# Patient Record
Sex: MALE | Race: OTHER | HISPANIC OR LATINO | URBAN - METROPOLITAN AREA
[De-identification: names, ages, dates, MRNs, and addresses within clinical notes are randomized per-mention and may not be internally consistent; named-entity substitution may affect disease eponyms.]

---

## 2019-11-18 ENCOUNTER — EMERGENCY (EMERGENCY)
Facility: HOSPITAL | Age: 49
LOS: 1 days | Discharge: HOME | End: 2019-11-18
Attending: STUDENT IN AN ORGANIZED HEALTH CARE EDUCATION/TRAINING PROGRAM
Payer: MEDICAID

## 2019-11-18 ENCOUNTER — TRANSCRIPTION ENCOUNTER (OUTPATIENT)
Age: 49
End: 2019-11-18

## 2019-11-18 VITALS
TEMPERATURE: 98 F | SYSTOLIC BLOOD PRESSURE: 178 MMHG | HEART RATE: 99 BPM | RESPIRATION RATE: 20 BRPM | WEIGHT: 150.8 LBS | OXYGEN SATURATION: 99 % | DIASTOLIC BLOOD PRESSURE: 127 MMHG | HEIGHT: 69 IN

## 2019-11-18 VITALS — HEART RATE: 68 BPM | SYSTOLIC BLOOD PRESSURE: 161 MMHG | DIASTOLIC BLOOD PRESSURE: 100 MMHG

## 2019-11-18 LAB
ALBUMIN SERPL ELPH-MCNC: 4 G/DL — SIGNIFICANT CHANGE UP (ref 3.5–5)
ALP SERPL-CCNC: 99 U/L — SIGNIFICANT CHANGE UP (ref 40–120)
ALT FLD-CCNC: 25 U/L DA — SIGNIFICANT CHANGE UP (ref 10–60)
ANION GAP SERPL CALC-SCNC: 5 MMOL/L — SIGNIFICANT CHANGE UP (ref 5–17)
APPEARANCE UR: CLEAR — SIGNIFICANT CHANGE UP
AST SERPL-CCNC: 17 U/L — SIGNIFICANT CHANGE UP (ref 10–40)
BASOPHILS # BLD AUTO: 0.07 K/UL — SIGNIFICANT CHANGE UP (ref 0–0.2)
BASOPHILS NFR BLD AUTO: 0.6 % — SIGNIFICANT CHANGE UP (ref 0–2)
BILIRUB SERPL-MCNC: 0.7 MG/DL — SIGNIFICANT CHANGE UP (ref 0.2–1.2)
BILIRUB UR-MCNC: NEGATIVE — SIGNIFICANT CHANGE UP
BUN SERPL-MCNC: 20 MG/DL — HIGH (ref 7–18)
CALCIUM SERPL-MCNC: 9.4 MG/DL — SIGNIFICANT CHANGE UP (ref 8.4–10.5)
CHLORIDE SERPL-SCNC: 104 MMOL/L — SIGNIFICANT CHANGE UP (ref 96–108)
CO2 SERPL-SCNC: 29 MMOL/L — SIGNIFICANT CHANGE UP (ref 22–31)
COLOR SPEC: YELLOW — SIGNIFICANT CHANGE UP
CREAT SERPL-MCNC: 1.43 MG/DL — HIGH (ref 0.5–1.3)
DIFF PNL FLD: NEGATIVE — SIGNIFICANT CHANGE UP
EOSINOPHIL # BLD AUTO: 0.63 K/UL — HIGH (ref 0–0.5)
EOSINOPHIL NFR BLD AUTO: 5.8 % — SIGNIFICANT CHANGE UP (ref 0–6)
GLUCOSE SERPL-MCNC: 89 MG/DL — SIGNIFICANT CHANGE UP (ref 70–99)
GLUCOSE UR QL: NEGATIVE — SIGNIFICANT CHANGE UP
HCT VFR BLD CALC: 43.5 % — SIGNIFICANT CHANGE UP (ref 39–50)
HGB BLD-MCNC: 14.8 G/DL — SIGNIFICANT CHANGE UP (ref 13–17)
IMM GRANULOCYTES NFR BLD AUTO: 0.6 % — SIGNIFICANT CHANGE UP (ref 0–1.5)
KETONES UR-MCNC: NEGATIVE — SIGNIFICANT CHANGE UP
LEUKOCYTE ESTERASE UR-ACNC: ABNORMAL
LYMPHOCYTES # BLD AUTO: 2.65 K/UL — SIGNIFICANT CHANGE UP (ref 1–3.3)
LYMPHOCYTES # BLD AUTO: 24.2 % — SIGNIFICANT CHANGE UP (ref 13–44)
MCHC RBC-ENTMCNC: 29.5 PG — SIGNIFICANT CHANGE UP (ref 27–34)
MCHC RBC-ENTMCNC: 34 GM/DL — SIGNIFICANT CHANGE UP (ref 32–36)
MCV RBC AUTO: 86.8 FL — SIGNIFICANT CHANGE UP (ref 80–100)
MONOCYTES # BLD AUTO: 0.86 K/UL — SIGNIFICANT CHANGE UP (ref 0–0.9)
MONOCYTES NFR BLD AUTO: 7.9 % — SIGNIFICANT CHANGE UP (ref 2–14)
NEUTROPHILS # BLD AUTO: 6.66 K/UL — SIGNIFICANT CHANGE UP (ref 1.8–7.4)
NEUTROPHILS NFR BLD AUTO: 60.9 % — SIGNIFICANT CHANGE UP (ref 43–77)
NITRITE UR-MCNC: NEGATIVE — SIGNIFICANT CHANGE UP
NRBC # BLD: 0 /100 WBCS — SIGNIFICANT CHANGE UP (ref 0–0)
PH UR: 6 — SIGNIFICANT CHANGE UP (ref 5–8)
PLATELET # BLD AUTO: 298 K/UL — SIGNIFICANT CHANGE UP (ref 150–400)
POTASSIUM SERPL-MCNC: 3.7 MMOL/L — SIGNIFICANT CHANGE UP (ref 3.5–5.3)
POTASSIUM SERPL-SCNC: 3.7 MMOL/L — SIGNIFICANT CHANGE UP (ref 3.5–5.3)
PROT SERPL-MCNC: 8.6 G/DL — HIGH (ref 6–8.3)
PROT UR-MCNC: NEGATIVE — SIGNIFICANT CHANGE UP
RBC # BLD: 5.01 M/UL — SIGNIFICANT CHANGE UP (ref 4.2–5.8)
RBC # FLD: 13.2 % — SIGNIFICANT CHANGE UP (ref 10.3–14.5)
SODIUM SERPL-SCNC: 138 MMOL/L — SIGNIFICANT CHANGE UP (ref 135–145)
SP GR SPEC: 1.01 — SIGNIFICANT CHANGE UP (ref 1.01–1.02)
UROBILINOGEN FLD QL: NEGATIVE — SIGNIFICANT CHANGE UP
WBC # BLD: 10.94 K/UL — HIGH (ref 3.8–10.5)
WBC # FLD AUTO: 10.94 K/UL — HIGH (ref 3.8–10.5)

## 2019-11-18 PROCEDURE — 96374 THER/PROPH/DIAG INJ IV PUSH: CPT | Mod: XU

## 2019-11-18 PROCEDURE — 99284 EMERGENCY DEPT VISIT MOD MDM: CPT | Mod: 25

## 2019-11-18 PROCEDURE — 87086 URINE CULTURE/COLONY COUNT: CPT

## 2019-11-18 PROCEDURE — 81001 URINALYSIS AUTO W/SCOPE: CPT

## 2019-11-18 PROCEDURE — 74177 CT ABD & PELVIS W/CONTRAST: CPT

## 2019-11-18 PROCEDURE — 80053 COMPREHEN METABOLIC PANEL: CPT

## 2019-11-18 PROCEDURE — 99284 EMERGENCY DEPT VISIT MOD MDM: CPT

## 2019-11-18 PROCEDURE — 74177 CT ABD & PELVIS W/CONTRAST: CPT | Mod: 26

## 2019-11-18 PROCEDURE — 85027 COMPLETE CBC AUTOMATED: CPT

## 2019-11-18 PROCEDURE — 36415 COLL VENOUS BLD VENIPUNCTURE: CPT

## 2019-11-18 RX ORDER — ACETAMINOPHEN 500 MG
1000 TABLET ORAL ONCE
Refills: 0 | Status: COMPLETED | OUTPATIENT
Start: 2019-11-18 | End: 2019-11-18

## 2019-11-18 RX ADMIN — Medication 400 MILLIGRAM(S): at 16:25

## 2019-11-18 NOTE — ED ADULT NURSE NOTE - ED STAT RN HANDOFF DETAILS
dutta cath inserted 1250 c/c of agustín color urine - MD made aware , dutta cath changed to the leg bag - pt given instruction to empty the leg bag -pt demonstrates understanding back - instructed to follow up with urology

## 2019-11-18 NOTE — ED PROVIDER NOTE - OBJECTIVE STATEMENT
48 y.o presenting with lower abd pain, dysuria x 2 days. denies n, v, fever, cough, cp, sob, diarrhea. endorses + hematuria.

## 2019-11-18 NOTE — ED ADULT NURSE NOTE - OBJECTIVE STATEMENT
pt is here c/o frequency and difficulty urinating for the last 2 days , abdomen is tender on palpation

## 2019-11-18 NOTE — ED PROVIDER NOTE - PATIENT PORTAL LINK FT
You can access the FollowMyHealth Patient Portal offered by  by registering at the following website: [email or web address]. By joining [employer]’s FollowMyHealth portal, you will also be able to view your health information using other applications (apps) compatible with our system.

## 2019-11-18 NOTE — ED PROVIDER NOTE - PROGRESS NOTE DETAILS
patient ct show retention, dutta placed by nursing, 1200cc return, patient ambulatory, likely 2/2 bph, given instruction to f.u urology. return precaution instructed

## 2019-11-19 ENCOUNTER — APPOINTMENT (OUTPATIENT)
Dept: UROLOGY | Facility: CLINIC | Age: 49
End: 2019-11-19
Payer: MEDICAID

## 2019-11-19 VITALS
OXYGEN SATURATION: 98 % | DIASTOLIC BLOOD PRESSURE: 93 MMHG | HEIGHT: 68 IN | HEART RATE: 102 BPM | BODY MASS INDEX: 23.49 KG/M2 | RESPIRATION RATE: 16 BRPM | WEIGHT: 155 LBS | SYSTOLIC BLOOD PRESSURE: 135 MMHG

## 2019-11-19 DIAGNOSIS — Z80.1 FAMILY HISTORY OF MALIGNANT NEOPLASM OF TRACHEA, BRONCHUS AND LUNG: ICD-10-CM

## 2019-11-19 DIAGNOSIS — Z78.9 OTHER SPECIFIED HEALTH STATUS: ICD-10-CM

## 2019-11-19 DIAGNOSIS — N39.41 URGE INCONTINENCE: ICD-10-CM

## 2019-11-19 PROBLEM — Z00.00 ENCOUNTER FOR PREVENTIVE HEALTH EXAMINATION: Status: ACTIVE | Noted: 2019-11-19

## 2019-11-19 LAB
CULTURE RESULTS: SIGNIFICANT CHANGE UP
SPECIMEN SOURCE: SIGNIFICANT CHANGE UP

## 2019-11-19 PROCEDURE — 99204 OFFICE O/P NEW MOD 45 MIN: CPT

## 2019-11-19 NOTE — ASSESSMENT
[FreeTextEntry1] : Very pleasant 48-year-old gentleman who presents for evaluation of BPH with urinary obstruction, urinary retention, bilateral hydronephrosis\par -Records from hospital reviewed\par -CT images from hospital reviewed with the patient\par -Start Flomax b.i.d.\par -Discussed the natural history of BPH, as well as typical symptoms of an enlarged prostate. Discussed potential complications that could arise from BPH, including but not limited to urinary tract infections, bladder stones, and renal impairment.\par -I discussed the risks, benefits, alternatives, and possible side effects of alpha blocker therapy with the patient, including but not limited to dizziness, lightheadedness, headache, blurred vision, retrograde ejaculation, and priapism with the patient. I also discussed that the patient must inform his ophthalmologist that he has taken an alpha blocker prior to undergoing cataract or glaucoma surgery.\par -Follow up in 3 days for trial of void

## 2019-11-19 NOTE — PHYSICAL EXAM
[General Appearance - Well Developed] : well developed [General Appearance - Well Nourished] : well nourished [Normal Appearance] : normal appearance [Well Groomed] : well groomed [General Appearance - In No Acute Distress] : no acute distress [Edema] : no peripheral edema [Respiration, Rhythm And Depth] : normal respiratory rhythm and effort [Exaggerated Use Of Accessory Muscles For Inspiration] : no accessory muscle use [Abdomen Soft] : soft [Abdomen Tenderness] : non-tender [Costovertebral Angle Tenderness] : no ~M costovertebral angle tenderness [Urethral Meatus] : meatus normal [Urinary Bladder Findings] : the bladder was normal on palpation [Scrotum] : the scrotum was normal [Testes Mass (___cm)] : there were no testicular masses [FreeTextEntry1] : Alexander with clear yellow urine [Normal Station and Gait] : the gait and station were normal for the patient's age [] : no rash [No Focal Deficits] : no focal deficits [Affect] : the affect was normal [Oriented To Time, Place, And Person] : oriented to person, place, and time [Mood] : the mood was normal [Not Anxious] : not anxious [No Palpable Adenopathy] : no palpable adenopathy

## 2019-11-19 NOTE — REVIEW OF SYSTEMS
[Palpitations] : palpitations [Abdominal Pain] : abdominal pain [Strain or push to urinate] : strain or push to urinate [Wait a long time to urinate] : waits a long time to urinate [Slow urine stream] : slow urine stream [Interrupted urine stream] : interrupted urine stream [Negative] : Heme/Lymph [FreeTextEntry3] : frequent uriation 10x in the daytime as per pt.

## 2019-11-19 NOTE — HISTORY OF PRESENT ILLNESS
[FreeTextEntry1] : Very pleasant 48-year-old gentleman who presents for evaluation of BPH with urinary obstruction, urinary retention, bilateral hydronephrosis. Patient went to the emergency department yesterday complaining of an inability to urinate. He reports that the symptoms started 2 days ago. In the emergency department he was noted to have a distended bladder and a CT scan demonstrated bilateral hydronephrosis with significant bladder distention. A Alexander catheter was placed and he was discharged home. He was not discharged with any medications. He reports a history of a weak urinary stream, more pronounced recently. He denies dysuria. No hematuria. No flank pain or suprapubic pain normally, but he did experience suprapubic pain with this episode of retention. No specific timing to his symptoms. No aggravating factors, but when Alexander catheter was placed his symptoms significantly improved. [Urinary Retention] : urinary retention [Urinary Urgency] : no urinary urgency [Nocturia] : nocturia [Urinary Frequency] : no urinary frequency [Weak Stream] : weak stream [Dysuria] : no dysuria [Hematuria - Gross] : no gross hematuria [Bladder Spasm] : no bladder spasm [Abdominal Pain] : no abdominal pain [Flank Pain] : no flank pain

## 2019-11-22 ENCOUNTER — APPOINTMENT (OUTPATIENT)
Dept: UROLOGY | Facility: CLINIC | Age: 49
End: 2019-11-22
Payer: MEDICAID

## 2019-11-22 VITALS
BODY MASS INDEX: 23.49 KG/M2 | SYSTOLIC BLOOD PRESSURE: 176 MMHG | HEIGHT: 68 IN | DIASTOLIC BLOOD PRESSURE: 120 MMHG | WEIGHT: 155 LBS | HEART RATE: 91 BPM

## 2019-11-22 PROCEDURE — 99213 OFFICE O/P EST LOW 20 MIN: CPT | Mod: 25

## 2019-11-22 PROCEDURE — 51703 INSERT BLADDER CATH COMPLEX: CPT

## 2019-11-22 NOTE — HISTORY OF PRESENT ILLNESS
[FreeTextEntry1] : Very pleasant 49-year-old gentleman presents for followup of BPH with urinary obstruction and urinary retention. Patient recently went to the emergency complaining of inability to urinate. A CT scan was done which demonstrated a very distended bladder. A Alexander catheter was placed. He was started on Flomax on Tuesday and presents today for a trial of void. He has been taking Flomax b.i.d. for the last 2 days. [Urinary Retention] : urinary retention [Urinary Urgency] : no urinary urgency [Urinary Frequency] : no urinary frequency [Nocturia] : nocturia [Weak Stream] : weak stream [Dysuria] : no dysuria [Hematuria - Gross] : no gross hematuria [Bladder Spasm] : no bladder spasm [Abdominal Pain] : no abdominal pain [Flank Pain] : no flank pain

## 2019-11-22 NOTE — PHYSICAL EXAM

## 2019-11-22 NOTE — ASSESSMENT
[FreeTextEntry1] : Very pleasant 49-year-old gentleman who presents for followup of BPH with urinary obstruction and urinary retention\par -Trial of void performed in the office today\par -Patient was able to urinate, however he reports a very slow urinary stream\par -\par -Alexander catheter replaced\par -Cystoscopy in one week and repeat trial of void at that time

## 2019-11-27 ENCOUNTER — APPOINTMENT (OUTPATIENT)
Dept: UROLOGY | Facility: CLINIC | Age: 49
End: 2019-11-27
Payer: MEDICAID

## 2019-11-27 VITALS
RESPIRATION RATE: 17 BRPM | BODY MASS INDEX: 23.49 KG/M2 | SYSTOLIC BLOOD PRESSURE: 180 MMHG | HEIGHT: 68 IN | HEART RATE: 101 BPM | WEIGHT: 155 LBS | DIASTOLIC BLOOD PRESSURE: 106 MMHG

## 2019-11-27 PROCEDURE — 52000 CYSTOURETHROSCOPY: CPT

## 2019-11-27 PROCEDURE — 99214 OFFICE O/P EST MOD 30 MIN: CPT | Mod: 25

## 2019-11-27 PROCEDURE — 51741 ELECTRO-UROFLOWMETRY FIRST: CPT

## 2019-11-27 RX ORDER — FINASTERIDE 5 MG/1
5 TABLET, FILM COATED ORAL DAILY
Qty: 30 | Refills: 3 | Status: ACTIVE | COMMUNITY
Start: 2019-11-27 | End: 1900-01-01

## 2019-11-27 RX ORDER — ALFUZOSIN HYDROCHLORIDE 10 MG/1
10 TABLET, EXTENDED RELEASE ORAL DAILY
Qty: 30 | Refills: 1 | Status: ACTIVE | COMMUNITY
Start: 2019-11-27 | End: 1900-01-01

## 2019-11-27 NOTE — ASSESSMENT
[FreeTextEntry1] : Presents for followup of BPH with urinary obstruction and urinary retention\par -Cystoscopy demonstrates trilobar hypertrophy, but no urothelial lesions\par -Uroflow demonstrates Q. max of 6.2 cc per second\par -We had an extensive discussion of potential options for management moving forward\par -Patient would like to try a different alpha blocker as well as a 5 alpha reductase inhibitor because of the concern for infertility after outlet procedures\par -Followup in one week for trial of void\par -Start alfuzosin and finasteride

## 2019-11-27 NOTE — HISTORY OF PRESENT ILLNESS
[FreeTextEntry1] : Very pleasant 49-year-old gentleman who presents for followup of BPH with urinary obstruction. Patient underwent cystoscopy today which demonstrated an enlarged prostate with trilobar hypertrophy. He was able to urinate afterwards, and reports a stronger stream of urine. On uroflow, he was noted to have a peak flow rate of 6.2 cc per second with a voided volume of 176 cc. He felt distended and uncomfortable. A PVR was found to be only 125 cc, however he was uncomfortable and requested a catheter. Patient is interested in having additional children in the future. [Urinary Retention] : urinary retention [Urinary Urgency] : no urinary urgency [Urinary Frequency] : no urinary frequency [Nocturia] : nocturia [Weak Stream] : weak stream [Dysuria] : no dysuria [Hematuria - Gross] : no gross hematuria [Bladder Spasm] : no bladder spasm [Abdominal Pain] : no abdominal pain [Flank Pain] : no flank pain

## 2019-11-27 NOTE — PHYSICAL EXAM
[General Appearance - Well Developed] : well developed [General Appearance - Well Nourished] : well nourished [Normal Appearance] : normal appearance [Well Groomed] : well groomed [General Appearance - In No Acute Distress] : no acute distress [Abdomen Soft] : soft [Abdomen Tenderness] : non-tender [Costovertebral Angle Tenderness] : no ~M costovertebral angle tenderness [Urethral Meatus] : meatus normal [Urinary Bladder Findings] : the bladder was normal on palpation [Scrotum] : the scrotum was normal [Testes Mass (___cm)] : there were no testicular masses [FreeTextEntry1] : Alexander with clear yellow urine [Edema] : no peripheral edema [] : no respiratory distress [Respiration, Rhythm And Depth] : normal respiratory rhythm and effort [Exaggerated Use Of Accessory Muscles For Inspiration] : no accessory muscle use [Oriented To Time, Place, And Person] : oriented to person, place, and time [Affect] : the affect was normal [Mood] : the mood was normal [Not Anxious] : not anxious [Normal Station and Gait] : the gait and station were normal for the patient's age [No Focal Deficits] : no focal deficits [No Palpable Adenopathy] : no palpable adenopathy

## 2019-12-03 ENCOUNTER — APPOINTMENT (OUTPATIENT)
Dept: UROLOGY | Facility: CLINIC | Age: 49
End: 2019-12-03
Payer: MEDICAID

## 2019-12-03 VITALS
DIASTOLIC BLOOD PRESSURE: 90 MMHG | HEART RATE: 107 BPM | WEIGHT: 155 LBS | BODY MASS INDEX: 23.49 KG/M2 | HEIGHT: 68 IN | SYSTOLIC BLOOD PRESSURE: 142 MMHG

## 2019-12-03 PROCEDURE — 99214 OFFICE O/P EST MOD 30 MIN: CPT

## 2019-12-03 NOTE — PHYSICAL EXAM
[General Appearance - Well Nourished] : well nourished [Normal Appearance] : normal appearance [General Appearance - Well Developed] : well developed [Well Groomed] : well groomed [General Appearance - In No Acute Distress] : no acute distress [Costovertebral Angle Tenderness] : no ~M costovertebral angle tenderness [Abdomen Soft] : soft [Abdomen Tenderness] : non-tender [Urinary Bladder Findings] : the bladder was normal on palpation [Urethral Meatus] : meatus normal [Scrotum] : the scrotum was normal [Testes Mass (___cm)] : there were no testicular masses [FreeTextEntry1] : Alexander with clear yellow urine [Edema] : no peripheral edema [Respiration, Rhythm And Depth] : normal respiratory rhythm and effort [Exaggerated Use Of Accessory Muscles For Inspiration] : no accessory muscle use [] : no respiratory distress [Affect] : the affect was normal [Mood] : the mood was normal [Oriented To Time, Place, And Person] : oriented to person, place, and time [Not Anxious] : not anxious [Normal Station and Gait] : the gait and station were normal for the patient's age [No Palpable Adenopathy] : no palpable adenopathy [No Focal Deficits] : no focal deficits

## 2019-12-03 NOTE — HISTORY OF PRESENT ILLNESS
[Urinary Retention] : urinary retention [FreeTextEntry1] : Very pleasant 49-year-old gentleman who presents for followup of BPH with urinary obstruction and urinary retention. Patient reports that he does not feel that he will be able to urinate today with catheter removal and trial of void. He is interested in an outlet procedure. He does report, however that he is still considering having additional children. He denies that area. No hematuria. No flank pain or suprapubic pain. No other complaints. [Urinary Urgency] : no urinary urgency [Urinary Frequency] : no urinary frequency [Nocturia] : nocturia [Weak Stream] : weak stream [Dysuria] : no dysuria [Hematuria - Gross] : no gross hematuria [Bladder Spasm] : no bladder spasm [Abdominal Pain] : no abdominal pain [Flank Pain] : no flank pain

## 2019-12-23 ENCOUNTER — APPOINTMENT (OUTPATIENT)
Dept: UROLOGY | Facility: CLINIC | Age: 49
End: 2019-12-23

## 2020-01-03 ENCOUNTER — APPOINTMENT (OUTPATIENT)
Dept: UROLOGY | Facility: CLINIC | Age: 50
End: 2020-01-03
Payer: MEDICAID

## 2020-01-03 VITALS
SYSTOLIC BLOOD PRESSURE: 159 MMHG | DIASTOLIC BLOOD PRESSURE: 110 MMHG | WEIGHT: 155 LBS | HEART RATE: 74 BPM | HEIGHT: 68 IN | BODY MASS INDEX: 23.49 KG/M2

## 2020-01-03 PROCEDURE — 99213 OFFICE O/P EST LOW 20 MIN: CPT

## 2020-01-03 NOTE — PHYSICAL EXAM
[General Appearance - Well Nourished] : well nourished [General Appearance - Well Developed] : well developed [General Appearance - In No Acute Distress] : no acute distress [Normal Appearance] : normal appearance [Well Groomed] : well groomed [Costovertebral Angle Tenderness] : no ~M costovertebral angle tenderness [Abdomen Tenderness] : non-tender [Abdomen Soft] : soft [Urinary Bladder Findings] : the bladder was normal on palpation [Edema] : no peripheral edema [] : no respiratory distress [Respiration, Rhythm And Depth] : normal respiratory rhythm and effort [Exaggerated Use Of Accessory Muscles For Inspiration] : no accessory muscle use [Affect] : the affect was normal [Oriented To Time, Place, And Person] : oriented to person, place, and time [Not Anxious] : not anxious [Mood] : the mood was normal [Normal Station and Gait] : the gait and station were normal for the patient's age [No Focal Deficits] : no focal deficits [No Palpable Adenopathy] : no palpable adenopathy

## 2020-01-03 NOTE — ASSESSMENT
[FreeTextEntry1] : Very pleasant 49-year-old gentleman who presents for followup of BPH with urinary obstruction and urinary retention\par -We discussed the risks and benefits of a trial of void in the afternoon on a Friday. We discussed that if he would be unable to urinate, he would need to go to the emergency department for a Alexander catheter\par -Patient would like to postpone trial of void until next week\par -We discussed the likelihood of spontaneous voiding, given inability to urinate on previous occasions\par -We discussed risks and benefits of an outlet procedure\par -Follow up Tuesday morning for repeat trial of void

## 2020-01-03 NOTE — HISTORY OF PRESENT ILLNESS
[FreeTextEntry1] : Very pleasant 49-year-old gentleman who presents for followup of BPH with urinary obstruction and urinary retention. Patient was previously found to be in urinary retention with bilateral hydronephrosis. A Alexander catheter was placed and was most recently changed on November 27, 2019. He was scheduled to undergo urodynamics in anticipation of an outlet procedure on December 23, however he canceled his appointment. He presents today to discuss further management options and requests that the catheter be removed. He denies dysuria. No hematuria. He reports mild irritation at the catheter insertion site. [Urinary Retention] : urinary retention [Urinary Urgency] : no urinary urgency [Urinary Frequency] : no urinary frequency [Nocturia] : nocturia [Weak Stream] : weak stream [Dysuria] : no dysuria [Hematuria - Gross] : no gross hematuria [Bladder Spasm] : no bladder spasm [Flank Pain] : no flank pain [Abdominal Pain] : no abdominal pain

## 2020-01-07 ENCOUNTER — APPOINTMENT (OUTPATIENT)
Dept: UROLOGY | Facility: CLINIC | Age: 50
End: 2020-01-07
Payer: MEDICAID

## 2020-01-07 VITALS
HEIGHT: 68 IN | BODY MASS INDEX: 23.49 KG/M2 | WEIGHT: 155 LBS | SYSTOLIC BLOOD PRESSURE: 198 MMHG | HEART RATE: 87 BPM | DIASTOLIC BLOOD PRESSURE: 120 MMHG

## 2020-01-07 PROCEDURE — 99213 OFFICE O/P EST LOW 20 MIN: CPT | Mod: 25

## 2020-01-07 PROCEDURE — 51700 IRRIGATION OF BLADDER: CPT

## 2020-01-07 NOTE — ASSESSMENT
[FreeTextEntry1] : A very pleasant 49-year-old gentleman who presents for followup of BPH with urinary obstruction and urinary retention\par -Patient urinated well after Alexander catheter was removed\par -Week and discussed the risks of urinary retention, and patient understands that he must call or go to the emergency department immediately he would be unable to urinate\par -Followup in 3-4 days for uroflow and PVR\par

## 2020-01-07 NOTE — PHYSICAL EXAM
[General Appearance - Well Developed] : well developed [General Appearance - Well Nourished] : well nourished [Well Groomed] : well groomed [Normal Appearance] : normal appearance [General Appearance - In No Acute Distress] : no acute distress [Abdomen Tenderness] : non-tender [Abdomen Soft] : soft [Costovertebral Angle Tenderness] : no ~M costovertebral angle tenderness [FreeTextEntry1] : Alexander with clear yellow [Urinary Bladder Findings] : the bladder was normal on palpation [Edema] : no peripheral edema [] : no respiratory distress [Exaggerated Use Of Accessory Muscles For Inspiration] : no accessory muscle use [Respiration, Rhythm And Depth] : normal respiratory rhythm and effort [Mood] : the mood was normal [Oriented To Time, Place, And Person] : oriented to person, place, and time [Affect] : the affect was normal [Normal Station and Gait] : the gait and station were normal for the patient's age [Not Anxious] : not anxious [No Focal Deficits] : no focal deficits [No Palpable Adenopathy] : no palpable adenopathy

## 2020-01-07 NOTE — HISTORY OF PRESENT ILLNESS
[FreeTextEntry1] : Very pleasant 49-year-old gentleman presents for followup of BPH with urinary obstruction and urinary retention. Patient feels well. He continues to take alfuzosin. He reports no problems with Alexander catheter. He is here today for a trial of void. [Urinary Retention] : urinary retention [Urinary Urgency] : no urinary urgency [Urinary Frequency] : no urinary frequency [Nocturia] : nocturia [Weak Stream] : weak stream [Dysuria] : no dysuria [Hematuria - Gross] : no gross hematuria [Bladder Spasm] : no bladder spasm [Abdominal Pain] : no abdominal pain [Flank Pain] : no flank pain

## 2020-01-10 ENCOUNTER — APPOINTMENT (OUTPATIENT)
Dept: UROLOGY | Facility: CLINIC | Age: 50
End: 2020-01-10
Payer: MEDICAID

## 2020-01-10 DIAGNOSIS — N13.30 UNSPECIFIED HYDRONEPHROSIS: ICD-10-CM

## 2020-01-10 DIAGNOSIS — N13.8 BENIGN PROSTATIC HYPERPLASIA WITH LOWER URINARY TRACT SYMPMS: ICD-10-CM

## 2020-01-10 DIAGNOSIS — N40.1 BENIGN PROSTATIC HYPERPLASIA WITH LOWER URINARY TRACT SYMPMS: ICD-10-CM

## 2020-01-10 DIAGNOSIS — R33.9 RETENTION OF URINE, UNSPECIFIED: ICD-10-CM

## 2020-01-10 PROCEDURE — 99213 OFFICE O/P EST LOW 20 MIN: CPT

## 2020-01-10 RX ORDER — TAMSULOSIN HYDROCHLORIDE 0.4 MG/1
0.4 CAPSULE ORAL DAILY
Qty: 120 | Refills: 0 | Status: DISCONTINUED | COMMUNITY
Start: 2019-11-19 | End: 2020-01-10

## 2020-01-10 NOTE — PHYSICAL EXAM
[General Appearance - Well Developed] : well developed [Normal Appearance] : normal appearance [General Appearance - Well Nourished] : well nourished [Well Groomed] : well groomed [General Appearance - In No Acute Distress] : no acute distress [Abdomen Soft] : soft [Costovertebral Angle Tenderness] : no ~M costovertebral angle tenderness [Abdomen Tenderness] : non-tender [Urinary Bladder Findings] : the bladder was normal on palpation [Edema] : no peripheral edema [] : no respiratory distress [Respiration, Rhythm And Depth] : normal respiratory rhythm and effort [Oriented To Time, Place, And Person] : oriented to person, place, and time [Exaggerated Use Of Accessory Muscles For Inspiration] : no accessory muscle use [Mood] : the mood was normal [Not Anxious] : not anxious [Affect] : the affect was normal [Normal Station and Gait] : the gait and station were normal for the patient's age [No Focal Deficits] : no focal deficits [No Palpable Adenopathy] : no palpable adenopathy

## 2020-01-10 NOTE — HISTORY OF PRESENT ILLNESS
[Urinary Retention] : no urinary retention [Urinary Urgency] : no urinary urgency [Urinary Frequency] : no urinary frequency [Weak Stream] : no weak stream [Dysuria] : no dysuria [Hematuria - Gross] : no gross hematuria [Bladder Spasm] : no bladder spasm [Abdominal Pain] : no abdominal pain [Flank Pain] : no flank pain

## 2020-01-10 NOTE — ASSESSMENT
[FreeTextEntry1] : Very pleasant 49-year-old gentleman who presents for followup of BPH with urinary obstruction\par -PVR 51\par -Continue alfuzosin\par -Continue finasteride\par -We discussed that he is still at significant risk of urinary retention and will likely need an outlet procedure in the future\par -Patient would like to wait for an outlet procedure until after he is finished having children next year\par -Risks of urinary retention and BPH were discussed at length again with the patient\par -Follow up in 3 months

## 2020-04-03 ENCOUNTER — APPOINTMENT (OUTPATIENT)
Dept: UROLOGY | Facility: CLINIC | Age: 50
End: 2020-04-03

## 2021-03-29 ENCOUNTER — OFFICE VISIT (OUTPATIENT)
Dept: FAMILY MEDICINE CLINIC | Facility: CLINIC | Age: 51
End: 2021-03-29

## 2021-03-29 VITALS
OXYGEN SATURATION: 97 % | SYSTOLIC BLOOD PRESSURE: 166 MMHG | HEART RATE: 83 BPM | DIASTOLIC BLOOD PRESSURE: 106 MMHG | TEMPERATURE: 98.3 F | WEIGHT: 153.4 LBS | BODY MASS INDEX: 23.25 KG/M2 | RESPIRATION RATE: 20 BRPM | HEIGHT: 68 IN

## 2021-03-29 DIAGNOSIS — N40.1 BENIGN PROSTATIC HYPERPLASIA WITH LOWER URINARY TRACT SYMPTOMS, SYMPTOM DETAILS UNSPECIFIED: Primary | ICD-10-CM

## 2021-03-29 DIAGNOSIS — M54.41 CHRONIC RIGHT-SIDED LOW BACK PAIN WITH RIGHT-SIDED SCIATICA: ICD-10-CM

## 2021-03-29 DIAGNOSIS — G89.29 CHRONIC RIGHT-SIDED LOW BACK PAIN WITH RIGHT-SIDED SCIATICA: ICD-10-CM

## 2021-03-29 DIAGNOSIS — Z12.11 COLON CANCER SCREENING: ICD-10-CM

## 2021-03-29 DIAGNOSIS — I10 UNCONTROLLED HYPERTENSION: ICD-10-CM

## 2021-03-29 DIAGNOSIS — Z11.4 SCREENING FOR HIV (HUMAN IMMUNODEFICIENCY VIRUS): ICD-10-CM

## 2021-03-29 DIAGNOSIS — Z12.5 PROSTATE CANCER SCREENING: ICD-10-CM

## 2021-03-29 LAB
SL AMB  POCT GLUCOSE, UA: NORMAL
SL AMB LEUKOCYTE ESTERASE,UA: NORMAL
SL AMB POCT BILIRUBIN,UA: NEGATIVE
SL AMB POCT BLOOD,UA: NORMAL
SL AMB POCT CLARITY,UA: NORMAL
SL AMB POCT COLOR,UA: YELLOW
SL AMB POCT KETONES,UA: NEGATIVE
SL AMB POCT NITRITE,UA: NEGATIVE
SL AMB POCT PH,UA: 6
SL AMB POCT SPECIFIC GRAVITY,UA: 1.02
SL AMB POCT URINE PROTEIN: NORMAL
SL AMB POCT UROBILINOGEN: NORMAL

## 2021-03-29 PROCEDURE — 99204 OFFICE O/P NEW MOD 45 MIN: CPT | Performed by: NURSE PRACTITIONER

## 2021-03-29 PROCEDURE — 81002 URINALYSIS NONAUTO W/O SCOPE: CPT | Performed by: NURSE PRACTITIONER

## 2021-03-29 RX ORDER — TAMSULOSIN HYDROCHLORIDE 0.4 MG/1
0.4 CAPSULE ORAL
Qty: 30 CAPSULE | Refills: 1 | Status: SHIPPED | OUTPATIENT
Start: 2021-03-29 | End: 2021-06-07 | Stop reason: SDUPTHER

## 2021-03-29 RX ORDER — LISINOPRIL 20 MG/1
20 TABLET ORAL DAILY
Qty: 30 TABLET | Refills: 0 | Status: SHIPPED | OUTPATIENT
Start: 2021-03-29 | End: 2021-04-05

## 2021-03-29 RX ORDER — AMOXICILLIN 500 MG/1
500 CAPSULE ORAL EVERY 6 HOURS
COMMUNITY
Start: 2021-02-12 | End: 2021-08-03

## 2021-03-29 RX ORDER — METHOCARBAMOL 500 MG/1
500 TABLET, FILM COATED ORAL 3 TIMES DAILY
Qty: 40 TABLET | Refills: 0 | Status: SHIPPED | OUTPATIENT
Start: 2021-03-29 | End: 2021-05-10

## 2021-03-29 RX ORDER — IBUPROFEN 800 MG/1
800 TABLET ORAL EVERY 6 HOURS
COMMUNITY
Start: 2021-02-12 | End: 2021-03-29

## 2021-03-29 NOTE — ASSESSMENT & PLAN NOTE
Secondary to work injury 3 years ago  Recently exacerbated over the past few months  Will check right lumbar x-ray and start topical diclofenac to affected area and methocarbamol needed 3 times daily  Did discuss with patient the possibility grogginess with the methocarbamol and advised against driving or using machinery after taking this medication

## 2021-03-29 NOTE — ASSESSMENT & PLAN NOTE
Patient with urinary frequency and incomplete bladder emptying   Was following with Urology in [place] and on a medication  Urine dip in the office negative for evidence of infection  Will restart the patient on tamsulosin 0 4 mg HS and refer to Urology

## 2021-03-29 NOTE — PROGRESS NOTES
Assessment/Plan:    Uncontrolled hypertension  Blood pressure is not at goal today in the office at 166/106  Patient does report that he was told in [place] that he had hypertension and was on medication but cannot recall which  At this time will start the patient on lisinopril 20 mg daily  He will return to the clinic in several days for blood pressure recheck    Chronic right-sided low back pain with right-sided sciatica  Secondary to work injury 3 years ago  Recently exacerbated over the past few months  Will check right lumbar x-ray and start topical diclofenac to affected area and methocarbamol needed 3 times daily  Did discuss with patient the possibility grogginess with the methocarbamol and advised against driving or using machinery after taking this medication      Benign prostatic hyperplasia with lower urinary tract symptoms  Patient with urinary frequency and incomplete bladder emptying   Was following with Urology in [place] and on a medication  Urine dip in the office negative for evidence of infection  Will restart the patient on tamsulosin 0 4 mg HS and refer to Urology      [facility] was seen today for establish care, back pain, hypertension and urinary incontinence  Diagnoses and all orders for this visit:    Benign prostatic hyperplasia with lower urinary tract symptoms, symptom details unspecified  -     POCT urine dip  -     tamsulosin (FLOMAX) 0 4 mg; Take 1 capsule (0 4 mg total) by mouth daily with dinner  -     Ambulatory referral to Urology; Future    Prostate cancer screening  -     PSA, total and free; Future    Uncontrolled hypertension  -     CBC and differential; Future  -     Comprehensive metabolic panel; Future  -     Hemoglobin A1C; Future  -     Lipid panel; Future  -     TSH, 3rd generation with Free T4 reflex; Future  -     lisinopril (ZESTRIL) 20 mg tablet;  Take 1 tablet (20 mg total) by mouth daily    Screening for HIV (human immunodeficiency virus)  -     HIV 1/2 Antigen/Antibody (4th Generation) w Reflex [facility]; Future    Colon cancer screening  -     Ambulatory referral to Gastroenterology; Future    Chronic right-sided low back pain with right-sided sciatica  -     XR spine lumbar minimum 4 views non injury; Future  -     Diclofenac Sodium (VOLTAREN) 1 %; Apply 2 g topically 4 (four) times a day  -     methocarbamol (ROBAXIN) 500 mg tablet; Take 1 tablet (500 mg total) by mouth 3 (three) times a day      Return for return on Friday for BP check with nurse and 6 weeks with me FU htn, back pain   Patient Instructions     Heart Healthy Diet   WHAT YOU NEED TO KNOW:   A heart healthy diet is an eating plan low in unhealthy fats and sodium (salt)  The plan is high in healthy fats and fiber  A heart healthy diet helps improve your cholesterol levels and lowers your risk for heart disease and stroke  A dietitian will teach you how to read and understand food labels  DISCHARGE INSTRUCTIONS:   Heart healthy diet guidelines to follow:   · Choose foods that contain healthy fats  ? Unsaturated fats  include monounsaturated and polyunsaturated fats  Unsaturated fat is found in foods such as soybean, canola, olive, corn, and safflower oils  It is also found in soft tub margarine that is made with liquid vegetable oil  ? Omega-3 fat  is found in certain fish, such as salmon, tuna, and trout, and in walnuts and flaxseed  Eat fish high in omega-3 fats at least 2 times a week  · Get 20 to 30 grams of fiber each day  Fruits, vegetables, whole-grain foods, and legumes (cooked beans) are good sources of fiber  · Limit or do not have unhealthy fats  ? Cholesterol  is found in animal foods, such as eggs and lobster, and in dairy products made from whole milk  Limit cholesterol to less than 200 mg each day  ? Saturated fat  is found in meats, such as ramires and hamburger  It is also found in chicken or turkey skin, whole milk, and butter   Limit saturated fat to less than 7% of your total daily calories  ? Trans fat  is found in packaged foods, such as potato chips and cookies  It is also in hard margarine, some fried foods, and shortening  Do not eat foods that contain trans fats  · Limit sodium as directed  You may be told to limit sodium to 2,000 to 2,300 mg each day  Choose low-sodium or no-salt-added foods  Add little or no salt to food you prepare  Use herbs and spices in place of salt  Include the following in your heart healthy plan:  Ask your dietitian or healthcare provider how many servings to have from each of the following food groups:  · Grains:      ? Whole-wheat breads, cereals, and pastas, and brown rice    ? Low-fat, low-sodium crackers and chips    · Vegetables:      ? Broccoli, green beans, green peas, and spinach    ? Collards, kale, and lima beans    ? Carrots, sweet potatoes, tomatoes, and peppers    ? Canned vegetables with no salt added    · Fruits:      ? Bananas, peaches, pears, and pineapple    ? Grapes, raisins, and dates    ? Oranges, tangerines, grapefruit, orange juice, and grapefruit juice    ? Apricots, mangoes, melons, and papaya    ? Raspberries and strawberries    ? Canned fruit with no added sugar    · Low-fat dairy:      ? Nonfat (skim) milk, 1% milk, and low-fat almond, cashew, or soy milks fortified with calcium    ? Low-fat cheese, regular or frozen yogurt, and cottage cheese    · Meats and proteins:      ? Lean cuts of beef and pork (loin, leg, round), skinless chicken and turkey    ? Legumes, soy products, egg whites, or nuts    Limit or do not include the following in your heart healthy plan:   · Unhealthy fats and oils:      ? Whole or 2% milk, cream cheese, sour cream, or cheese    ? High-fat cuts of beef (T-bone steaks, ribs), chicken or turkey with skin, and organ meats such as liver    ?  Butter, stick margarine, shortening, and cooking oils such as coconut or palm oil    · Foods and liquids high in sodium: ? Packaged foods, such as frozen dinners, cookies, macaroni and cheese, and cereals with more than 300 mg of sodium per serving    ? Vegetables with added sodium, such as instant potatoes, vegetables with added sauces, or regular canned vegetables    ? Cured or smoked meats, such as hot dogs, ramires, and sausage    ? High-sodium ketchup, barbecue sauce, salad dressing, pickles, olives, soy sauce, or miso    · Foods and liquids high in sugar:      ? Candy, cake, cookies, pies, or doughnuts    ? Soft drinks (soda), sports drinks, or sweetened tea    ? Canned or dry mixes for cakes, soups, sauces, or gravies    Other healthy heart guidelines:   · Do not smoke  Nicotine and other chemicals in cigarettes and cigars can cause lung and heart damage  Ask your healthcare provider for information if you currently smoke and need help to quit  E-cigarettes or smokeless tobacco still contain nicotine  Talk to your healthcare provider before you use these products  · Limit or do not drink alcohol as directed  Alcohol can damage your heart and raise your blood pressure  Your healthcare provider may give you specific daily and weekly limits  The general recommended limit is 1 drink a day for women 21 or older and for men 72 or older  Do not have more than 3 drinks in a day or 7 in a week  The recommended limit is 2 drinks a day for men 24to 59years of age  Do not have more than 4 drinks in a day or 14 in a week  A drink of alcohol is 12 ounces of beer, 5 ounces of wine, or 1½ ounces of liquor  · Exercise regularly  Exercise can help you maintain a healthy weight and improve your blood pressure and cholesterol levels  Regular exercise can also decrease your risk for heart problems  Ask your healthcare provider about the best exercise plan for you  Do not start an exercise program without asking your healthcare provider         Follow up with your doctor or cardiologist as directed:  Write down your questions so you remember to ask them during your visits  © Copyright [address] Information is for End User's use only and may not be sold, redistributed or otherwise used for commercial purposes  All illustrations and images included in CareNotes® are the copyrighted property of [employer] D A M , Inc  or Angela Bartlett  The above information is an  only  It is not intended as medical advice for individual conditions or treatments  Talk to your doctor, nurse or pharmacist before following any medical regimen to see if it is safe and effective for you  Subjective:     Kraig Ganser is a 45 y o  male who  has a past medical history of Enlarged prostate and Hypertension  who presented to the office today to establish care  He his recently moved to the area from [place]  He reports that in the work he was following with a urologist for BPH  he does admit that he is having increased urinary frequency and incomplete bladder emptying  Is also noted to have elevated blood pressure in the office today  He denies any chest pain, shortness of breath, palpitations, headache, dizziness, weakness  Low Back Pain  Patient complains of chronic low back pain  This is evaluated as a personal injury  The patient first noted symptoms 3 years ago  It was related to a twisting movement  The pain is rated moderate, and is located at the right lumbar area  The pain is described as aching and burning and occurs intermittently  The symptoms denies been progressive  Symptoms are exacerbated by sitting, standing for more than several minutes and walking for more than several minutes  Factors which relieve the pain include heat, ice and rest  Other associated symptoms include tingling in the right leg  Previous history of symptoms: the problem is long-standing  Treatment efforts have included rest, ice and OTC NSAIDS, with and without relief      The following portions of the patient's history were reviewed and updated as appropriate: allergies, current medications, past family history, past medical history, past social history, past surgical history and problem list     Current Outpatient Medications on File Prior to Visit   Medication Sig Dispense Refill    amoxicillin (AMOXIL) 500 mg capsule Take 500 mg by mouth every 6 (six) hours      [DISCONTINUED] ibuprofen (MOTRIN) 800 mg tablet Take 800 mg by mouth every 6 (six) hours       No current facility-administered medications on file prior to visit  Review of Systems   Constitutional: Negative for chills and fever  HENT: Negative for ear pain and sore throat  Eyes: Negative for pain and visual disturbance  Respiratory: Negative for cough and shortness of breath  Cardiovascular: Negative for chest pain and palpitations  Gastrointestinal: Negative for abdominal pain and vomiting  Genitourinary: Negative for dysuria and hematuria  Musculoskeletal: Positive for arthralgias and back pain  Skin: Negative for color change and rash  Neurological: Negative for seizures and syncope  All other systems reviewed and are negative  Objective:    BP (!) 166/106 (BP Location: Left arm, Patient Position: Sitting, Cuff Size: Standard)   Pulse 83   Temp 98 3 °F (36 8 °C) (Temporal)   Resp 20   Ht 5' 8" (1 727 m)   Wt 69 6 kg (153 lb 6 4 oz)   SpO2 97%   BMI 23 32 kg/m²     Physical Exam  Vitals signs and nursing note reviewed  Constitutional:       General: He is not in acute distress  Appearance: He is well-developed  He is not diaphoretic  HENT:      Head: Normocephalic and atraumatic  Right Ear: External ear normal       Left Ear: External ear normal    Eyes:      General:         Right eye: No discharge  Left eye: No discharge  Conjunctiva/sclera: Conjunctivae normal       Pupils: Pupils are equal, round, and reactive to light  Neck:      Musculoskeletal: Normal range of motion and neck supple     Cardiovascular: Rate and Rhythm: Normal rate and regular rhythm  Pulses: Normal pulses  Heart sounds: Normal heart sounds  Pulmonary:      Effort: Pulmonary effort is normal  No respiratory distress  Breath sounds: Normal breath sounds  No wheezing  Abdominal:      General: Bowel sounds are normal  There is no distension  Palpations: Abdomen is soft  Tenderness: There is no abdominal tenderness  Musculoskeletal: Normal range of motion  General: No deformity  Lumbar back: He exhibits tenderness  Back:       Right lower leg: No edema  Left lower leg: No edema  Lymphadenopathy:      Cervical: No cervical adenopathy  Skin:     General: Skin is warm and dry  Capillary Refill: Capillary refill takes less than 2 seconds  Findings: No rash  Neurological:      General: No focal deficit present  Mental Status: He is alert and oriented to person, place, and time  Sensory: No sensory deficit        Coordination: Coordination normal       Deep Tendon Reflexes: Reflexes normal    Psychiatric:         Mood and Affect: Mood normal          Behavior: Behavior normal          TONY Ervin  03/29/21  5:06 PM

## 2021-03-29 NOTE — PATIENT INSTRUCTIONS
Heart Healthy Diet   WHAT YOU NEED TO KNOW:   A heart healthy diet is an eating plan low in unhealthy fats and sodium (salt)  The plan is high in healthy fats and fiber  A heart healthy diet helps improve your cholesterol levels and lowers your risk for heart disease and stroke  A dietitian will teach you how to read and understand food labels  DISCHARGE INSTRUCTIONS:   Heart healthy diet guidelines to follow:   · Choose foods that contain healthy fats  ? Unsaturated fats  include monounsaturated and polyunsaturated fats  Unsaturated fat is found in foods such as soybean, canola, olive, corn, and safflower oils  It is also found in soft tub margarine that is made with liquid vegetable oil  ? Omega-3 fat  is found in certain fish, such as salmon, tuna, and trout, and in walnuts and flaxseed  Eat fish high in omega-3 fats at least 2 times a week  · Get 20 to 30 grams of fiber each day  Fruits, vegetables, whole-grain foods, and legumes (cooked beans) are good sources of fiber  · Limit or do not have unhealthy fats  ? Cholesterol  is found in animal foods, such as eggs and lobster, and in dairy products made from whole milk  Limit cholesterol to less than 200 mg each day  ? Saturated fat  is found in meats, such as ramires and hamburger  It is also found in chicken or turkey skin, whole milk, and butter  Limit saturated fat to less than 7% of your total daily calories  ? Trans fat  is found in packaged foods, such as potato chips and cookies  It is also in hard margarine, some fried foods, and shortening  Do not eat foods that contain trans fats  · Limit sodium as directed  You may be told to limit sodium to 2,000 to 2,300 mg each day  Choose low-sodium or no-salt-added foods  Add little or no salt to food you prepare  Use herbs and spices in place of salt         Include the following in your heart healthy plan:  Ask your dietitian or healthcare provider how many servings to have from each of the following food groups:  · Grains:      ? Whole-wheat breads, cereals, and pastas, and brown rice    ? Low-fat, low-sodium crackers and chips    · Vegetables:      ? Broccoli, green beans, green peas, and spinach    ? Collards, kale, and lima beans    ? Carrots, sweet potatoes, tomatoes, and peppers    ? Canned vegetables with no salt added    · Fruits:      ? Bananas, peaches, pears, and pineapple    ? Grapes, raisins, and dates    ? Oranges, tangerines, grapefruit, orange juice, and grapefruit juice    ? Apricots, mangoes, melons, and papaya    ? Raspberries and strawberries    ? Canned fruit with no added sugar    · Low-fat dairy:      ? Nonfat (skim) milk, 1% milk, and low-fat almond, cashew, or soy milks fortified with calcium    ? Low-fat cheese, regular or frozen yogurt, and cottage cheese    · Meats and proteins:      ? Lean cuts of beef and pork (loin, leg, round), skinless chicken and turkey    ? Legumes, soy products, egg whites, or nuts    Limit or do not include the following in your heart healthy plan:   · Unhealthy fats and oils:      ? Whole or 2% milk, cream cheese, sour cream, or cheese    ? High-fat cuts of beef (T-bone steaks, ribs), chicken or turkey with skin, and organ meats such as liver    ? Butter, stick margarine, shortening, and cooking oils such as coconut or palm oil    · Foods and liquids high in sodium:      ? Packaged foods, such as frozen dinners, cookies, macaroni and cheese, and cereals with more than 300 mg of sodium per serving    ? Vegetables with added sodium, such as instant potatoes, vegetables with added sauces, or regular canned vegetables    ? Cured or smoked meats, such as hot dogs, ramires, and sausage    ? High-sodium ketchup, barbecue sauce, salad dressing, pickles, olives, soy sauce, or miso    · Foods and liquids high in sugar:      ? Candy, cake, cookies, pies, or doughnuts    ? Soft drinks (soda), sports drinks, or sweetened tea    ?  Canned or dry mixes for cakes, soups, sauces, or gravies    Other healthy heart guidelines:   · Do not smoke  Nicotine and other chemicals in cigarettes and cigars can cause lung and heart damage  Ask your healthcare provider for information if you currently smoke and need help to quit  E-cigarettes or smokeless tobacco still contain nicotine  Talk to your healthcare provider before you use these products  · Limit or do not drink alcohol as directed  Alcohol can damage your heart and raise your blood pressure  Your healthcare provider may give you specific daily and weekly limits  The general recommended limit is 1 drink a day for women 21 or older and for men 72 or older  Do not have more than 3 drinks in a day or 7 in a week  The recommended limit is 2 drinks a day for men 24to 59years of age  Do not have more than 4 drinks in a day or 14 in a week  A drink of alcohol is 12 ounces of beer, 5 ounces of wine, or 1½ ounces of liquor  · Exercise regularly  Exercise can help you maintain a healthy weight and improve your blood pressure and cholesterol levels  Regular exercise can also decrease your risk for heart problems  Ask your healthcare provider about the best exercise plan for you  Do not start an exercise program without asking your healthcare provider  Follow up with your doctor or cardiologist as directed:  Write down your questions so you remember to ask them during your visits  © Copyright [address] Information is for End User's use only and may not be sold, redistributed or otherwise used for commercial purposes  All illustrations and images included in CareNotes® are the copyrighted property of A D A M , Inc  or [address] [place] [address]  The above information is an  only  It is not intended as medical advice for individual conditions or treatments  Talk to your doctor, nurse or pharmacist before following any medical regimen to see if it is safe and effective for you

## 2021-03-29 NOTE — ASSESSMENT & PLAN NOTE
Blood pressure is not at goal today in the office at 166/106  Patient does report that he was told in [place] that he had hypertension and was on medication but cannot recall which  At this time will start the patient on lisinopril 20 mg daily  He will return to the clinic in several days for blood pressure recheck

## 2021-04-01 ENCOUNTER — TELEPHONE (OUTPATIENT)
Dept: UROLOGY | Facility: AMBULATORY SURGERY CENTER | Age: 51
End: 2021-04-01

## 2021-04-01 NOTE — TELEPHONE ENCOUNTER
Attempted to contact Patient to schedule OV per referral from PCP N40 1 (ICD-10-CM) - Benign prostatic hyperplasia with lower urinary tract symptoms, symptom details unspecified  Unable to LM due to mailbox not set up

## 2021-04-02 ENCOUNTER — CLINICAL SUPPORT (OUTPATIENT)
Dept: FAMILY MEDICINE CLINIC | Facility: CLINIC | Age: 51
End: 2021-04-02

## 2021-04-02 ENCOUNTER — HOSPITAL ENCOUNTER (OUTPATIENT)
Dept: RADIOLOGY | Facility: HOSPITAL | Age: 51
Discharge: HOME | End: 2021-04-02
Payer: COMMERCIAL

## 2021-04-02 VITALS — HEART RATE: 86 BPM | SYSTOLIC BLOOD PRESSURE: 170 MMHG | DIASTOLIC BLOOD PRESSURE: 112 MMHG

## 2021-04-02 DIAGNOSIS — G89.29 CHRONIC RIGHT-SIDED LOW BACK PAIN WITH RIGHT-SIDED SCIATICA: ICD-10-CM

## 2021-04-02 DIAGNOSIS — I10 UNCONTROLLED HYPERTENSION: Primary | ICD-10-CM

## 2021-04-02 DIAGNOSIS — M54.41 CHRONIC RIGHT-SIDED LOW BACK PAIN WITH RIGHT-SIDED SCIATICA: ICD-10-CM

## 2021-04-02 PROCEDURE — 72110 X-RAY EXAM L-2 SPINE 4/>VWS: CPT

## 2021-04-05 ENCOUNTER — APPOINTMENT (OUTPATIENT)
Dept: LAB | Facility: CLINIC | Age: 51
End: 2021-04-05
Payer: COMMERCIAL

## 2021-04-05 ENCOUNTER — CLINICAL SUPPORT (OUTPATIENT)
Dept: FAMILY MEDICINE CLINIC | Facility: CLINIC | Age: 51
End: 2021-04-05

## 2021-04-05 VITALS — DIASTOLIC BLOOD PRESSURE: 102 MMHG | SYSTOLIC BLOOD PRESSURE: 154 MMHG

## 2021-04-05 DIAGNOSIS — Z12.5 PROSTATE CANCER SCREENING: ICD-10-CM

## 2021-04-05 DIAGNOSIS — I10 UNCONTROLLED HYPERTENSION: Primary | ICD-10-CM

## 2021-04-05 DIAGNOSIS — I10 UNCONTROLLED HYPERTENSION: ICD-10-CM

## 2021-04-05 DIAGNOSIS — Z11.4 SCREENING FOR HIV (HUMAN IMMUNODEFICIENCY VIRUS): ICD-10-CM

## 2021-04-05 LAB
ALBUMIN SERPL BCP-MCNC: 4.1 G/DL (ref 3.5–5)
ALP SERPL-CCNC: 98 U/L (ref 46–116)
ALT SERPL W P-5'-P-CCNC: 30 U/L (ref 12–78)
ANION GAP SERPL CALCULATED.3IONS-SCNC: 4 MMOL/L (ref 4–13)
AST SERPL W P-5'-P-CCNC: 11 U/L (ref 5–45)
BASOPHILS # BLD AUTO: 0.06 THOUSANDS/ΜL (ref 0–0.1)
BASOPHILS NFR BLD AUTO: 1 % (ref 0–1)
BILIRUB SERPL-MCNC: 0.38 MG/DL (ref 0.2–1)
BUN SERPL-MCNC: 18 MG/DL (ref 5–25)
CALCIUM SERPL-MCNC: 9.7 MG/DL (ref 8.3–10.1)
CHLORIDE SERPL-SCNC: 108 MMOL/L (ref 100–108)
CHOLEST SERPL-MCNC: 238 MG/DL (ref 50–200)
CO2 SERPL-SCNC: 27 MMOL/L (ref 21–32)
CREAT SERPL-MCNC: 1.19 MG/DL (ref 0.6–1.3)
EOSINOPHIL # BLD AUTO: 0.36 THOUSAND/ΜL (ref 0–0.61)
EOSINOPHIL NFR BLD AUTO: 5 % (ref 0–6)
ERYTHROCYTE [DISTWIDTH] IN BLOOD BY AUTOMATED COUNT: 13.4 % (ref 11.6–15.1)
EST. AVERAGE GLUCOSE BLD GHB EST-MCNC: 100 MG/DL
GFR SERPL CREATININE-BSD FRML MDRD: 70 ML/MIN/1.73SQ M
GLUCOSE P FAST SERPL-MCNC: 105 MG/DL (ref 65–99)
HBA1C MFR BLD: 5.1 %
HCT VFR BLD AUTO: 45.9 % (ref 36.5–49.3)
HDLC SERPL-MCNC: 45 MG/DL
HGB BLD-MCNC: 15.2 G/DL (ref 12–17)
IMM GRANULOCYTES # BLD AUTO: 0.02 THOUSAND/UL (ref 0–0.2)
IMM GRANULOCYTES NFR BLD AUTO: 0 % (ref 0–2)
LDLC SERPL CALC-MCNC: 147 MG/DL (ref 0–100)
LYMPHOCYTES # BLD AUTO: 2.82 THOUSANDS/ΜL (ref 0.6–4.47)
LYMPHOCYTES NFR BLD AUTO: 41 % (ref 14–44)
MCH RBC QN AUTO: 29.2 PG (ref 26.8–34.3)
MCHC RBC AUTO-ENTMCNC: 33.1 G/DL (ref 31.4–37.4)
MCV RBC AUTO: 88 FL (ref 82–98)
MONOCYTES # BLD AUTO: 0.38 THOUSAND/ΜL (ref 0.17–1.22)
MONOCYTES NFR BLD AUTO: 6 % (ref 4–12)
NEUTROPHILS # BLD AUTO: 3.27 THOUSANDS/ΜL (ref 1.85–7.62)
NEUTS SEG NFR BLD AUTO: 47 % (ref 43–75)
NONHDLC SERPL-MCNC: 193 MG/DL
NRBC BLD AUTO-RTO: 0 /100 WBCS
PLATELET # BLD AUTO: 306 THOUSANDS/UL (ref 149–390)
PMV BLD AUTO: 10.5 FL (ref 8.9–12.7)
POTASSIUM SERPL-SCNC: 3.9 MMOL/L (ref 3.5–5.3)
PROT SERPL-MCNC: 8.9 G/DL (ref 6.4–8.2)
RBC # BLD AUTO: 5.2 MILLION/UL (ref 3.88–5.62)
SODIUM SERPL-SCNC: 139 MMOL/L (ref 136–145)
TRIGL SERPL-MCNC: 231 MG/DL
TSH SERPL DL<=0.05 MIU/L-ACNC: 0.91 UIU/ML (ref 0.36–3.74)
WBC # BLD AUTO: 6.91 THOUSAND/UL (ref 4.31–10.16)

## 2021-04-05 PROCEDURE — 84443 ASSAY THYROID STIM HORMONE: CPT

## 2021-04-05 PROCEDURE — 87389 HIV-1 AG W/HIV-1&-2 AB AG IA: CPT

## 2021-04-05 PROCEDURE — 84153 ASSAY OF PSA TOTAL: CPT

## 2021-04-05 PROCEDURE — 36415 COLL VENOUS BLD VENIPUNCTURE: CPT

## 2021-04-05 PROCEDURE — 84154 ASSAY OF PSA FREE: CPT

## 2021-04-05 PROCEDURE — 80053 COMPREHEN METABOLIC PANEL: CPT

## 2021-04-05 PROCEDURE — 80061 LIPID PANEL: CPT

## 2021-04-05 PROCEDURE — 85025 COMPLETE CBC W/AUTO DIFF WBC: CPT

## 2021-04-05 PROCEDURE — 83036 HEMOGLOBIN GLYCOSYLATED A1C: CPT

## 2021-04-05 RX ORDER — LISINOPRIL 40 MG/1
40 TABLET ORAL DAILY
Qty: 90 TABLET | Refills: 0 | Status: SHIPPED | OUTPATIENT
Start: 2021-04-05 | End: 2021-04-20 | Stop reason: SDUPTHER

## 2021-04-05 NOTE — PROGRESS NOTES
Pt was here today for a BP check BP was 154/102  Pt reports taking his medication this morning around 9:00 AM, he didn't report any chest pain, blurred vision, or dizziness  Spoke to his pcp Whitney Beltre, she will be increasing his lisinopril to 40mg  Pt was instructed when he get to his house to take another pill "lisinopril 20mg" starting tomorrow to take 2 tabs of lisinopril 20mg, until he gets the new prescription of lisinopril 40mg  Pt understood

## 2021-04-05 NOTE — PATIENT INSTRUCTIONS
Pt was instructed when he get to his house to take another pill "lisinopril 20mg" starting tomorrow to take 2 tabs of lisinopril 20mg, until he gets the new prescription of lisinopril 40mg

## 2021-04-05 NOTE — Clinical Note
Pt was here today for a BP check BP was 154/102  Pt reports taking his medication this morning around 9:00 AM, he didn't report any chest pain, blurred vision, or dizziness  Spoke to his pcp Ciara Johnson, she will be increasing his lisinopril to 40mg  Pt was instructed when he get to his house to take another pill "lisinopril 20mg" starting tomorrow to take 2 tabs of lisinopril 20mg, until he gets the new prescription of lisinopril 40mg  Pt understood

## 2021-04-06 LAB
HIV 1+2 AB+HIV1 P24 AG SERPL QL IA: NORMAL
PSA FREE MFR SERPL: 11.5 %
PSA FREE SERPL-MCNC: 0.46 NG/ML
PSA SERPL-MCNC: 4 NG/ML (ref 0–4)

## 2021-04-07 DIAGNOSIS — E78.2 MIXED HYPERLIPIDEMIA: Primary | ICD-10-CM

## 2021-04-07 RX ORDER — ATORVASTATIN CALCIUM 40 MG/1
40 TABLET, FILM COATED ORAL DAILY
Qty: 90 TABLET | Refills: 3 | Status: SHIPPED | OUTPATIENT
Start: 2021-04-07 | End: 2021-06-07 | Stop reason: SDUPTHER

## 2021-04-20 DIAGNOSIS — I10 UNCONTROLLED HYPERTENSION: ICD-10-CM

## 2021-04-20 RX ORDER — LISINOPRIL 20 MG/1
TABLET ORAL
Qty: 30 TABLET | Refills: 0 | OUTPATIENT
Start: 2021-04-20

## 2021-04-20 RX ORDER — LISINOPRIL 40 MG/1
40 TABLET ORAL DAILY
Qty: 90 TABLET | Refills: 0 | Status: SHIPPED | OUTPATIENT
Start: 2021-04-20 | End: 2021-06-07 | Stop reason: SDUPTHER

## 2021-05-10 ENCOUNTER — OFFICE VISIT (OUTPATIENT)
Dept: FAMILY MEDICINE CLINIC | Facility: CLINIC | Age: 51
End: 2021-05-10

## 2021-05-10 VITALS
HEART RATE: 83 BPM | DIASTOLIC BLOOD PRESSURE: 120 MMHG | RESPIRATION RATE: 18 BRPM | SYSTOLIC BLOOD PRESSURE: 172 MMHG | HEIGHT: 68 IN | OXYGEN SATURATION: 98 % | WEIGHT: 153.1 LBS | BODY MASS INDEX: 23.2 KG/M2 | TEMPERATURE: 98 F

## 2021-05-10 DIAGNOSIS — G89.29 CHRONIC RIGHT-SIDED LOW BACK PAIN WITH RIGHT-SIDED SCIATICA: ICD-10-CM

## 2021-05-10 DIAGNOSIS — I10 UNCONTROLLED HYPERTENSION: ICD-10-CM

## 2021-05-10 DIAGNOSIS — N40.1 BENIGN PROSTATIC HYPERPLASIA WITH LOWER URINARY TRACT SYMPTOMS, SYMPTOM DETAILS UNSPECIFIED: Primary | ICD-10-CM

## 2021-05-10 DIAGNOSIS — M54.41 CHRONIC RIGHT-SIDED LOW BACK PAIN WITH RIGHT-SIDED SCIATICA: ICD-10-CM

## 2021-05-10 PROCEDURE — 99214 OFFICE O/P EST MOD 30 MIN: CPT | Performed by: NURSE PRACTITIONER

## 2021-05-10 PROCEDURE — 93000 ELECTROCARDIOGRAM COMPLETE: CPT | Performed by: NURSE PRACTITIONER

## 2021-05-10 RX ORDER — AMLODIPINE BESYLATE 10 MG/1
10 TABLET ORAL DAILY
Qty: 90 TABLET | Refills: 0 | Status: SHIPPED | OUTPATIENT
Start: 2021-05-10 | End: 2021-06-07 | Stop reason: SDUPTHER

## 2021-05-10 RX ORDER — METHOCARBAMOL 750 MG/1
750 TABLET, FILM COATED ORAL 3 TIMES DAILY PRN
Qty: 40 TABLET | Refills: 0 | Status: SHIPPED | OUTPATIENT
Start: 2021-05-10 | End: 2021-06-07 | Stop reason: SDUPTHER

## 2021-05-10 NOTE — PROGRESS NOTES
Assessment/Plan:    Uncontrolled hypertension  Blood pressure continues to be uncontrolled   BP is 172/120 and recheck 168/ 118   Currently on lisinopril 40 mg daily, add amlodipine 10 mg daily   RTC in 3 days for BP check , ED parameters reviewed   EKG in office NSR with nonspecific t-wave abnormality, 70 BPM   Recommend referral to cardiology     Chronic right-sided low back pain with right-sided sciatica  Secondary to work injury 3 years ago  Recently exacerbated over the past few months  lumbar x-ray done 4/2/2021 with no abnormalities   Continue topical diclofenac to affected area and increase methocarbamol to 750 mg as needed 3 times daily  Did discuss with patient the possibility grogginess with the methocarbamol and advised against driving or using machinery after taking this medication  Also referral to [facility] was seen today for hypertension and back pain  Diagnoses and all orders for this visit:    Benign prostatic hyperplasia with lower urinary tract symptoms, symptom details unspecified    Uncontrolled hypertension  -     amLODIPine (NORVASC) 10 mg tablet; Take 1 tablet (10 mg total) by mouth daily  -     POCT ECG  -     Ambulatory referral to Cardiology; Future    Chronic right-sided low back pain with right-sided sciatica  -     Ambulatory referral to Physical Therapy; Future  -     methocarbamol (ROBAXIN) 750 mg tablet; Take 1 tablet (750 mg total) by mouth 3 (three) times a day as needed for muscle spasms        Return for RTC on Friday for BP check and in 4 weeks with me   Patient Instructions     Heart Healthy Diet   WHAT YOU NEED TO KNOW:   A heart healthy diet is an eating plan low in unhealthy fats and sodium (salt)  The plan is high in healthy fats and fiber  A heart healthy diet helps improve your cholesterol levels and lowers your risk for heart disease and stroke  A dietitian will teach you how to read and understand food labels    DISCHARGE INSTRUCTIONS:   Heart healthy diet guidelines to follow:   · Choose foods that contain healthy fats  ? Unsaturated fats  include monounsaturated and polyunsaturated fats  Unsaturated fat is found in foods such as soybean, canola, olive, corn, and safflower oils  It is also found in soft tub margarine that is made with liquid vegetable oil  ? Omega-3 fat  is found in certain fish, such as salmon, tuna, and trout, and in walnuts and flaxseed  Eat fish high in omega-3 fats at least 2 times a week  · Get 20 to 30 grams of fiber each day  Fruits, vegetables, whole-grain foods, and legumes (cooked beans) are good sources of fiber  · Limit or do not have unhealthy fats  ? Cholesterol  is found in animal foods, such as eggs and lobster, and in dairy products made from whole milk  Limit cholesterol to less than 200 mg each day  ? Saturated fat  is found in meats, such as ramires and hamburger  It is also found in chicken or turkey skin, whole milk, and butter  Limit saturated fat to less than 7% of your total daily calories  ? Trans fat  is found in packaged foods, such as potato chips and cookies  It is also in hard margarine, some fried foods, and shortening  Do not eat foods that contain trans fats  · Limit sodium as directed  You may be told to limit sodium to 2,000 to 2,300 mg each day  Choose low-sodium or no-salt-added foods  Add little or no salt to food you prepare  Use herbs and spices in place of salt  Include the following in your heart healthy plan:  Ask your dietitian or healthcare provider how many servings to have from each of the following food groups:  · Grains:      ? Whole-wheat breads, cereals, and pastas, and brown rice    ? Low-fat, low-sodium crackers and chips    · Vegetables:      ? Broccoli, green beans, green peas, and spinach    ? Collards, kale, and lima beans    ? Carrots, sweet potatoes, tomatoes, and peppers    ?  Canned vegetables with no salt added    · Fruits:      ? Bananas, peaches, pears, and pineapple    ? Grapes, raisins, and dates    ? Oranges, tangerines, grapefruit, orange juice, and grapefruit juice    ? Apricots, mangoes, melons, and papaya    ? Raspberries and strawberries    ? Canned fruit with no added sugar    · Low-fat dairy:      ? Nonfat (skim) milk, 1% milk, and low-fat almond, cashew, or soy milks fortified with calcium    ? Low-fat cheese, regular or frozen yogurt, and cottage cheese    · Meats and proteins:      ? Lean cuts of beef and pork (loin, leg, round), skinless chicken and turkey    ? Legumes, soy products, egg whites, or nuts    Limit or do not include the following in your heart healthy plan:   · Unhealthy fats and oils:      ? Whole or 2% milk, cream cheese, sour cream, or cheese    ? High-fat cuts of beef (T-bone steaks, ribs), chicken or turkey with skin, and organ meats such as liver    ? Butter, stick margarine, shortening, and cooking oils such as coconut or palm oil    · Foods and liquids high in sodium:      ? Packaged foods, such as frozen dinners, cookies, macaroni and cheese, and cereals with more than 300 mg of sodium per serving    ? Vegetables with added sodium, such as instant potatoes, vegetables with added sauces, or regular canned vegetables    ? Cured or smoked meats, such as hot dogs, ramires, and sausage    ? High-sodium ketchup, barbecue sauce, salad dressing, pickles, olives, soy sauce, or miso    · Foods and liquids high in sugar:      ? Candy, cake, cookies, pies, or doughnuts    ? Soft drinks (soda), sports drinks, or sweetened tea    ? Canned or dry mixes for cakes, soups, sauces, or gravies    Other healthy heart guidelines:   · Do not smoke  Nicotine and other chemicals in cigarettes and cigars can cause lung and heart damage  Ask your healthcare provider for information if you currently smoke and need help to quit  E-cigarettes or smokeless tobacco still contain nicotine   Talk to your healthcare provider before you use these products  · Limit or do not drink alcohol as directed  Alcohol can damage your heart and raise your blood pressure  Your healthcare provider may give you specific daily and weekly limits  The general recommended limit is 1 drink a day for women 21 or older and for men 72 or older  Do not have more than 3 drinks in a day or 7 in a week  The recommended limit is 2 drinks a day for men 24to 59years of age  Do not have more than 4 drinks in a day or 14 in a week  A drink of alcohol is 12 ounces of beer, 5 ounces of wine, or 1½ ounces of liquor  · Exercise regularly  Exercise can help you maintain a healthy weight and improve your blood pressure and cholesterol levels  Regular exercise can also decrease your risk for heart problems  Ask your healthcare provider about the best exercise plan for you  Do not start an exercise program without asking your healthcare provider  Follow up with your doctor or cardiologist as directed:  Write down your questions so you remember to ask them during your visits  © Copyright [address] Information is for End User's use only and may not be sold, redistributed or otherwise used for commercial purposes  All illustrations and images included in CareNotes® are the copyrighted property of [employer] D A M , Inc  or [address] [place] [address] [employer] [facility]  The above information is an  only  It is not intended as medical advice for individual conditions or treatments  Talk to your doctor, nurse or pharmacist before following any medical regimen to see if it is safe and effective for you  Subjective:     Ann Haley is a 45 y o  male who  has a past medical history of Enlarged prostate and Hypertension  who presented to the office today for follow up  He states that back pain is not improved as compared to last visit  He had a normal lumbar x-ray several weeks ago  He has not yet tried PT   He is taking the BP medication daily and reports that he took this morning at 8am  He denies any chest pain, shortness of breath, palpitations, headache, dizziness, weakness  The following portions of the patient's history were reviewed and updated as appropriate: allergies, current medications, past family history, past medical history, past social history, past surgical history and problem list     Current Outpatient Medications on File Prior to Visit   Medication Sig Dispense Refill    atorvastatin (LIPITOR) 40 mg tablet Take 1 tablet (40 mg total) by mouth daily 90 tablet 3    Diclofenac Sodium (VOLTAREN) 1 % Apply 2 g topically 4 (four) times a day 100 g 2    lisinopril (ZESTRIL) 40 mg tablet Take 1 tablet (40 mg total) by mouth daily 90 tablet 0    tamsulosin (FLOMAX) 0 4 mg Take 1 capsule (0 4 mg total) by mouth daily with dinner 30 capsule 1    [DISCONTINUED] methocarbamol (ROBAXIN) 500 mg tablet Take 1 tablet (500 mg total) by mouth 3 (three) times a day 40 tablet 0    amoxicillin (AMOXIL) 500 mg capsule Take 500 mg by mouth every 6 (six) hours       No current facility-administered medications on file prior to visit  Review of Systems   Constitutional: Negative for activity change, appetite change, chills, fatigue, fever and unexpected weight change  HENT: Negative for hearing loss, nosebleeds, sinus pain, sneezing, sore throat and trouble swallowing  Eyes: Negative for photophobia and visual disturbance  Respiratory: Negative for cough, chest tightness, shortness of breath and wheezing  Cardiovascular: Negative for chest pain, palpitations and leg swelling  Gastrointestinal: Negative for abdominal pain, constipation, nausea and vomiting  Genitourinary: Negative for decreased urine volume, difficulty urinating, dysuria, flank pain, genital sores, hematuria and urgency  Musculoskeletal: Positive for arthralgias and back pain  Negative for gait problem  Skin: Negative for pallor, rash and wound     Neurological: Negative for dizziness, seizures, syncope, weakness, numbness and headaches  Hematological: Negative for adenopathy  Does not bruise/bleed easily  Psychiatric/Behavioral: Negative for confusion, hallucinations, self-injury, sleep disturbance and suicidal ideas  The patient is not nervous/anxious  Objective:    BP (!) 172/120 (BP Location: Left arm, Patient Position: Sitting, Cuff Size: Standard) Comment: pt states he took his medication around 8 am today  Pulse 83   Temp 98 °F (36 7 °C) (Temporal)   Resp 18   Ht 5' 8" (1 727 m)   Wt 69 4 kg (153 lb 1 6 oz)   SpO2 98%   BMI 23 28 kg/m²     Physical Exam  Vitals signs and nursing note reviewed  Constitutional:       General: He is not in acute distress  Appearance: He is well-developed  He is not diaphoretic  HENT:      Head: Normocephalic and atraumatic  Right Ear: External ear normal       Left Ear: External ear normal    Eyes:      General:         Right eye: No discharge  Left eye: No discharge  Conjunctiva/sclera: Conjunctivae normal       Pupils: Pupils are equal, round, and reactive to light  Neck:      Musculoskeletal: Normal range of motion and neck supple  Cardiovascular:      Rate and Rhythm: Normal rate and regular rhythm  Pulses: Normal pulses  Heart sounds: Normal heart sounds  Pulmonary:      Effort: Pulmonary effort is normal  No respiratory distress  Breath sounds: Normal breath sounds  No wheezing  Abdominal:      General: Bowel sounds are normal  There is no distension  Palpations: Abdomen is soft  Tenderness: There is no abdominal tenderness  Musculoskeletal: Normal range of motion  General: No deformity  Lumbar back: He exhibits tenderness  Back:       Right lower leg: No edema  Left lower leg: No edema  Lymphadenopathy:      Cervical: No cervical adenopathy  Skin:     General: Skin is warm and dry  Capillary Refill: Capillary refill takes less than 2 seconds  Findings: No rash  Neurological:      General: No focal deficit present  Mental Status: He is alert and oriented to person, place, and time  Sensory: No sensory deficit        Coordination: Coordination normal       Deep Tendon Reflexes: Reflexes normal    Psychiatric:         Mood and Affect: Mood normal          Behavior: Behavior normal          TONY Baird  05/10/21  2:24 PM

## 2021-05-10 NOTE — PATIENT INSTRUCTIONS
Heart Healthy Diet   WHAT YOU NEED TO KNOW:   A heart healthy diet is an eating plan low in unhealthy fats and sodium (salt)  The plan is high in healthy fats and fiber  A heart healthy diet helps improve your cholesterol levels and lowers your risk for heart disease and stroke  A dietitian will teach you how to read and understand food labels  DISCHARGE INSTRUCTIONS:   Heart healthy diet guidelines to follow:   · Choose foods that contain healthy fats  ? Unsaturated fats  include monounsaturated and polyunsaturated fats  Unsaturated fat is found in foods such as soybean, canola, olive, corn, and safflower oils  It is also found in soft tub margarine that is made with liquid vegetable oil  ? Omega-3 fat  is found in certain fish, such as salmon, tuna, and trout, and in walnuts and flaxseed  Eat fish high in omega-3 fats at least 2 times a week  · Get 20 to 30 grams of fiber each day  Fruits, vegetables, whole-grain foods, and legumes (cooked beans) are good sources of fiber  · Limit or do not have unhealthy fats  ? Cholesterol  is found in animal foods, such as eggs and lobster, and in dairy products made from whole milk  Limit cholesterol to less than 200 mg each day  ? Saturated fat  is found in meats, such as ramires and hamburger  It is also found in chicken or turkey skin, whole milk, and butter  Limit saturated fat to less than 7% of your total daily calories  ? Trans fat  is found in packaged foods, such as potato chips and cookies  It is also in hard margarine, some fried foods, and shortening  Do not eat foods that contain trans fats  · Limit sodium as directed  You may be told to limit sodium to 2,000 to 2,300 mg each day  Choose low-sodium or no-salt-added foods  Add little or no salt to food you prepare  Use herbs and spices in place of salt         Include the following in your heart healthy plan:  Ask your dietitian or healthcare provider how many servings to have from each of the following food groups:  · Grains:      ? Whole-wheat breads, cereals, and pastas, and brown rice    ? Low-fat, low-sodium crackers and chips    · Vegetables:      ? Broccoli, green beans, green peas, and spinach    ? Collards, kale, and lima beans    ? Carrots, sweet potatoes, tomatoes, and peppers    ? Canned vegetables with no salt added    · Fruits:      ? Bananas, peaches, pears, and pineapple    ? Grapes, raisins, and dates    ? Oranges, tangerines, grapefruit, orange juice, and grapefruit juice    ? Apricots, mangoes, melons, and papaya    ? Raspberries and strawberries    ? Canned fruit with no added sugar    · Low-fat dairy:      ? Nonfat (skim) milk, 1% milk, and low-fat almond, cashew, or soy milks fortified with calcium    ? Low-fat cheese, regular or frozen yogurt, and cottage cheese    · Meats and proteins:      ? Lean cuts of beef and pork (loin, leg, round), skinless chicken and turkey    ? Legumes, soy products, egg whites, or nuts    Limit or do not include the following in your heart healthy plan:   · Unhealthy fats and oils:      ? Whole or 2% milk, cream cheese, sour cream, or cheese    ? High-fat cuts of beef (T-bone steaks, ribs), chicken or turkey with skin, and organ meats such as liver    ? Butter, stick margarine, shortening, and cooking oils such as coconut or palm oil    · Foods and liquids high in sodium:      ? Packaged foods, such as frozen dinners, cookies, macaroni and cheese, and cereals with more than 300 mg of sodium per serving    ? Vegetables with added sodium, such as instant potatoes, vegetables with added sauces, or regular canned vegetables    ? Cured or smoked meats, such as hot dogs, ramires, and sausage    ? High-sodium ketchup, barbecue sauce, salad dressing, pickles, olives, soy sauce, or miso    · Foods and liquids high in sugar:      ? Candy, cake, cookies, pies, or doughnuts    ? Soft drinks (soda), sports drinks, or sweetened tea    ?  Canned or dry mixes for cakes, soups, sauces, or gravies    Other healthy heart guidelines:   · Do not smoke  Nicotine and other chemicals in cigarettes and cigars can cause lung and heart damage  Ask your healthcare provider for information if you currently smoke and need help to quit  E-cigarettes or smokeless tobacco still contain nicotine  Talk to your healthcare provider before you use these products  · Limit or do not drink alcohol as directed  Alcohol can damage your heart and raise your blood pressure  Your healthcare provider may give you specific daily and weekly limits  The general recommended limit is 1 drink a day for women 21 or older and for men 72 or older  Do not have more than 3 drinks in a day or 7 in a week  The recommended limit is 2 drinks a day for men 24to 59years of age  Do not have more than 4 drinks in a day or 14 in a week  A drink of alcohol is 12 ounces of beer, 5 ounces of wine, or 1½ ounces of liquor  · Exercise regularly  Exercise can help you maintain a healthy weight and improve your blood pressure and cholesterol levels  Regular exercise can also decrease your risk for heart problems  Ask your healthcare provider about the best exercise plan for you  Do not start an exercise program without asking your healthcare provider  Follow up with your doctor or cardiologist as directed:  Write down your questions so you remember to ask them during your visits  © Copyright [address] Information is for End User's use only and may not be sold, redistributed or otherwise used for commercial purposes  All illustrations and images included in CareNotes® are the copyrighted property of [employer]  or [address] [place] [address]  The above information is an  only  It is not intended as medical advice for individual conditions or treatments  Talk to your doctor, nurse or pharmacist before following any medical regimen to see if it is safe and effective for you

## 2021-05-10 NOTE — ASSESSMENT & PLAN NOTE
Secondary to work injury 3 years ago  Recently exacerbated over the past few months  lumbar x-ray done 4/2/2021 with no abnormalities   Continue topical diclofenac to affected area and increase methocarbamol to 750 mg as needed 3 times daily  Did discuss with patient the possibility grogginess with the methocarbamol and advised against driving or using machinery after taking this medication  Also referral to PT

## 2021-05-10 NOTE — ASSESSMENT & PLAN NOTE
Blood pressure continues to be uncontrolled   BP is 172/120 and recheck 168/ 118   Currently on lisinopril 40 mg daily, add amlodipine 10 mg daily   RTC in 3 days for BP check , ED parameters reviewed   EKG in office NSR with nonspecific t-wave abnormality, 70 BPM   Recommend referral to cardiology

## 2021-06-07 ENCOUNTER — HOSPITAL ENCOUNTER (OUTPATIENT)
Dept: RADIOLOGY | Facility: HOSPITAL | Age: 51
Discharge: HOME | End: 2021-06-07
Payer: COMMERCIAL

## 2021-06-07 ENCOUNTER — OFFICE VISIT (OUTPATIENT)
Dept: FAMILY MEDICINE CLINIC | Facility: CLINIC | Age: 51
End: 2021-06-07

## 2021-06-07 VITALS
OXYGEN SATURATION: 97 % | BODY MASS INDEX: 23.31 KG/M2 | HEIGHT: 68 IN | RESPIRATION RATE: 20 BRPM | DIASTOLIC BLOOD PRESSURE: 90 MMHG | HEART RATE: 83 BPM | WEIGHT: 153.8 LBS | TEMPERATURE: 98 F | SYSTOLIC BLOOD PRESSURE: 142 MMHG

## 2021-06-07 DIAGNOSIS — M25.511 CHRONIC RIGHT SHOULDER PAIN: ICD-10-CM

## 2021-06-07 DIAGNOSIS — G89.29 CHRONIC RIGHT SHOULDER PAIN: ICD-10-CM

## 2021-06-07 DIAGNOSIS — G89.29 CHRONIC RIGHT-SIDED THORACIC BACK PAIN: ICD-10-CM

## 2021-06-07 DIAGNOSIS — R01.1 MURMUR: ICD-10-CM

## 2021-06-07 DIAGNOSIS — N40.1 BENIGN PROSTATIC HYPERPLASIA WITH LOWER URINARY TRACT SYMPTOMS, SYMPTOM DETAILS UNSPECIFIED: ICD-10-CM

## 2021-06-07 DIAGNOSIS — M54.6 CHRONIC RIGHT-SIDED THORACIC BACK PAIN: ICD-10-CM

## 2021-06-07 DIAGNOSIS — G89.29 CHRONIC RIGHT-SIDED LOW BACK PAIN WITH RIGHT-SIDED SCIATICA: ICD-10-CM

## 2021-06-07 DIAGNOSIS — E78.2 MIXED HYPERLIPIDEMIA: ICD-10-CM

## 2021-06-07 DIAGNOSIS — I10 UNCONTROLLED HYPERTENSION: Primary | ICD-10-CM

## 2021-06-07 DIAGNOSIS — M54.41 CHRONIC RIGHT-SIDED LOW BACK PAIN WITH RIGHT-SIDED SCIATICA: ICD-10-CM

## 2021-06-07 PROCEDURE — 73030 X-RAY EXAM OF SHOULDER: CPT

## 2021-06-07 PROCEDURE — 72072 X-RAY EXAM THORAC SPINE 3VWS: CPT

## 2021-06-07 PROCEDURE — 99214 OFFICE O/P EST MOD 30 MIN: CPT | Performed by: NURSE PRACTITIONER

## 2021-06-07 RX ORDER — ATORVASTATIN CALCIUM 40 MG/1
40 TABLET, FILM COATED ORAL DAILY
Qty: 90 TABLET | Refills: 3 | Status: SHIPPED | OUTPATIENT
Start: 2021-06-07 | End: 2022-07-28

## 2021-06-07 RX ORDER — HYDROCHLOROTHIAZIDE 12.5 MG/1
12.5 TABLET ORAL DAILY
Qty: 30 TABLET | Refills: 0 | Status: CANCELLED | OUTPATIENT
Start: 2021-06-07

## 2021-06-07 RX ORDER — LISINOPRIL 40 MG/1
40 TABLET ORAL DAILY
Qty: 90 TABLET | Refills: 0 | Status: SHIPPED | OUTPATIENT
Start: 2021-06-07 | End: 2021-12-10

## 2021-06-07 RX ORDER — TAMSULOSIN HYDROCHLORIDE 0.4 MG/1
0.4 CAPSULE ORAL
Qty: 30 CAPSULE | Refills: 1 | Status: SHIPPED | OUTPATIENT
Start: 2021-06-07

## 2021-06-07 RX ORDER — AMLODIPINE BESYLATE 10 MG/1
10 TABLET ORAL DAILY
Qty: 90 TABLET | Refills: 0 | Status: SHIPPED | OUTPATIENT
Start: 2021-06-07

## 2021-06-07 RX ORDER — METHOCARBAMOL 750 MG/1
750 TABLET, FILM COATED ORAL 3 TIMES DAILY PRN
Qty: 40 TABLET | Refills: 0 | Status: SHIPPED | OUTPATIENT
Start: 2021-06-07

## 2021-06-07 NOTE — ASSESSMENT & PLAN NOTE
Slightly improved with current regimen - continue methocarbamol 750 mg tid prn and diclofenac topical  He starts PT on 6/15/2021

## 2021-06-07 NOTE — ASSESSMENT & PLAN NOTE
BP is significantly improved in the office today at 142/90  At this time continue current regimen: lisinopril 40 mg daily and amlodipine 10 mg daily

## 2021-06-07 NOTE — ASSESSMENT & PLAN NOTE
Was previously referred to urology, they were unable to contact him to schedule appt   Patient provided with urology office contact information so that he can call and schedule appt   Continue with tamsulosin 0 4 mg HS at this time

## 2021-06-07 NOTE — PROGRESS NOTES
PT Evaluation     Today's date: 6/15/2021  Patient name: Joel Yeager  : 1970  MRN: [id number]  Referring provider: TONY Beatty  Dx:   Encounter Diagnosis     ICD-10-CM    1  Chronic right-sided low back pain with right-sided sciatica  M54 41     G89 29                   Assessment  Assessment details: The patient is a 46 y/o male who presents to PT with diagnosis of chronic R sided LBP with sciatica  He has complaints of intermittent pain along the R side of his back  He denies any radiating leg pain or N&T  His BLE strength is WNL though there is pain with resistance testing for hip flexion and abduction  He demonstrates deficits with slight decreased L/S and RLE ROM and strength, decreased flexibility, decreased postural awareness, difficulty sleeping and pain with completing his ADLs and tasks at home  He remains I with all his ADLs though he has pain with completing them  He notes increased pain when he has to bend and twist or with lifting  He ambulates without AD, no significant gait deviations noted and he can go up and down the steps with reciprocal gait pattern  Difficulty sleeping is noted and pain can wake him up overnight or he has a hard time finding a comfortable position to sleep  He also has complaints of stiffness in the morning when getting out of bed  He did not demonstrate a directional preference with either L/S flexion or extension, no change in symptoms with repeated movements  No TTP is noted along his lumbar PVMs  Secondary to pain and above deficits he is limited with his overall mobility and function  The patient would benefit from continued PT to address deficits and improve function  Tx to include ROM, stretching, strengthening, modalities, HEP, pt education, postural ed, lifting/body mechanics, neuro re-ed, balance/proprioception Te, MT and equipment        Impairments: abnormal or restricted ROM, activity intolerance, impaired physical strength, lacks appropriate home exercise program, pain with function, poor posture  and poor body mechanics  Other impairment: decreased flexibility  Functional limitations: difficulty sleeping Understanding of Dx/Px/POC: good   Prognosis: good    Goals  STGs:  1  Initiate and complete HEP with verbal cues  2   Improve RLE ROM by 5-10 degrees in 4 weeks  3   Decrease LBP by > 25% in 4 weeks  LTGs:  1  Patient to be I with HEP in 12 weeks  2   Improve L/S ROM to WNL t/o in 12 weeks to improve function  3   Decrease LBP to < or = to 2-3/10 with activity in 12 weeks to improve function  4   Improve BLE ROM to WNL t/o in 12 weeks to improve function  5   Postural control is improved to maximal level of function in 12 weeks  6   Recreational performance is improved to maximal level of function in 12 weeks  7   ADL performance is improved to maximal level of function in 12 weeks  8   Patient to report improved sleep in 12 weeks  Plan  Plan details: Modalities and therapy interventions prn  Patient would benefit from: skilled physical therapy  Planned modality interventions: cryotherapy, thermotherapy: hydrocollator packs and low level laser therapy  Other planned modality interventions: IAS  Planned therapy interventions: abdominal trunk stabilization, manual therapy, behavior modification, body mechanics training, neuromuscular re-education, patient education, postural training, strengthening, self care, stretching, therapeutic activities, therapeutic exercise, flexibility and home exercise program  Frequency: 2x week  Duration in weeks: 12  Plan of Care beginning date: 6/15/2021  Plan of Care expiration date: 9/15/2021  Treatment plan discussed with: patient        Subjective Evaluation    History of Present Illness  Mechanism of injury: The patient states that he has had LBP for about 2 years now but over the past year has gotten progressively worse    He had seen his PCP and did get medicine for his back which has been helping  He also had x-rays taken, unsure of the results  He was referred to OPPT and he now presents for his evaluation  He will be going back to see his doctor in August for his next appointment  Quality of life: good    Pain  At best pain ratin  At worst pain ratin  Location: R side LBP  Intermittent R leg - has resolved since taking the medicine    Quality: dull ache, tight and discomfort  Relieving factors: heat and medications  Aggravating factors: lifting    Social Support  Steps to enter house: no  Stairs in house: yes   Lives in: multiple-level home  Lives with: spouse and young children    Employment status: not working    Diagnostic Tests  X-ray: normal (Per chart review, x-rays unremarkable)  Patient Goals  Patient goals for therapy: increased motion, decreased pain and increased strength  Patient goal: "To help get rid of the pain in my back "         Objective     Concurrent Complaints  Negative for disturbed sleep, bladder dysfunction, bowel dysfunction and saddle (S4) numbness    Static Posture     Lumbar Spine   Decreased lordosis  Postural Observations  Seated posture: fair  Standing posture: fair  Correction of posture: has no consistent effect        Palpation   Left   No palpable tenderness to the erector spinae, lumbar interspinals, lumbar paraspinals and quadratus lumborum  Right   No palpable tenderness to the erector spinae, lumbar interspinals, lumbar paraspinals and quadratus lumborum  Tenderness     Left Hip   No tenderness in the PSIS  Right Hip   No tenderness in the PSIS  Active Range of Motion     Lumbar   Flexion: Active lumbar flexion: to toes    with pain  Extension:  with pain Restriction level: minimal  Left lateral flexion:  with pain Restriction level: minimal  Right lateral flexion:  WFL  Left rotation:  WFL  Right rotation:  [facility]  Left Hip   Flexion: 100 degrees   Abduction: 35 degrees     Right Hip   Flexion: 93 degrees with pain  Abduction: 30 degrees   Left Knee   Normal active range of motion    Right Knee   Normal active range of motion    Additional Active Range of Motion Details  L SLR: 50  R SLR: 50  Mechanical Assessment    Cervical      Thoracic      Lumbar    Standing flexion: repeated movements   Pain location:no change  Standing extension: repeated movements  Pain location: no change    Strength/Myotome Testing     Left Hip   Normal muscle strength    Right Hip   Normal muscle strength    Left Knee   Normal strength    Right Knee   Normal strength    Additional Strength Details  BLE strength is WNL but pain with resistance testing for R hip flexion and abduction  Ambulation     Ambulation: Level Surfaces   Ambulation without assistive device: independent    Ambulation: Stairs   Ascend stairs: independent  Pattern: reciprocal  Descend stairs: independent  Pattern: reciprocal    Observational Gait   Gait: within functional limits                 Precautions: HTN       Manuals 6/15       RLE - Hams/Calf/Piri/  IT Band ML                               Neuro Re-Ed         MTP/LTP        PPT        PPT w/march        PPT w/SLR        PPT w/knee fallout                Postural Ed ML       Ther Ex        Bike Level 1 8 mins       SLR x 3        Bridges        Hip Add w/Ball        Lori Hip Flex        Hooklying Heel Walk        LTR        SKTC        S/L Hip Abd        Clamshells        Ther Activity        Lifting & Carrying                Gait Training                        Modalities        HP/CP prn                           Spoke with patient about spine anatomy and pathology and posture and body mechanics

## 2021-06-07 NOTE — PATIENT INSTRUCTIONS
Hipertensión, cuidados ambulatorios   INFORMACIÓN GENERAL:   La hipertensión  es la presión arterial dayanara  La presión arterial es la fuerza que ejerce la lali contra las calvillo de las arterias  La presión arterial dayanara estaría a 140 / 90 o más dayanara  La hipertensión causa que [place] presión arterial se eleve tanto que living[place] corazón se ve forzado a trabajar [employer] de lo normal, lo cual puede causar daño al corazón  Síntomas comunes incluyen los siguientes:   · Dolor de jaylin     · Visión borrosa     · Dolor de pecho     · Mareos o debilidad     · Dificultad para respirar     · Sangrado de nariz  Busque cuidados inmediatos para los siguientes síntomas:   · Dolor de jaylin severo o pérdida de la visión    · Debilidad en un brazo o pierna    · Confusión o dificultad para hablar    · [employer] en el pecho paul madai sensación de estrujamiento, presión, llenura o dolor    · Sensación de desvanecimiento repentino o dificultad para respirar    · Dolor o molestias en la espalda, mario, mandíbula, estómago o brazo  El tratamiento para la hipertensión  puede llegar a incluir medicamentos para bajar [place] presión arterial  Es probable que usted también necesite hacer algunos cambios en [place] estilo de humberto  [place] medicamento exactamente paul se lo indiquen  Maneje [place] hipertensión:   · Tómese la presión en casa  Siéntese y descanse por 5 minutos antes de tomarse la presión  Extienda [place] brazo y apóyelo sobre madai superficie plana  [place] brazo debe estar al mismo nivel que living[place] corazón  Siga las indicaciones que vienen con [place] monitor de presión arterial  Si es posible, tómese al menos 2 lecturas cada vez que se josef la presión  Tómese la presión por lo [place] al día, al mismo tiempo cada día, paul por Yogesh Trejo y la noche  Anote en living[place] diario las lecturas de living[place] presión y lleve living[place] diario a vanessa citas de seguimiento  · Coma menos sodio (sal)  No le agregue sal a vanessa alimentos   Limite living[place] consumo de alimentos altos en sodio, paul por ejemplo comidas enlatadas, latrice tostadas, y rosette para emparedado  [place] proveedor de román podría sugerirle que siga el plan de alimentación con enfoque dietético para reducir la hipertensión conocido paul dieta DASH, por vanessa siglas en inglés  Arabella plan es bajo en sodio, grasas que no son saludables y grasas en living[place] totalidad  El plan es alto en potasio, calcio y [place]  · Ejercítese regularmente  Ejercítese por lo menos 30 minutos por día, la [employer] días de la [place]  [place] ayudará a bajar [place] presión arterial  Pregúntele a living[place] proveedor de [place] plan de ejercicios para usted  · Limite el consumo de alcohol  Las mujeres deben limitar el consumo de alcohol a 1 trago al día, mientras los hombres deben limitarlo a 2 tragos al día  Un trago de alcohol equivale a 12 onzas de cerveza, 5 onzas de vino, o 1½ onzas de licor  · No fume  Si usted fuma, nunca es tarde para dejar de fumar  El tabaquismo [place] presión arterial  Fumar también empeora otras condiciones de román que usted podría tener y que a la vez aumentan living[place] riesgo de hipertensión  Solicite a living[place] proveedor de [employer] información si necesita ayuda para dejar de fumar  Programe un andrea con living[place] proveedor de román según indicaciones:  Usted tendrá que regresar para que le revisen la presión arterial y para que le eboni otras pruebas de laboratorio  Anote vanessa preguntas para que las recuerde becca vanessa citas de seguimiento  ACUERDOS SOBRE LIVINGSTON CUIDADO:   Usted tiene el derecho de participar en la planificación de living[place] cuidado  Aprenda todo lo que pueda sobre living[place] condición y paul darle tratamiento  Discuta con vanessa médicos vanessa opciones de tratamiento para juntos decidir el cuidado que usted quiere recibir  Usted siempre tiene el derecho a rechazar living[place] tratamiento  Esta información es sólo para uso en educación  [place] intención no es darle un consejo médico sobre enfermedades o tratamientos   Colsulte con livingston Divine Luis o farmacéutico antes de seguir cualquier régimen médico para saber si es seguro y efectivo para usted  © 2014 [address] is for End User's use only and may not be sold, redistributed or otherwise used for commercial purposes  All illustrations and images included in CareNotes® are the copyrighted property of A D A M , Inc  or Duarte Davies      [facility]-   [phone]

## 2021-06-07 NOTE — PROGRESS NOTES
Assessment/Plan:    Uncontrolled hypertension  BP is significantly improved in the office today at 142/90  At this time continue current regimen: lisinopril 40 mg daily and amlodipine 10 mg daily     Chronic right-sided low back pain with right-sided sciatica  Slightly improved with current regimen - continue methocarbamol 750 mg tid prn and diclofenac topical  He starts PT on 6/15/2021     Benign prostatic hyperplasia with lower urinary tract symptoms  Was previously referred to urology, they were unable to contact him to schedule appt   Patient provided with urology office contact information so that he can call and schedule appt   Continue with tamsulosin 0 4 mg HS at this time     Murmur  Systolic RSB 2/6   Check echocardiogram     Chronic right shoulder pain  Pain at [facility] joint and scapula   Check x-ray     Roby was seen today for follow-up and medication refill  Diagnoses and all orders for this visit:    Uncontrolled hypertension  -     Echo complete with contrast if indicated; Future  -     lisinopril (ZESTRIL) 40 mg tablet; Take 1 tablet (40 mg total) by mouth daily  -     amLODIPine (NORVASC) 10 mg tablet; Take 1 tablet (10 mg total) by mouth daily    Chronic right-sided low back pain with right-sided sciatica  -     methocarbamol (ROBAXIN) 750 mg tablet; Take 1 tablet (750 mg total) by mouth 3 (three) times a day as needed for muscle spasms    Chronic right shoulder pain  -     XR shoulder 2+ vw right; Future    Chronic right-sided thoracic back pain  -     XR spine thoracic 3 vw; Future    Murmur  -     Echo complete with contrast if indicated; Future    Mixed hyperlipidemia  -     atorvastatin (LIPITOR) 40 mg tablet; Take 1 tablet (40 mg total) by mouth daily    Benign prostatic hyperplasia with lower urinary tract symptoms, symptom details unspecified  -     tamsulosin (FLOMAX) 0 4 mg;  Take 1 capsule (0 4 mg total) by mouth daily with dinner        Return for RTC in 2 weeks for BP check, 8 weeks with me        Patient Instructions     Hipertensión, cuidados ambulatorios   INFORMACIÓN GENERAL:   La hipertensión  es la presión arterial dayanara  La presión arterial es la fuerza que ejerce la lali contra las calvillo de las arterias  La presión arterial dayanara estaría a 140 / 90 o más dayanara  La hipertensión causa que [place] presión arterial se eleve tanto que living[place] corazón se ve forzado a trabajar [employer] de lo normal, lo cual puede causar daño al corazón  Síntomas comunes incluyen los siguientes:   · Dolor de jaylin     · Visión borrosa     · Dolor de pecho     · Mareos o debilidad     · Dificultad para respirar     · Sangrado de nariz  Busque cuidados inmediatos para los siguientes síntomas:   · Dolor de jaylin severo o pérdida de la visión    · Debilidad en un brazo o pierna    · Confusión o dificultad para hablar    · [employer] en el pecho paul madai sensación de estrujamiento, presión, llenura o dolor    · Sensación de desvanecimiento repentino o dificultad para respirar    · Dolor o molestias en la espalda, mario, mandíbula, estómago o brazo  El tratamiento para la hipertensión  puede llegar a incluir medicamentos para bajar [place] presión arterial  Es probable que usted también necesite hacer algunos cambios en [place] estilo de humberto  [place] medicamento exactamente paul se lo indiquen  Maneje [place] hipertensión:   · Tómese la presión en casa  Siéntese y descanse por 5 minutos antes de tomarse la presión  Extienda [place] brazo y apóyelo sobre madai superficie plana  [place] brazo debe estar al mismo nivel que living[place] corazón  Siga las indicaciones que vienen con [place] monitor de presión arterial  Si es posible, tómese al menos 2 lecturas cada vez que se josef la presión  Tómese la presión por lo [employer] al día, al mismo tiempo cada día, paul por Verlean Nito y la noche  Anote en [place] diario las lecturas de living[place] presión y lleve living[place] diario a vanessa citas de seguimiento  · Coma menos sodio (sal)  No le agregue sal a vanessa alimentos  Limite [place] consumo de alimentos altos en sodio, paul por ejemplo comidas enlatadas, latrice tostadas, y rosette para emparedado  [place] proveedor de román podría sugerirle que siga el plan de alimentación con enfoque dietético para reducir la hipertensión conocido paul dieta DASH, por vanessa siglas en inglés  Arabella plan es bajo en sodio, grasas que no son saludables y grasas en [place] totalidad  El plan es alto en potasio, calcio y [place]  · Ejercítese regularmente  Ejercítese por lo menos 30 minutos por día, la [employer] días de la [place]  [place] ayudará a bajar [place] presión arterial  Pregúntele a living[place] proveedor de [place] plan de ejercicios para usted  · Limite el consumo de alcohol  Las mujeres deben limitar el consumo de alcohol a 1 trago al día, mientras los hombres deben limitarlo a 2 tragos al día  Un trago de alcohol equivale a 12 onzas de cerveza, 5 onzas de vino, o 1½ onzas de licor  · No fume  Si usted fuma, nunca es tarde para dejar de fumar  El tabaquismo [place] presión arterial  Fumar también empeora otras condiciones de román que usted podría tener y que a la vez aumentan living[place] riesgo de hipertensión  Solicite a living[place] proveedor de [employer] información si necesita ayuda para dejar de fumar  Programe un andrea con [place] proveedor de román según indicaciones:  Usted tendrá que regresar para que le revisen la presión arterial y para que le eboni otras pruebas de laboratorio  Anote vanessa preguntas para que las recuerde becca vanessa citas de seguimiento  ACUERDOS SOBRE LIVINGSTON CUIDADO:   Usted tiene el derecho de participar en la planificación de [place] cuidado  Aprenda todo lo que pueda sobre living[place] condición y paul darle tratamiento  Discuta con vanessa médicos vanessa opciones de tratamiento para juntos decidir el cuidado que usted quiere recibir  Usted siempre tiene el derecho a rechazar [place] tratamiento  Esta información es sólo para uso en educación   [place] intención no es darle un consejo [facility] o tratamientos  Colsulte con livingston Minor Lauth farmacéutico antes de seguir cualquier régimen médico para saber si es seguro y efectivo para usted  © 2014 [address] is for End User's use only and may not be sold, redistributed or otherwise used for commercial purposes  All illustrations and images included in CareNotes® are the copyrighted property of A D A M , Inc  or Duarte Davies  [facility]-   [phone]            Subjective:     Poly Ahmadi is a 45 y o  male who  has a past medical history of Enlarged prostate and Hypertension  who presented to the office today for follow up  He reports right shoulder pain today  Shoulder Pain  Patient complains of right shoulder pain  The symptoms began several years ago  Aggravating factors: work related injury  Pain is located between the neck and shoulder, around the acromioclavicular [facility]) joint and posterior/ scapula  Discomfort is described as aching  Symptoms are exacerbated by repetitive movements, overhead movements and lying on the shoulder  Evaluation to date: none  Therapy to date includes: nothing specific      The following portions of the patient's history were reviewed and updated as appropriate: allergies, current medications, past family history, past medical history, past social history, past surgical history and problem list     Current Outpatient Medications on File Prior to Visit   Medication Sig Dispense Refill    Diclofenac Sodium (VOLTAREN) 1 % Apply 2 g topically 4 (four) times a day 100 g 2    [DISCONTINUED] amLODIPine (NORVASC) 10 mg tablet Take 1 tablet (10 mg total) by mouth daily 90 tablet 0    [DISCONTINUED] atorvastatin (LIPITOR) 40 mg tablet Take 1 tablet (40 mg total) by mouth daily 90 tablet 3    [DISCONTINUED] lisinopril (ZESTRIL) 40 mg tablet Take 1 tablet (40 mg total) by mouth daily 90 tablet 0    [DISCONTINUED] methocarbamol (ROBAXIN) 750 mg tablet Take 1 tablet (750 mg total) by mouth 3 (three) times a day as needed for muscle spasms 40 tablet 0    [DISCONTINUED] tamsulosin (FLOMAX) 0 4 mg Take 1 capsule (0 4 mg total) by mouth daily with dinner 30 capsule 1    amoxicillin (AMOXIL) 500 mg capsule Take 500 mg by mouth every 6 (six) hours       No current facility-administered medications on file prior to visit  Review of Systems   Constitutional: Negative for chills and fever  HENT: Negative for ear pain and sore throat  Eyes: Negative for pain and visual disturbance  Respiratory: Negative for cough and shortness of breath  Cardiovascular: Negative for chest pain and palpitations  Gastrointestinal: Negative for abdominal pain and vomiting  Genitourinary: Negative for dysuria and hematuria  Musculoskeletal: Positive for arthralgias, back pain and myalgias  Skin: Negative for color change and rash  Neurological: Negative for seizures and syncope  All other systems reviewed and are negative  Objective:    /90 (BP Location: Left arm, Patient Position: Sitting, Cuff Size: Large) Comment: as per pt slight blurry vision, pt state took Bp med at 8am  Pulse 83   Temp 98 °F (36 7 °C) (Temporal)   Resp 20   Ht 5' 8" (1 727 m)   Wt 69 8 kg (153 lb 12 8 oz)   SpO2 97%   BMI 23 39 kg/m²     Physical Exam  Vitals signs and nursing note reviewed  Constitutional:       General: He is not in acute distress  Appearance: He is well-developed  He is not diaphoretic  HENT:      Head: Normocephalic and atraumatic  Right Ear: External ear normal       Left Ear: External ear normal    Eyes:      General:         Right eye: No discharge  Left eye: No discharge  Conjunctiva/sclera: Conjunctivae normal       Pupils: Pupils are equal, round, and reactive to light  Neck:      Musculoskeletal: Normal range of motion and neck supple  Cardiovascular:      Rate and Rhythm: Normal rate and regular rhythm        Heart sounds: Murmur present  No friction rub  No gallop  Pulmonary:      Effort: Pulmonary effort is normal  No respiratory distress  Breath sounds: Normal breath sounds  No wheezing  Abdominal:      General: Bowel sounds are normal  There is no distension  Palpations: Abdomen is soft  Tenderness: There is no abdominal tenderness  Musculoskeletal:         General: No deformity  Right shoulder: He exhibits decreased range of motion and tenderness  He exhibits normal strength  Right lower leg: No edema  Left lower leg: No edema  Lymphadenopathy:      Cervical: No cervical adenopathy  Skin:     General: Skin is warm and dry  Capillary Refill: Capillary refill takes less than 2 seconds  Findings: No rash  Neurological:      General: No focal deficit present  Mental Status: He is alert and oriented to person, place, and time     Psychiatric:         Mood and Affect: Mood normal          Behavior: Behavior normal          TONY Sheikh  06/07/21  2:09 PM

## 2021-06-15 ENCOUNTER — EVALUATION (OUTPATIENT)
Dept: PHYSICAL THERAPY | Facility: CLINIC | Age: 51
End: 2021-06-15
Payer: COMMERCIAL

## 2021-06-15 DIAGNOSIS — M54.41 CHRONIC RIGHT-SIDED LOW BACK PAIN WITH RIGHT-SIDED SCIATICA: Primary | ICD-10-CM

## 2021-06-15 DIAGNOSIS — G89.29 CHRONIC RIGHT-SIDED LOW BACK PAIN WITH RIGHT-SIDED SCIATICA: Primary | ICD-10-CM

## 2021-06-15 PROCEDURE — 97112 NEUROMUSCULAR REEDUCATION: CPT | Performed by: PHYSICAL THERAPIST

## 2021-06-15 PROCEDURE — 97161 PT EVAL LOW COMPLEX 20 MIN: CPT | Performed by: PHYSICAL THERAPIST

## 2021-06-15 PROCEDURE — 97140 MANUAL THERAPY 1/> REGIONS: CPT | Performed by: PHYSICAL THERAPIST

## 2021-06-15 PROCEDURE — 97110 THERAPEUTIC EXERCISES: CPT | Performed by: PHYSICAL THERAPIST

## 2021-06-21 ENCOUNTER — OFFICE VISIT (OUTPATIENT)
Dept: PHYSICAL THERAPY | Facility: CLINIC | Age: 51
End: 2021-06-21
Payer: COMMERCIAL

## 2021-06-21 DIAGNOSIS — M54.41 CHRONIC RIGHT-SIDED LOW BACK PAIN WITH RIGHT-SIDED SCIATICA: Primary | ICD-10-CM

## 2021-06-21 DIAGNOSIS — G89.29 CHRONIC RIGHT-SIDED LOW BACK PAIN WITH RIGHT-SIDED SCIATICA: Primary | ICD-10-CM

## 2021-06-21 PROCEDURE — 97110 THERAPEUTIC EXERCISES: CPT

## 2021-06-21 PROCEDURE — 97112 NEUROMUSCULAR REEDUCATION: CPT

## 2021-06-21 PROCEDURE — 97140 MANUAL THERAPY 1/> REGIONS: CPT

## 2021-06-21 NOTE — PROGRESS NOTES
Daily Note     Today's date: 2021  Patient name: [facility]  : 1970  MRN: [id number]  Referring provider: TONY Gilliland  Dx:   Encounter Diagnosis     ICD-10-CM    1  Chronic right-sided low back pain with right-sided sciatica  M54 41     G89 29        Start Time: 5  Stop Time: 1900  Total time in clinic (min): 45 minutes    Subjective: Patient notes "8/10" pain in the morning and at night  Objective: See treatment diary below    Assessment: Mainly utilized demonstration to help patient complete assigned TE listed below  No increase in pain during today's visit  PPT showed improvement t/o session, however, it has room for improvement  Progress as able  Plan: Continue per plan of care        Precautions: HTN     Manuals 6/15 6/21      RLE - Hams/Calf/Piri/  IT Band ML JM                              Neuro Re-Ed         MTP/LTP        PPT  5"x10      PPT w/march  x10 ea      PPT w/SLR  x10 ea      PPT w/knee fallout  x10 ea              Postural Ed ML       Ther Ex        Bike Level 1 8 mins 5 mins      SLR x 3        Bridges  x20      Hip Add w/Ball  5"x10      Lori Hip Flex  5"x10      Hooklying Heel Walk        LTR        SKTC  5"x10      S/L Hip Abd        Clamshells        Ther Activity        Lifting & Carrying                Gait Training                        Modalities        HP/CP prn

## 2021-06-23 ENCOUNTER — OFFICE VISIT (OUTPATIENT)
Dept: PHYSICAL THERAPY | Facility: CLINIC | Age: 51
End: 2021-06-23
Payer: COMMERCIAL

## 2021-06-23 DIAGNOSIS — G89.29 CHRONIC RIGHT-SIDED LOW BACK PAIN WITH RIGHT-SIDED SCIATICA: Primary | ICD-10-CM

## 2021-06-23 DIAGNOSIS — M54.41 CHRONIC RIGHT-SIDED LOW BACK PAIN WITH RIGHT-SIDED SCIATICA: Primary | ICD-10-CM

## 2021-06-23 PROCEDURE — 97140 MANUAL THERAPY 1/> REGIONS: CPT

## 2021-06-23 PROCEDURE — 97110 THERAPEUTIC EXERCISES: CPT

## 2021-06-23 PROCEDURE — 97112 NEUROMUSCULAR REEDUCATION: CPT

## 2021-06-23 NOTE — PROGRESS NOTES
Daily Note     Today's date: 2021  Patient name: [facility]  : 1970  MRN: [id number]  Referring provider: TONY Gilliland  Dx:   Encounter Diagnosis     ICD-10-CM    1  Chronic right-sided low back pain with right-sided sciatica  M54 41     G89 29                   Subjective: Patient reports continued right-sided low back pain in the morning which has been getting a little better  Pt reports no pain currently  Objective: See treatment diary below    Assessment: Pt demonstrated good tolerance to progressing core strengthening  Pt demonstrated fair ability to stabilize with bridging  Plan: Assess response to tx nv       Precautions: HTN     Manuals 6/15 6/21 6/23     RLE - Hams/Calf/Piri/  IT Band ML JM np     Graston to R > L lumbar paraspinals   8 min     Hamstring stretch   3x10"             Neuro Re-Ed         MTP/LTP        PPT  5"x10 Abdominal brace  5"x10     PPT w/march  x10 ea w/ Abdominal brace  2x10 ea     PPT w/SLR  x10 ea w/ Abdominal brace  2x10 ea     PPT w/knee fallout  x10 ea 1x10     bridges   2x10     Postural Ed ML       Ther Ex        Bike Level 1 8 mins 5 mins      SLR x 3        Bridges  x20      Hip Add w/Ball  5"x10      Lori Hip Flex  5"x10      Hooklying Heel Walk        LTR        SKTC  5"x10 5"x10     S/L Hip Abd        Clamshells   YTB/  3x10     Ther Activity        Lifting & Carrying                Gait Training                        Modalities        HP/CP prn

## 2021-06-28 ENCOUNTER — TELEPHONE (OUTPATIENT)
Dept: FAMILY MEDICINE CLINIC | Facility: CLINIC | Age: 51
End: 2021-06-28

## 2021-06-28 ENCOUNTER — OFFICE VISIT (OUTPATIENT)
Dept: PHYSICAL THERAPY | Facility: CLINIC | Age: 51
End: 2021-06-28
Payer: COMMERCIAL

## 2021-06-28 DIAGNOSIS — M54.41 CHRONIC RIGHT-SIDED LOW BACK PAIN WITH RIGHT-SIDED SCIATICA: Primary | ICD-10-CM

## 2021-06-28 DIAGNOSIS — G89.29 CHRONIC RIGHT-SIDED LOW BACK PAIN WITH RIGHT-SIDED SCIATICA: Primary | ICD-10-CM

## 2021-06-28 PROCEDURE — 97140 MANUAL THERAPY 1/> REGIONS: CPT

## 2021-06-28 PROCEDURE — 97112 NEUROMUSCULAR REEDUCATION: CPT

## 2021-06-28 PROCEDURE — 97110 THERAPEUTIC EXERCISES: CPT

## 2021-06-28 NOTE — PROGRESS NOTES
Daily Note     Today's date: 2021  Patient name: [facility]  : 1970  MRN: [id number]  Referring provider: TONY Xie Cap  Dx:   Encounter Diagnosis     ICD-10-CM    1  Chronic right-sided low back pain with right-sided sciatica  M54 41     G89 29        Subjective: Patient noted no pain currently  Objective: See treatment diary below      Assessment: Tolerated treatment fair  VC and demonstration throughout treatment for exercises  VC for TA activication throughout performing lumbar exercises  Increased reps for PPT with w/knee fallout w/o patient complaints  Patient would benefit from continued PT  Plan: Continue per plan of care        Precautions: HTN     Manuals 6/15 6/21 6/23 6/28    RLE - Hams/Calf/Piri/  IT Band ML JM np     Graston to R > L lumbar paraspinals   8 min 10 min    Hamstring stretch   3x10" 3 x10"             Neuro Re-Ed         MTP/LTP        PPT  5"x10 Abdominal brace  5"x10 Abdominal brace 5"x10    PPT w/march  x10 ea w/ Abdominal brace  2x10 ea x10ea    PPT w/SLR  x10 ea w/ Abdominal brace  2x10 ea w/ Abdominal brace  2x10 ea    PPT w/knee fallout  x10 ea 1x10 2x10    bridges   2x10 2x10    Postural Ed ML       Ther Ex        Bike Level 1 8 mins 5 mins      SLR x 3        Bridges  x20      Hip Add w/Ball  5"x10      Lori Hip Flex  5"x10      Hooklying Heel Walk        LTR        SKTC  5"x10 5"x10 5"x10    S/L Hip Abd        Clamshells   YTB/  3x10 YTB 3x10     Ther Activity        Lifting & Carrying                Gait Training                        Modalities        HP/CP prn

## 2021-06-30 ENCOUNTER — OFFICE VISIT (OUTPATIENT)
Dept: PHYSICAL THERAPY | Facility: CLINIC | Age: 51
End: 2021-06-30
Payer: COMMERCIAL

## 2021-06-30 DIAGNOSIS — M54.41 CHRONIC RIGHT-SIDED LOW BACK PAIN WITH RIGHT-SIDED SCIATICA: Primary | ICD-10-CM

## 2021-06-30 DIAGNOSIS — G89.29 CHRONIC RIGHT-SIDED LOW BACK PAIN WITH RIGHT-SIDED SCIATICA: Primary | ICD-10-CM

## 2021-06-30 PROCEDURE — 97112 NEUROMUSCULAR REEDUCATION: CPT

## 2021-06-30 PROCEDURE — 97140 MANUAL THERAPY 1/> REGIONS: CPT

## 2021-06-30 PROCEDURE — 97110 THERAPEUTIC EXERCISES: CPT

## 2021-06-30 NOTE — PROGRESS NOTES
Daily Note     Today's date: 2021  Patient name: [facility]  : 1970  MRN: [id number]  Referring provider: TONY Torres  Dx:   Encounter Diagnosis     ICD-10-CM    1  Chronic right-sided low back pain with right-sided sciatica  M54 41     G89 29        Subjective: Patient reports 6-7/10 low back pain pre tx, good response to last tx session  Objective: See treatment diary below      Assessment: Pt demonstrated good tolerance to all strength progressions and decreased pain levels following core stabilization poc  Plan: Continue per plan of care        Precautions: HTN     Manuals 6/15 6/21 6/23 6/28 6/30   RLE - Hams/Calf/Piri/  IT Band ML JM np     Graston to R > L lumbar paraspinals   8 min 10 min 10 min   Hamstring stretch   3x10" 3 x10"  3x10"           Neuro Re-Ed         MTP/LTP        PPT  5"x10 Abdominal brace  5"x10 Abdominal brace 5"x10 Abdominal brace 5"x15   PPT w/march  x10 ea w/ Abdominal brace  2x10 ea x10ea 3x10 ea   PPT w/SLR  x10 ea w/ Abdominal brace  2x10 ea w/ Abdominal brace  2x10 ea w/ Abdominal brace  2x10 ea   PPT w/knee fallout  x10 ea 1x10 2x10 2x10   bridges   2x10 2x10 3x10   Postural Ed ML       Ther Ex        Bike Level 1 8 mins 5 mins      SLR x 3        Bridges  x20      Hip Add w/Ball  5"x10      Lori Hip Flex  5"x10      Hooklying Heel Walk        LTR        SKTC  5"x10 5"x10 5"x10 5"x10   S/L Hip Abd        Clamshells   YTB/  3x10 YTB 3x10  YTB/  3x15   Ther Activity        Lifting & Carrying                Gait Training                        Modalities        HP/CP prn

## 2021-07-07 ENCOUNTER — OFFICE VISIT (OUTPATIENT)
Dept: PHYSICAL THERAPY | Facility: CLINIC | Age: 51
End: 2021-07-07
Payer: COMMERCIAL

## 2021-07-07 DIAGNOSIS — G89.29 CHRONIC RIGHT-SIDED LOW BACK PAIN WITH RIGHT-SIDED SCIATICA: Primary | ICD-10-CM

## 2021-07-07 DIAGNOSIS — M54.41 CHRONIC RIGHT-SIDED LOW BACK PAIN WITH RIGHT-SIDED SCIATICA: Primary | ICD-10-CM

## 2021-07-07 PROCEDURE — 97112 NEUROMUSCULAR REEDUCATION: CPT

## 2021-07-07 PROCEDURE — 97110 THERAPEUTIC EXERCISES: CPT

## 2021-07-07 PROCEDURE — 97140 MANUAL THERAPY 1/> REGIONS: CPT

## 2021-07-07 NOTE — PROGRESS NOTES
Daily Note     Today's date: 2021  Patient name: [facility]  : 1970  MRN: [id number]  Referring provider: TONY Weaver  Dx:   Encounter Diagnosis     ICD-10-CM    1  Chronic right-sided low back pain with right-sided sciatica  M54 41     G89 29        Subjective: Patient reports no pain for a few days following last tx session but an episode of high low back pain with repetitive forward bending a couple of days ago  Pt reports mild right-sided low back pain and right LE radicular pain into his shin pre tx  Objective: See treatment diary below      Assessment: Pt demonstrated good ability to progress core stabilization poc and no pain or radicular symptoms following tx  Plan: Continue per plan of care        Precautions: HTN     Manuals 6/15 6/21 6/23 6/28 6/30 7/7   RLE - Hams/Calf/Piri/  IT Band ML JM np      Graston to R > L lumbar paraspinals   8 min 10 min 10 min 10 min   Hamstring stretch   3x10" 3 x10"  3x10" 3x10"            Neuro Re-Ed          MTP/LTP         PPT  5"x10 Abdominal brace  5"x10 Abdominal brace 5"x10 Abdominal brace 5"x15 Abdominal brace 5"x15   PPT w/march  x10 ea w/ Abdominal brace  2x10 ea x10ea 3x10 ea 3x15 ea   PPT w/SLR  x10 ea w/ Abdominal brace  2x10 ea w/ Abdominal brace  2x10 ea w/ Abdominal brace  2x10 ea w/ Abdominal brace  3x10 ea   PPT w/knee fallout  x10 ea 1x10 2x10 2x10    bridges   2x10 2x10 3x10 3x12   Postural Ed ML        Ther Ex         Bike Level 1 8 mins 5 mins       SLR x 3         Bridges  x20       Hip Add w/Ball  5"x10       Lori Hip Flex  5"x10       Hooklying Heel Walk         LTR         SKTC  5"x10 5"x10 5"x10 5"x10 5"x10   S/L Hip Abd         Clamshells   YTB/  3x10 YTB 3x10  YTB/  3x15 YTB/  3x20   Ther Activity         Lifting & Carrying                  Gait Training                           Modalities         HP/CP prn

## 2021-07-12 ENCOUNTER — OFFICE VISIT (OUTPATIENT)
Dept: PHYSICAL THERAPY | Facility: CLINIC | Age: 51
End: 2021-07-12
Payer: COMMERCIAL

## 2021-07-12 DIAGNOSIS — G89.29 CHRONIC RIGHT-SIDED LOW BACK PAIN WITH RIGHT-SIDED SCIATICA: Primary | ICD-10-CM

## 2021-07-12 DIAGNOSIS — M54.41 CHRONIC RIGHT-SIDED LOW BACK PAIN WITH RIGHT-SIDED SCIATICA: Primary | ICD-10-CM

## 2021-07-12 PROCEDURE — 97112 NEUROMUSCULAR REEDUCATION: CPT

## 2021-07-12 PROCEDURE — 97140 MANUAL THERAPY 1/> REGIONS: CPT

## 2021-07-12 PROCEDURE — 97110 THERAPEUTIC EXERCISES: CPT

## 2021-07-15 ENCOUNTER — OFFICE VISIT (OUTPATIENT)
Dept: PHYSICAL THERAPY | Facility: CLINIC | Age: 51
End: 2021-07-15
Payer: COMMERCIAL

## 2021-07-15 DIAGNOSIS — G89.29 CHRONIC RIGHT-SIDED LOW BACK PAIN WITH RIGHT-SIDED SCIATICA: Primary | ICD-10-CM

## 2021-07-15 DIAGNOSIS — M54.41 CHRONIC RIGHT-SIDED LOW BACK PAIN WITH RIGHT-SIDED SCIATICA: Primary | ICD-10-CM

## 2021-07-15 PROCEDURE — 97110 THERAPEUTIC EXERCISES: CPT | Performed by: PHYSICAL THERAPIST

## 2021-07-15 PROCEDURE — 97112 NEUROMUSCULAR REEDUCATION: CPT | Performed by: PHYSICAL THERAPIST

## 2021-07-15 PROCEDURE — 97140 MANUAL THERAPY 1/> REGIONS: CPT | Performed by: PHYSICAL THERAPIST

## 2021-07-15 NOTE — PROGRESS NOTES
Daily Note     Today's date: 7/15/2021  Patient name: [facility]  : 1970  MRN: [id number]  Referring provider: TONY Peck  Dx:   Encounter Diagnosis     ICD-10-CM    1  Chronic right-sided low back pain with right-sided sciatica  M54 41     G89 29        Subjective: Patient reports overall improving pain levels since starting PT  He notes 8/10 pain currently worse in the morning  Objective: See treatment diary below  Lumbar ROM: flexion- severe limitations, lateral side-bending- severe limitations, extension- moderate limitations      Assessment: Pt presents with LBP with a stability and mobility preference  He demonstrated fair tolerance to core stability program requiring cueing on bracing  Added lumbar flexion stretch due to poor lumbar ROM  Improved lumbar flexion ROM noted after manuals  No pain reported at the end of session today  Plan: Continue per plan of care  Assess response to treatment NV  Add seated lumbar flexion stretch to HEP        Precautions: HTN     Manuals 7/15    6/30 7/7 7   Lumbar Flexion MWM 2x10         Graston to R > L lumbar paraspinals 8 min    10 min 10 min 10 min   Hamstring stretch     3x10" 3x10" 3x10"             Neuro Re-Ed           MTP/LTP          Abd Bracing 5"x15    Abdominal brace 5"x15 Abdominal brace 5"x15 Abdominal brace 5"x15   Abd brace w/march 3x18    3x10 ea 3x15 ea 3x20 ea   Abd Brace w/SLR 3x10 ea    w/ Abdominal brace  2x10 ea w/ Abdominal brace  3x10 ea w/ Abdominal brace  3x12 ea   PPT w/knee fallout     2x10     bridges 3x12    3x10 3x12 3x15   Postural Ed          Ther Ex          Seated Lumbar Flex St   20"x3         SKTC 5"x10    5"x10 5"x10 5"x10   S/L Hip Abd          Clamshells YTB 3x20    YTB/  3x15 YTB/  3x20 YTB/  3x20   Ther Activity          Lifting & Carrying                    Gait Training                              Modalities          HP/CP prn

## 2021-07-19 ENCOUNTER — OFFICE VISIT (OUTPATIENT)
Dept: PHYSICAL THERAPY | Facility: CLINIC | Age: 51
End: 2021-07-19
Payer: COMMERCIAL

## 2021-07-19 DIAGNOSIS — M54.41 CHRONIC RIGHT-SIDED LOW BACK PAIN WITH RIGHT-SIDED SCIATICA: Primary | ICD-10-CM

## 2021-07-19 DIAGNOSIS — G89.29 CHRONIC RIGHT-SIDED LOW BACK PAIN WITH RIGHT-SIDED SCIATICA: Primary | ICD-10-CM

## 2021-07-19 PROCEDURE — 97110 THERAPEUTIC EXERCISES: CPT | Performed by: PHYSICAL THERAPIST

## 2021-07-19 PROCEDURE — 97112 NEUROMUSCULAR REEDUCATION: CPT | Performed by: PHYSICAL THERAPIST

## 2021-07-19 PROCEDURE — 97140 MANUAL THERAPY 1/> REGIONS: CPT | Performed by: PHYSICAL THERAPIST

## 2021-07-19 NOTE — PROGRESS NOTES
Daily Note     Today's date: 2021  Patient name: [facility]  : 1970  MRN: [id number]  Referring provider: TONY Marte  Dx:   No diagnosis found  Subjective: Patient reports good days and bad days stating today is a good day with minimal pain  Objective: See treatment diary below  Lumbar ROM: flexion- severe limitations, lateral side-bending- severe limitations, extension- moderate limitations      Assessment: Updated HEP exercises to emphasize stabilization and flexion preference  Improved lumbar flexion ROM noted during stretches today  Plan: Continue per plan of care  Assess response to treatment NV         Precautions: HTN     Manuals 7/15 7/19   6/30 7/7 7/12   Lumbar Flexion MWM 2x10 2x10        Graston to R > L lumbar paraspinals 8 min 8 min   10 min 10 min 10 min   Hamstring stretch     3x10" 3x10" 3x10"             Neuro Re-Ed           MTP/LTP          Abd Bracing 5"x15 5"x15   Abdominal brace 5"x15 Abdominal brace 5"x15 Abdominal brace 5"x15   Abd brace w/march 3x18 3x18   3x10 ea 3x15 ea 3x20 ea   Abd Brace w/SLR 3x10 ea 3x10 ea   w/ Abdominal brace  2x10 ea w/ Abdominal brace  3x10 ea w/ Abdominal brace  3x12 ea   PPT w/knee fallout     2x10     bridges 3x12 3x12   3x10 3x12 3x15   Postural Ed          Ther Ex          Seated Lumbar Flex St   20"x3 20"x3        SKTC 5"x10 5"x10   5"x10 5"x10 5"x10   S/L Hip Abd          Clamshells YTB 3x20 RTB 3x15   YTB/  3x15 YTB/  3x20 YTB/  3x20   Ther Activity          Lifting & Carrying                    Gait Training                              Modalities          HP/CP prn

## 2021-07-21 ENCOUNTER — OFFICE VISIT (OUTPATIENT)
Dept: PHYSICAL THERAPY | Facility: CLINIC | Age: 51
End: 2021-07-21
Payer: COMMERCIAL

## 2021-07-21 DIAGNOSIS — M54.41 CHRONIC RIGHT-SIDED LOW BACK PAIN WITH RIGHT-SIDED SCIATICA: Primary | ICD-10-CM

## 2021-07-21 DIAGNOSIS — G89.29 CHRONIC RIGHT-SIDED LOW BACK PAIN WITH RIGHT-SIDED SCIATICA: Primary | ICD-10-CM

## 2021-07-21 PROCEDURE — 97112 NEUROMUSCULAR REEDUCATION: CPT

## 2021-07-21 PROCEDURE — 97110 THERAPEUTIC EXERCISES: CPT

## 2021-07-21 PROCEDURE — 97140 MANUAL THERAPY 1/> REGIONS: CPT

## 2021-07-21 NOTE — PROGRESS NOTES
Daily Note     Today's date: 2021  Patient name: [facility]  : 1970  MRN: [id number]  Referring provider: TONY Gamble  Dx:   Encounter Diagnosis     ICD-10-CM    1  Chronic right-sided low back pain with right-sided sciatica  M54 41     G89 29        Subjective: Patient reports decreased low back pain since last tx session  Objective: See treatment diary below  Assessment: Pt demonstrated good response to flexion and stabilization poc, decreased pain levels  Plan: Continue poc as per PT        Precautions: HTN     Manuals 7/15 7/19 7/21       Lumbar Flexion MWM 2x10 2x10 np       Graston to R > L lumbar paraspinals 8 min 8 min 10 min       Hamstring stretch                    Neuro Re-Ed           MTP/LTP          Abd Bracing 5"x15 5"x15 5"x15       Abd brace w/march 3x18 3x18 3x18       Abd Brace w/SLR 3x10 ea 3x10 ea 3x10       PPT w/knee fallout          bridges 3x12 3x12 3x15       Postural Ed          Ther Ex          Seated Lumbar Flex St   20"x3 20"x3 20"x3       SKTC 5"x10 5"x10 5"x10       S/L Hip Abd          Clamshells YTB 3x20 RTB 3x15 RTB  3x15       Ther Activity          Lifting & Carrying                    Gait Training                              Modalities          HP/CP prn

## 2021-07-27 ENCOUNTER — OFFICE VISIT (OUTPATIENT)
Dept: PHYSICAL THERAPY | Facility: CLINIC | Age: 51
End: 2021-07-27
Payer: COMMERCIAL

## 2021-07-27 DIAGNOSIS — G89.29 CHRONIC RIGHT-SIDED LOW BACK PAIN WITH RIGHT-SIDED SCIATICA: Primary | ICD-10-CM

## 2021-07-27 DIAGNOSIS — M54.41 CHRONIC RIGHT-SIDED LOW BACK PAIN WITH RIGHT-SIDED SCIATICA: Primary | ICD-10-CM

## 2021-07-27 PROCEDURE — 97110 THERAPEUTIC EXERCISES: CPT | Performed by: PHYSICAL THERAPIST

## 2021-07-27 PROCEDURE — 97140 MANUAL THERAPY 1/> REGIONS: CPT | Performed by: PHYSICAL THERAPIST

## 2021-07-27 PROCEDURE — 97112 NEUROMUSCULAR REEDUCATION: CPT | Performed by: PHYSICAL THERAPIST

## 2021-07-27 NOTE — PROGRESS NOTES
Daily Note     Today's date: 2021  Patient name: [facility]  : 1970  MRN: [id number]  Referring provider: TONY Wilson  Dx:   No diagnosis found  Subjective: Patient reports no pain currently and 3/10 right-sided LBP over weekend  Objective: See treatment diary below  Assessment: Pt demonstrated improving core stability during exercises and improved lumbar ROM  Plan: Continue poc        Precautions: HTN     Manuals 7/15 7/19 7/21 7/27      Lumbar Flexion MWM 2x10 2x10 np 2x10      Graston to R > L lumbar paraspinals 8 min 8 min 10 min 8 min      Hamstring stretch                    Neuro Re-Ed           MTP/LTP          Abd Bracing 5"x15 5"x15 5"x15 5"x15      Abd brace /march 3x18 3x18 3x18 3x20      Abd Brace w/SLR 3x10 ea 3x10 ea 3x10 3x10      PPT w/knee fallout          bridges 3x12 3x12 3x15 3x15      Postural Ed          Ther Ex          Seated Lumbar Flex St   20"x3 20"x3 20"x3 20"x4      SKTC 5"x10 5"x10 5"x10 5"x10      S/L Hip Abd          Clamshells YTB 3x20 RTB 3x15 RTB  3x15 RTB 3x15      Ther Activity          Lifting & Carrying                    Gait Training                              Modalities          HP/CP prn

## 2021-07-29 ENCOUNTER — OFFICE VISIT (OUTPATIENT)
Dept: PHYSICAL THERAPY | Facility: CLINIC | Age: 51
End: 2021-07-29
Payer: COMMERCIAL

## 2021-07-29 DIAGNOSIS — G89.29 CHRONIC RIGHT-SIDED LOW BACK PAIN WITH RIGHT-SIDED SCIATICA: Primary | ICD-10-CM

## 2021-07-29 DIAGNOSIS — M54.41 CHRONIC RIGHT-SIDED LOW BACK PAIN WITH RIGHT-SIDED SCIATICA: Primary | ICD-10-CM

## 2021-07-29 PROCEDURE — 97140 MANUAL THERAPY 1/> REGIONS: CPT | Performed by: PHYSICAL THERAPIST

## 2021-07-29 PROCEDURE — 97112 NEUROMUSCULAR REEDUCATION: CPT | Performed by: PHYSICAL THERAPIST

## 2021-07-29 PROCEDURE — 97110 THERAPEUTIC EXERCISES: CPT | Performed by: PHYSICAL THERAPIST

## 2021-07-29 NOTE — PROGRESS NOTES
Daily Note     Today's date: 2021  Patient name: [facility]  : 1970  MRN: [id number]  Referring provider: TONY Flanagan  Dx:   Encounter Diagnosis     ICD-10-CM    1  Chronic right-sided low back pain with right-sided sciatica  M54 41     G89 29        Subjective: Patient reports no pain currently and mild pain yesterday afternoon  Objective: See treatment diary below  Assessment: Patient cued to slow eccentric motion of the clamshells today with good tolerance and increased fatigue  Overall, the patient's core stability continues to slowly improve as evident by his decreased subjective pain reports  Plan: Continue poc        Precautions: HTN     Manuals 7/15 7/19 7/21 7/27 7/29     Lumbar Flexion MWM 2x10 2x10 np 2x10 2x10     Graston to R > L lumbar paraspinals 8 min 8 min 10 min 8 min 8 min     Hamstring stretch                    Neuro Re-Ed           MTP/LTP          Abd Bracing 5"x15 5"x15 5"x15 5"x15 5"x15     Abd brace /march 3x18 3x18 3x18 3x20 3x20     Abd Brace w/SLR 3x10 ea 3x10 ea 3x10 3x10 3x12     Partial Crunches          bridges 3x12 3x12 3x15 3x15 3x15     Postural Ed          Ther Ex          Seated Lumbar Flex St   20"x3 20"x3 20"x3 20"x4 20"x4     SKTC 5"x10 5"x10 5"x10 5"x10 5"x10     S/L Hip Abd          Clamshells YTB 3x20 RTB 3x15 RTB  3x15 RTB 3x15 RTB 3x15     Ther Activity          Lifting & Carrying                    Gait Training                              Modalities          HP/CP prn

## 2021-08-03 ENCOUNTER — OFFICE VISIT (OUTPATIENT)
Dept: FAMILY MEDICINE CLINIC | Facility: CLINIC | Age: 51
End: 2021-08-03

## 2021-08-03 VITALS
HEART RATE: 96 BPM | BODY MASS INDEX: 23.57 KG/M2 | SYSTOLIC BLOOD PRESSURE: 180 MMHG | RESPIRATION RATE: 19 BRPM | OXYGEN SATURATION: 98 % | WEIGHT: 155 LBS | TEMPERATURE: 97 F | DIASTOLIC BLOOD PRESSURE: 120 MMHG

## 2021-08-03 DIAGNOSIS — I10 UNCONTROLLED HYPERTENSION: Primary | ICD-10-CM

## 2021-08-03 DIAGNOSIS — M25.511 CHRONIC RIGHT SHOULDER PAIN: ICD-10-CM

## 2021-08-03 DIAGNOSIS — M54.41 CHRONIC RIGHT-SIDED LOW BACK PAIN WITH RIGHT-SIDED SCIATICA: ICD-10-CM

## 2021-08-03 DIAGNOSIS — G89.29 CHRONIC RIGHT-SIDED LOW BACK PAIN WITH RIGHT-SIDED SCIATICA: ICD-10-CM

## 2021-08-03 DIAGNOSIS — G89.29 CHRONIC RIGHT SHOULDER PAIN: ICD-10-CM

## 2021-08-03 PROCEDURE — T1015 CLINIC SERVICE: HCPCS | Performed by: NURSE PRACTITIONER

## 2021-08-03 PROCEDURE — 99214 OFFICE O/P EST MOD 30 MIN: CPT | Performed by: NURSE PRACTITIONER

## 2021-08-03 PROCEDURE — 3077F SYST BP >= 140 MM HG: CPT | Performed by: NURSE PRACTITIONER

## 2021-08-03 PROCEDURE — 3080F DIAST BP >= 90 MM HG: CPT | Performed by: NURSE PRACTITIONER

## 2021-08-03 NOTE — PATIENT INSTRUCTIONS
Heart Healthy Diet   WHAT YOU NEED TO KNOW:   A heart healthy diet is an eating plan low in unhealthy fats and sodium (salt)  The plan is high in healthy fats and fiber  A heart healthy diet helps improve your cholesterol levels and lowers your risk for heart disease and stroke  A dietitian will teach you how to read and understand food labels  DISCHARGE INSTRUCTIONS:   Heart healthy diet guidelines to follow:   · Choose foods that contain healthy fats  ? Unsaturated fats  include monounsaturated and polyunsaturated fats  Unsaturated fat is found in foods such as soybean, canola, olive, corn, and safflower oils  It is also found in soft tub margarine that is made with liquid vegetable oil  ? Omega-3 fat  is found in certain fish, such as salmon, tuna, and trout, and in walnuts and flaxseed  Eat fish high in omega-3 fats at least 2 times a week  · Get 20 to 30 grams of fiber each day  Fruits, vegetables, whole-grain foods, and legumes (cooked beans) are good sources of fiber  · Limit or do not have unhealthy fats  ? Cholesterol  is found in animal foods, such as eggs and lobster, and in dairy products made from whole milk  Limit cholesterol to less than 200 mg each day  ? Saturated fat  is found in meats, such as ramires and hamburger  It is also found in chicken or turkey skin, whole milk, and butter  Limit saturated fat to less than 7% of your total daily calories  ? Trans fat  is found in packaged foods, such as potato chips and cookies  It is also in hard margarine, some fried foods, and shortening  Do not eat foods that contain trans fats  · Limit sodium as directed  You may be told to limit sodium to 2,000 to 2,300 mg each day  Choose low-sodium or no-salt-added foods  Add little or no salt to food you prepare  Use herbs and spices in place of salt         Include the following in your heart healthy plan:  Ask your dietitian or healthcare provider how many servings to have from each of the following food groups:  · Grains:      ? Whole-wheat breads, cereals, and pastas, and brown rice    ? Low-fat, low-sodium crackers and chips    · Vegetables:      ? Broccoli, green beans, green peas, and spinach    ? Collards, kale, and lima beans    ? Carrots, sweet potatoes, tomatoes, and peppers    ? Canned vegetables with no salt added    · Fruits:      ? Bananas, peaches, pears, and pineapple    ? Grapes, raisins, and dates    ? Oranges, tangerines, grapefruit, orange juice, and grapefruit juice    ? Apricots, mangoes, melons, and papaya    ? Raspberries and strawberries    ? Canned fruit with no added sugar    · Low-fat dairy:      ? Nonfat (skim) milk, 1% milk, and low-fat almond, cashew, or soy milks fortified with calcium    ? Low-fat cheese, regular or frozen yogurt, and cottage cheese    · Meats and proteins:      ? Lean cuts of beef and pork (loin, leg, round), skinless chicken and turkey    ? Legumes, soy products, egg whites, or nuts    Limit or do not include the following in your heart healthy plan:   · Unhealthy fats and oils:      ? Whole or 2% milk, cream cheese, sour cream, or cheese    ? High-fat cuts of beef (T-bone steaks, ribs), chicken or turkey with skin, and organ meats such as liver    ? Butter, stick margarine, shortening, and cooking oils such as coconut or palm oil    · Foods and liquids high in sodium:      ? Packaged foods, such as frozen dinners, cookies, macaroni and cheese, and cereals with more than 300 mg of sodium per serving    ? Vegetables with added sodium, such as instant potatoes, vegetables with added sauces, or regular canned vegetables    ? Cured or smoked meats, such as hot dogs, ramires, and sausage    ? High-sodium ketchup, barbecue sauce, salad dressing, pickles, olives, soy sauce, or miso    · Foods and liquids high in sugar:      ? Candy, cake, cookies, pies, or doughnuts    ? Soft drinks (soda), sports drinks, or sweetened tea    ?  Canned or dry mixes for cakes, soups, sauces, or gravies    Other healthy heart guidelines:   · Do not smoke  Nicotine and other chemicals in cigarettes and cigars can cause lung and heart damage  Ask your healthcare provider for information if you currently smoke and need help to quit  E-cigarettes or smokeless tobacco still contain nicotine  Talk to your healthcare provider before you use these products  · Limit or do not drink alcohol as directed  Alcohol can damage your heart and raise your blood pressure  Your healthcare provider may give you specific daily and weekly limits  The general recommended limit is 1 drink a day for women 21 or older and for men 72 or older  Do not have more than 3 drinks in a day or 7 in a week  The recommended limit is 2 drinks a day for men 24to 59years of age  Do not have more than 4 drinks in a day or 14 in a week  A drink of alcohol is 12 ounces of beer, 5 ounces of wine, or 1½ ounces of liquor  · Exercise regularly  Exercise can help you maintain a healthy weight and improve your blood pressure and cholesterol levels  Regular exercise can also decrease your risk for heart problems  Ask your healthcare provider about the best exercise plan for you  Do not start an exercise program without asking your healthcare provider  Follow up with your doctor or cardiologist as directed:  Write down your questions so you remember to ask them during your visits  © Copyright [employer] 2021 Information is for End User's use only and may not be sold, redistributed or otherwise used for commercial purposes  All illustrations and images included in CareNotes® are the copyrighted property of A [employer] Inc  or [facility] Malorie Lemons   The above information is an  only  It is not intended as medical advice for individual conditions or treatments  Talk to your doctor, nurse or pharmacist before following any medical regimen to see if it is safe and effective for you

## 2021-08-03 NOTE — ASSESSMENT & PLAN NOTE
Blood pressure is significantly elevated in the office  180/120 and on recheck 215/120  patient states he is currently asymptomatic but has been having intermittent left chest pain with radiation to the back described as "dull pressure"  recommended EKG, clonidine in office, and/ or  ED -patient refused   Discussed with patient the adverse effects of uncontrolled hypertension, he states that he understands the potential harm of going against medical advice and continues to decline any treatment in office or by the ED

## 2021-08-03 NOTE — PROGRESS NOTES
Assessment/Plan:    Uncontrolled hypertension  Blood pressure is significantly elevated in the office  180/120 and on recheck 215/120  patient states he is currently asymptomatic but has been having intermittent left chest pain with radiation to the back described as "dull pressure"  recommended EKG, clonidine in office, and/ or  ED -patient refused   Discussed with patient the adverse effects of uncontrolled hypertension, he states that he understands the potential harm of going against medical advice and continues to decline any treatment in office or by the ED         Roby was seen today for hypertension  Diagnoses and all orders for this visit:    Uncontrolled hypertension  -     Stress test only, exercise; Future    Chronic right-sided low back pain with right-sided sciatica  -     Ambulatory referral to Pain Management; Future    Chronic right shoulder pain          Return for RTC in one week for BP check w/ nurse   Patient Instructions         Heart Healthy Diet   WHAT YOU NEED TO KNOW:   A heart healthy diet is an eating plan low in unhealthy fats and sodium (salt)  The plan is high in healthy fats and fiber  A heart healthy diet helps improve your cholesterol levels and lowers your risk for heart disease and stroke  A dietitian will teach you how to read and understand food labels  DISCHARGE INSTRUCTIONS:   Heart healthy diet guidelines to follow:   · Choose foods that contain healthy fats  ? Unsaturated fats  include monounsaturated and polyunsaturated fats  Unsaturated fat is found in foods such as soybean, canola, olive, corn, and safflower oils  It is also found in soft tub margarine that is made with liquid vegetable oil  ? Omega-3 fat  is found in certain fish, such as salmon, tuna, and trout, and in walnuts and flaxseed  Eat fish high in omega-3 fats at least 2 times a week  · Get 20 to 30 grams of fiber each day    Fruits, vegetables, whole-grain foods, and legumes (cooked beans) are good sources of fiber  · Limit or do not have unhealthy fats  ? Cholesterol  is found in animal foods, such as eggs and lobster, and in dairy products made from whole milk  Limit cholesterol to less than 200 mg each day  ? Saturated fat  is found in meats, such as ramires and hamburger  It is also found in chicken or turkey skin, whole milk, and butter  Limit saturated fat to less than 7% of your total daily calories  ? Trans fat  is found in packaged foods, such as potato chips and cookies  It is also in hard margarine, some fried foods, and shortening  Do not eat foods that contain trans fats  · Limit sodium as directed  You may be told to limit sodium to 2,000 to 2,300 mg each day  Choose low-sodium or no-salt-added foods  Add little or no salt to food you prepare  Use herbs and spices in place of salt  Include the following in your heart healthy plan:  Ask your dietitian or healthcare provider how many servings to have from each of the following food groups:  · Grains:      ? Whole-wheat breads, cereals, and pastas, and brown rice    ? Low-fat, low-sodium crackers and chips    · Vegetables:      ? Broccoli, green beans, green peas, and spinach    ? Collards, kale, and lima beans    ? Carrots, sweet potatoes, tomatoes, and peppers    ? Canned vegetables with no salt added    · Fruits:      ? Bananas, peaches, pears, and pineapple    ? Grapes, raisins, and dates    ? Oranges, tangerines, grapefruit, orange juice, and grapefruit juice    ? Apricots, mangoes, melons, and papaya    ? Raspberries and strawberries    ? Canned fruit with no added sugar    · Low-fat dairy:      ? Nonfat (skim) milk, 1% milk, and low-fat almond, cashew, or soy milks fortified with calcium    ? Low-fat cheese, regular or frozen yogurt, and cottage cheese    · Meats and proteins:      ? Lean cuts of beef and pork (loin, leg, round), skinless chicken and turkey    ?  Legumes, soy products, egg whites, or nuts    Limit or do not include the following in your heart healthy plan:   · Unhealthy fats and oils:      ? Whole or 2% milk, cream cheese, sour cream, or cheese    ? High-fat cuts of beef (T-bone steaks, ribs), chicken or turkey with skin, and organ meats such as liver    ? Butter, stick margarine, shortening, and cooking oils such as coconut or palm oil    · Foods and liquids high in sodium:      ? Packaged foods, such as frozen dinners, cookies, macaroni and cheese, and cereals with more than 300 mg of sodium per serving    ? Vegetables with added sodium, such as instant potatoes, vegetables with added sauces, or regular canned vegetables    ? Cured or smoked meats, such as hot dogs, ramires, and sausage    ? High-sodium ketchup, barbecue sauce, salad dressing, pickles, olives, soy sauce, or miso    · Foods and liquids high in sugar:      ? Candy, cake, cookies, pies, or doughnuts    ? Soft drinks (soda), sports drinks, or sweetened tea    ? Canned or dry mixes for cakes, soups, sauces, or gravies    Other healthy heart guidelines:   · Do not smoke  Nicotine and other chemicals in cigarettes and cigars can cause lung and heart damage  Ask your healthcare provider for information if you currently smoke and need help to quit  E-cigarettes or smokeless tobacco still contain nicotine  Talk to your healthcare provider before you use these products  · Limit or do not drink alcohol as directed  Alcohol can damage your heart and raise your blood pressure  Your healthcare provider may give you specific daily and weekly limits  The general recommended limit is 1 drink a day for women 21 or older and for men 72 or older  Do not have more than 3 drinks in a day or 7 in a week  The recommended limit is 2 drinks a day for men 24to 59years of age  Do not have more than 4 drinks in a day or 14 in a week  A drink of alcohol is 12 ounces of beer, 5 ounces of wine, or 1½ ounces of liquor  · Exercise regularly    Exercise can help you maintain a healthy weight and improve your blood pressure and cholesterol levels  Regular exercise can also decrease your risk for heart problems  Ask your healthcare provider about the best exercise plan for you  Do not start an exercise program without asking your healthcare provider  Follow up with your doctor or cardiologist as directed:  Write down your questions so you remember to ask them during your visits  © Copyright [employer] 2021 Information is for End User's use only and may not be sold, redistributed or otherwise used for commercial purposes  All illustrations and images included in CareNotes® are the copyrighted property of A D [employer]  or [employer]   The above information is an  only  It is not intended as medical advice for individual conditions or treatments  Talk to your doctor, nurse or pharmacist before following any medical regimen to see if it is safe and effective for you  Subjective:     Tacos Juan is a 45 y o  male who  has a past medical history of Enlarged prostate and Hypertension  who presented to the office today for follow up  He would like to review imaging results of right shoulder and upper back  Xrays done about a month ago of these areas were normal  He continues to have pain despite medication and PT  Chest Pain  Roby Louis complains of chest pain  Onset was several months ago  Symptoms have been unchanged since that time  The patient's pain is intermittent and is associated with activities  The patient describes the pain as dull, pressure and radiates to the upper back  Patient rates pain as a 6/10 in intensity  Associated symptoms are: chest pain and chest pressure/discomfort  Aggravating factors are: exercise  Alleviating factors are: none  Patient's cardiac risk factors are: hypertension, male gender and sedentary lifestyle  Patient's risk factors for DVT/PE: none   Previous cardiac testing: electrocardiogram (ECG)     The following portions of the patient's history were reviewed and updated as appropriate: allergies, current medications, past family history, past medical history, past social history, past surgical history and problem list     Current Outpatient Medications on File Prior to Visit   Medication Sig Dispense Refill    amLODIPine (NORVASC) 10 mg tablet Take 1 tablet (10 mg total) by mouth daily 90 tablet 0    atorvastatin (LIPITOR) 40 mg tablet Take 1 tablet (40 mg total) by mouth daily 90 tablet 3    Diclofenac Sodium (VOLTAREN) 1 % Apply 2 g topically 4 (four) times a day 100 g 2    lisinopril (ZESTRIL) 40 mg tablet Take 1 tablet (40 mg total) by mouth daily 90 tablet 0    methocarbamol (ROBAXIN) 750 mg tablet Take 1 tablet (750 mg total) by mouth 3 (three) times a day as needed for muscle spasms 40 tablet 0    tamsulosin (FLOMAX) 0 4 mg Take 1 capsule (0 4 mg total) by mouth daily with dinner 30 capsule 1    [DISCONTINUED] amoxicillin (AMOXIL) 500 mg capsule Take 500 mg by mouth every 6 (six) hours (Patient not taking: Reported on 8/3/2021)       No current facility-administered medications on file prior to visit  Review of Systems   Constitutional: Negative for chills and fever  HENT: Negative for ear pain and sore throat  Eyes: Negative for pain and visual disturbance  Respiratory: Positive for shortness of breath  Negative for cough  Cardiovascular: Positive for chest pain  Negative for palpitations  Gastrointestinal: Negative for abdominal pain and vomiting  Genitourinary: Negative for dysuria and hematuria  Musculoskeletal: Negative for arthralgias and back pain  Skin: Negative for color change and rash  Neurological: Negative for seizures and syncope  All other systems reviewed and are negative              Objective:    BP (!) 180/120 (BP Location: Left arm, Patient Position: Sitting, Cuff Size: Adult)   Pulse 96   Temp (!) 97 °F (36 1 °C) (Temporal)   Resp 19 Wt 70 3 kg (155 lb)   SpO2 98%   BMI 23 57 kg/m²     Physical Exam  Vitals and nursing note reviewed  Constitutional:       General: He is not in acute distress  Appearance: He is well-developed  He is not diaphoretic  HENT:      Head: Normocephalic and atraumatic  Right Ear: External ear normal       Left Ear: External ear normal    Eyes:      Conjunctiva/sclera: Conjunctivae normal       Pupils: Pupils are equal, round, and reactive to light  Cardiovascular:      Rate and Rhythm: Normal rate and regular rhythm  Pulses: Normal pulses  Heart sounds: Normal heart sounds  Pulmonary:      Effort: Pulmonary effort is normal  No respiratory distress  Breath sounds: Normal breath sounds  No wheezing  Abdominal:      General: Bowel sounds are normal  There is no distension  Palpations: Abdomen is soft  Tenderness: There is no abdominal tenderness  Musculoskeletal:         General: No deformity  Normal range of motion  Cervical back: Normal range of motion and neck supple  Lymphadenopathy:      Cervical: No cervical adenopathy  Skin:     General: Skin is warm and dry  Capillary Refill: Capillary refill takes less than 2 seconds  Neurological:      Mental Status: He is alert and oriented to person, place, and time  Psychiatric:         Mood and Affect: Affect is angry  Behavior: Behavior is uncooperative           TONY Concepcion  08/03/21  3:45 PM

## 2021-08-05 NOTE — PROGRESS NOTES
8/05/21: The patient no-showed his last two appointments   Per attendance policy, the patient is discharged from PT

## 2021-08-27 ENCOUNTER — TELEPHONE (OUTPATIENT)
Dept: FAMILY MEDICINE CLINIC | Facility: CLINIC | Age: 51
End: 2021-08-27

## 2022-01-09 DIAGNOSIS — I10 UNCONTROLLED HYPERTENSION: ICD-10-CM

## 2022-01-11 RX ORDER — LISINOPRIL 40 MG/1
TABLET ORAL
Qty: 30 TABLET | Refills: 0 | Status: SHIPPED | OUTPATIENT
Start: 2022-01-11 | End: 2022-01-11

## 2022-07-30 DIAGNOSIS — I10 UNCONTROLLED HYPERTENSION: ICD-10-CM

## 2022-08-01 RX ORDER — LISINOPRIL 40 MG/1
TABLET ORAL
Qty: 30 TABLET | Refills: 0 | Status: SHIPPED | OUTPATIENT
Start: 2022-08-01

## 2022-12-14 ENCOUNTER — OFFICE VISIT (OUTPATIENT)
Dept: FAMILY MEDICINE CLINIC | Facility: CLINIC | Age: 52
End: 2022-12-14

## 2022-12-14 ENCOUNTER — HOSPITAL ENCOUNTER (OUTPATIENT)
Dept: RADIOLOGY | Facility: HOSPITAL | Age: 52
Discharge: HOME | End: 2022-12-14

## 2022-12-14 VITALS
RESPIRATION RATE: 18 BRPM | BODY MASS INDEX: 25.44 KG/M2 | HEART RATE: 89 BPM | SYSTOLIC BLOOD PRESSURE: 122 MMHG | HEIGHT: 64 IN | TEMPERATURE: 97.8 F | OXYGEN SATURATION: 98 % | WEIGHT: 149 LBS | DIASTOLIC BLOOD PRESSURE: 76 MMHG

## 2022-12-14 DIAGNOSIS — Z00.00 ANNUAL PHYSICAL EXAM: Primary | ICD-10-CM

## 2022-12-14 DIAGNOSIS — Z11.1 TUBERCULOSIS SCREENING: ICD-10-CM

## 2022-12-14 NOTE — ASSESSMENT & PLAN NOTE
Patient has never had a PPD prior to appointment today  He did receive the BCG vaccine as a child  He reports no symptoms at this time  Recommend chest x-ray as patient previously had BCG vaccine  Patient was agreeable  Physical form placed in pending file

## 2022-12-14 NOTE — PROGRESS NOTES
[address] [facility] [place]    NAME: Man Organ  AGE: 47 y o  SEX: male  : 1970     DATE: 2022     Assessment and Plan:     Problem List Items Addressed This Visit        Other    Annual physical exam - Primary     Discussed general health recommendations and screening guidelines  Patient refuses colonoscopy at this time  Refuses screening lab work  Will recommend to patient to schedule chronic follow-up and highly recommend yearly lab work  Patient states he is agreeable to do this at the next visit  Recommend routine dental and eye exams  Patient refuses all vaccinations at this time  Next physical 1 year  BMI 25 0-25 9,adult     See counseling  Tuberculosis screening     Patient has never had a PPD prior to appointment today  He did receive the BCG vaccine as a child  He reports no symptoms at this time  Recommend chest x-ray as patient previously had BCG vaccine  Patient was agreeable  Physical form placed in pending file  Relevant Orders    XR chest pa & lateral       Immunizations and preventive care screenings were discussed with patient today  Appropriate education was printed on patient's after visit summary  Discussed risks and benefits of prostate cancer screening  We discussed the controversial history of PSA screening for prostate cancer in the [place] as well as the risk of over detection and over treatment of prostate cancer by way of PSA screening  The patient understands that PSA blood testing is an imperfect way to screen for prostate cancer and that elevated PSA levels in the blood may also be caused by infection, inflammation, prostatic trauma or manipulation, urological procedures, or by benign prostatic enlargement      The role of the digital rectal examination in prostate cancer screening was also discussed and I discussed with him that there is large interobserver variability in the findings of digital rectal examination  Counseling:  Alcohol/drug use: discussed moderation in alcohol intake, the recommendations for healthy alcohol use, and avoidance of illicit drug use  Dental Health: discussed importance of regular tooth brushing, flossing, and dental visits  Injury prevention: discussed safety/seat belts, safety helmets, smoke detectors, carbon dioxide detectors, and smoking near bedding or upholstery  Sexual health: discussed sexually transmitted diseases, partner selection, use of condoms, avoidance of unintended pregnancy, and contraceptive alternatives  · Exercise: the importance of regular exercise/physical activity was discussed  Recommend exercise 3-5 times per week for at least 30 minutes  BMI Counseling: Body mass index is 25 58 kg/m²  The BMI is above normal  Nutrition recommendations include decreasing portion sizes, encouraging healthy choices of fruits and vegetables, decreasing fast food intake, consuming healthier snacks, limiting drinks that contain sugar, moderation in carbohydrate intake, increasing intake of lean protein, reducing intake of saturated and trans fat and reducing intake of cholesterol  Exercise recommendations include moderate physical activity 150 minutes/week, exercising 3-5 times per week and strength training exercises  No pharmacotherapy was ordered  Rationale for BMI follow-up plan is due to patient being overweight or obese  Depression Screening and Follow-up Plan: Patient was screened for depression during today's encounter  They screened negative with a PHQ-2 score of 0  Return in about 1 month (around 1/14/2023) for Recheck HTN/HLD - schedule with PCP       Chief Complaint:     Chief Complaint   Patient presents with   • Physical Exam     Pt here for work physical       History of Present Illness:     Adult Annual Physical   Patient here for a comprehensive physical exam  The patient reports no problems  Diet and Physical Activity  · Diet/Nutrition: well balanced diet, limited junk food and limited fruits/vegetables  · Exercise: no formal exercise  Depression Screening  PHQ-2/9 Depression Screening    Little interest or pleasure in doing things: 0 - not at all  Feeling down, depressed, or hopeless: 0 - not at all  PHQ-2 Score: 0  PHQ-2 Interpretation: Negative depression screen       General Health  · Sleep: sleeps well and gets 7-8 hours of sleep on average  · Hearing: normal - bilateral   · Vision: no vision problems and most recent eye exam >1 year ago  · Dental: regular dental visits and brushes teeth twice daily   Health  · Symptoms include: none     Review of Systems:     Review of Systems   Constitutional: Negative for activity change, appetite change, chills, diaphoresis, fatigue, fever and unexpected weight change  HENT: Negative for congestion, dental problem, hearing loss, sore throat, tinnitus and trouble swallowing  Eyes: Negative for photophobia, pain, discharge, redness, itching and visual disturbance  Respiratory: Negative for cough, chest tightness, shortness of breath and wheezing  Cardiovascular: Negative for chest pain, palpitations and leg swelling  Gastrointestinal: Negative for abdominal pain, blood in stool, constipation, diarrhea, nausea and vomiting  Endocrine: Negative for cold intolerance, heat intolerance, polydipsia, polyphagia and polyuria  Musculoskeletal: Negative for arthralgias and myalgias  Skin: Negative for rash  Neurological: Negative for dizziness, tremors, syncope, weakness, light-headedness, numbness and headaches  Hematological: Negative for adenopathy  Psychiatric/Behavioral: Negative for dysphoric mood, self-injury, sleep disturbance and suicidal ideas  The patient is not nervous/anxious         Past Medical History:     Past Medical History:   Diagnosis Date   • Enlarged prostate    • Hypertension       Past Surgical History:     Past Surgical History:   Procedure Laterality Date   • BREAST MASS EXCISION Right 1994   • NOSE SURGERY     • SHOULDER SURGERY      left shoulder    • WISDOM TOOTH EXTRACTION        Family History:     Family History   Problem Relation Age of Onset   • No Known Problems Mother    • No Known Problems Father       Social History:     Social History     Socioeconomic History   • Marital status: Single     Spouse name: None   • Number of children: None   • Years of education: None   • Highest education level: None   Occupational History   • None   Tobacco Use   • Smoking status: Never   • Smokeless tobacco: Never   Substance and Sexual Activity   • Alcohol use: Yes     Comment: occasional    • Drug use: Never   • Sexual activity: None   Other Topics Concern   • None   Social History Narrative   • None     Social Determinants of Health     Financial Resource Strain: Low Risk    • Difficulty of Paying Living Expenses: Not very hard   Food Insecurity: No Food Insecurity   • Worried About Running Out of Food in the Last Year: Never true   • Ran Out of Food in the Last Year: Never true   Transportation Needs: No Transportation Needs   • Lack of Transportation (Medical): No   • Lack of Transportation (Non-Medical):  No   Physical Activity: Not on file   Stress: Not on file   Social Connections: Not on file   Intimate Partner Violence: Not on file   Housing Stability: Not on file      Current Medications:     Current Outpatient Medications   Medication Sig Dispense Refill   • amLODIPine (NORVASC) 10 mg tablet Take 1 tablet (10 mg total) by mouth daily 90 tablet 0   • atorvastatin (LIPITOR) 40 mg tablet TAKE 1 TABLET BY MOUTH EVERY DAY 30 tablet 0   • Diclofenac Sodium (VOLTAREN) 1 % Apply 2 g topically 4 (four) times a day 100 g 2   • lisinopril (ZESTRIL) 40 mg tablet TAKE 1 TABLET BY MOUTH EVERY DAY 30 tablet 0   • methocarbamol (ROBAXIN) 750 mg tablet Take 1 tablet (750 mg total) by mouth 3 (three) times a day as needed for muscle spasms 40 tablet 0   • tamsulosin (FLOMAX) 0 4 mg Take 1 capsule (0 4 mg total) by mouth daily with dinner 30 capsule 1     No current facility-administered medications for this visit  Allergies:     No Known Allergies   Physical Exam:     /76   Pulse 89   Temp 97 8 °F (36 6 °C) (Temporal)   Resp 18   Ht 5' 4" (1 626 m)   Wt 67 6 kg (149 lb)   SpO2 98%   BMI 25 58 kg/m²     Physical Exam  Vitals and nursing note reviewed  Constitutional:       General: He is awake  He is not in acute distress  Appearance: Normal appearance  He is well-developed, well-groomed and normal weight  He is not ill-appearing  HENT:      Head: Normocephalic and atraumatic  Right Ear: Hearing, tympanic membrane, ear canal and external ear normal       Left Ear: Hearing, tympanic membrane, ear canal and external ear normal       Nose: Nose normal  No congestion or rhinorrhea  Mouth/Throat:      Lips: Pink  Mouth: Mucous membranes are moist       Pharynx: Oropharynx is clear  Uvula midline  No oropharyngeal exudate or posterior oropharyngeal erythema  Eyes:      General: Lids are normal  No scleral icterus  Extraocular Movements: Extraocular movements intact  Conjunctiva/sclera: Conjunctivae normal       Pupils: Pupils are equal, round, and reactive to light  Cardiovascular:      Rate and Rhythm: Normal rate and regular rhythm  Pulses: Normal pulses  Heart sounds: Normal heart sounds  No murmur heard  Pulmonary:      Effort: Pulmonary effort is normal  No respiratory distress  Breath sounds: Normal breath sounds and air entry  No decreased air movement  No decreased breath sounds, wheezing, rhonchi or rales  Abdominal:      General: Abdomen is flat  Bowel sounds are normal  There is no distension  Palpations: Abdomen is soft  There is no mass  Tenderness: There is no abdominal tenderness   There is no right CVA tenderness, left CVA tenderness, guarding or rebound  Hernia: No hernia is present  Musculoskeletal:         General: Normal range of motion  Cervical back: Full passive range of motion without pain, normal range of motion and neck supple  Right lower leg: No edema  Left lower leg: No edema  Lymphadenopathy:      Cervical: No cervical adenopathy  Skin:     General: Skin is warm  Capillary Refill: Capillary refill takes less than 2 seconds  Coloration: Skin is not jaundiced  Findings: No bruising, erythema or lesion  Neurological:      General: No focal deficit present  Mental Status: He is alert and oriented to person, place, and time  Mental status is at baseline  Sensory: Sensation is intact  Motor: Motor function is intact  Coordination: Coordination is intact  Gait: Gait is intact  Psychiatric:         Attention and Perception: Attention normal          Mood and Affect: Mood and affect normal          Speech: Speech normal          Behavior: Behavior normal  Behavior is cooperative  Thought Content:  Thought content normal          Cognition and Memory: Cognition normal           ANDREWS Lang

## 2022-12-14 NOTE — ASSESSMENT & PLAN NOTE
Discussed general health recommendations and screening guidelines  Patient refuses colonoscopy at this time  Refuses screening lab work  Will recommend to patient to schedule chronic follow-up and highly recommend yearly lab work  Patient states he is agreeable to do this at the next visit  Recommend routine dental and eye exams  Patient refuses all vaccinations at this time  Next physical 1 year

## 2022-12-14 NOTE — PATIENT INSTRUCTIONS
Wellness Visit for Adults   AMBULATORY CARE:   A wellness visit  is when you see your healthcare provider to get screened for health problems  Your healthcare provider will also give you advice on how to stay healthy  Write down your questions so you remember to ask them  Ask your healthcare provider how often you should have a wellness visit  What happens at a wellness visit:  Your healthcare provider will ask about your health, and your family history of health problems  This includes high blood pressure, heart disease, and cancer  He or she will ask if you have symptoms that concern you, if you smoke, and about your mood  You may also be asked about your intake of medicines, supplements, food, and alcohol  Any of the following may be done:  · Your weight  will be checked  Your height may also be checked so your body mass index (BMI) can be calculated  Your BMI shows if you are at a healthy weight  · Your blood pressure  and heart rate will be checked  Your temperature may also be checked  · Blood and urine tests  may be done  Blood tests may be done to check your cholesterol levels  Abnormal cholesterol levels increase your risk for heart disease and stroke  You may also need a blood or urine test to check for diabetes if you are at increased risk  Urine tests may be done to look for signs of an infection or kidney disease  · A physical exam  includes checking your heartbeat and lungs with a stethoscope  Your healthcare provider may also check your skin to look for sun damage  · Screening tests  may be recommended  A screening test is done to check for diseases that may not cause symptoms  The screening tests you may need depend on your age, gender, family history, and lifestyle habits  For example, colorectal screening may be recommended if you are 48years old or older  Screening tests you need if you are a woman:   · A Pap smear  is used to screen for cervical cancer   Pap smears are usually done every 3 to 5 years depending on your age  You may need them more often if you have had abnormal Pap smear test results in the past  Ask your healthcare provider how often you should have a Pap smear  · A mammogram  is an x-ray of your breasts to screen for breast cancer  Experts recommend mammograms every 2 years starting at age 48 years  You may need a mammogram at age 52 years or younger if you have an increased risk for breast cancer  Talk to your healthcare provider about when you should start having mammograms and how often you need them  Vaccines you may need:   · Get an influenza vaccine  every year  The influenza vaccine protects you from the flu  Several types of viruses cause the flu  The viruses change over time, so new vaccines are made each year  · Get a tetanus-diphtheria (Td) booster vaccine  every 10 years  This vaccine protects you against tetanus and diphtheria  Tetanus is a severe infection that may cause painful muscle spasms and lockjaw  Diphtheria is a severe bacterial infection that causes a thick covering in the back of your mouth and throat  · Get a human papillomavirus (HPV) vaccine  if you are female and aged 23 to 32 or male 23 to 24 and never received it  This vaccine protects you from HPV infection  HPV is the most common infection spread by sexual contact  HPV may also cause vaginal, penile, and anal cancers  · Get a pneumococcal vaccine  if you are aged 72 years or older  The pneumococcal vaccine is an injection given to protect you from pneumococcal disease  Pneumococcal disease is an infection caused by pneumococcal bacteria  The infection may cause pneumonia, meningitis, or an ear infection  · Get a shingles vaccine  if you are 60 or older, even if you have had shingles before  The shingles vaccine is an injection to protect you from the varicella-zoster virus  This is the same virus that causes chickenpox   Shingles is a painful rash that develops in people who had chickenpox or have been exposed to the virus  How to eat healthy:  My Plate is a model for planning healthy meals  It shows the types and amounts of foods that should go on your plate  Fruits and vegetables make up about half of your plate, and grains and protein make up the other half  A serving of dairy is included on the side of your plate  The amount of calories and serving sizes you need depends on your age, gender, weight, and height  Examples of healthy foods are listed below:  · Eat a variety of vegetables  such as dark green, red, and orange vegetables  You can also include canned vegetables low in sodium (salt) and frozen vegetables without added butter or sauces  · Eat a variety of fresh fruits , canned fruit in 100% juice, frozen fruit, and dried fruit  · Include whole grains  At least half of the grains you eat should be whole grains  Examples include whole-wheat bread, wheat pasta, brown rice, and whole-grain cereals such as oatmeal     · Eat a variety of protein foods such as seafood (fish and shellfish), lean meat, and poultry without skin (turkey and chicken)  Examples of lean meats include pork leg, shoulder, or tenderloin, and beef round, sirloin, tenderloin, and extra lean ground beef  Other protein foods include eggs and egg substitutes, beans, peas, soy products, nuts, and seeds  · Choose low-fat dairy products such as skim or 1% milk or low-fat yogurt, cheese, and cottage cheese  · Limit unhealthy fats  such as butter, hard margarine, and shortening  Exercise:  Exercise at least 30 minutes per day on most days of the week  Some examples of exercise include walking, biking, dancing, and swimming  You can also fit in more physical activity by taking the stairs instead of the elevator or parking farther away from stores  Include muscle strengthening activities 2 days each week  Regular exercise provides many health benefits   It helps you manage your weight, and decreases your risk for type 2 diabetes, heart disease, stroke, and high blood pressure  Exercise can also help improve your mood  Ask your healthcare provider about the best exercise plan for you  General health and safety guidelines:   · Do not smoke  Nicotine and other chemicals in cigarettes and cigars can cause lung damage  Ask your healthcare provider for information if you currently smoke and need help to quit  E-cigarettes or smokeless tobacco still contain nicotine  Talk to your healthcare provider before you use these products  · Limit alcohol  A drink of alcohol is 12 ounces of beer, 5 ounces of wine, or 1½ ounces of liquor  · Lose weight, if needed  Being overweight increases your risk of certain health conditions  These include heart disease, high blood pressure, type 2 diabetes, and certain types of cancer  · Protect your skin  Do not sunbathe or use tanning beds  Use sunscreen with a SPF 15 or higher  Apply sunscreen at least 15 minutes before you go outside  Reapply sunscreen every 2 hours  Wear protective clothing, hats, and sunglasses when you are outside  · Drive safely  Always wear your seatbelt  Make sure everyone in your car wears a seatbelt  A seatbelt can save your life if you are in an accident  Do not use your cell phone when you are driving  This could distract you and cause an accident  Pull over if you need to make a call or send a text message  · Practice safe sex  Use latex condoms if are sexually active and have more than one partner  Your healthcare provider may recommend screening tests for sexually transmitted infections (STIs)  · Wear helmets, lifejackets, and protective gear  Always wear a helmet when you ride a bike or motorcycle, go skiing, or play sports that could cause a head injury  Wear protective equipment when you play sports  Wear a lifejacket when you are on a boat or doing water sports      © Copyright [address] 2022 Information is for End User's use only and may not be sold, redistributed or otherwise used for commercial purposes  All illustrations and images included in CareNotes® are the copyrighted property of [employer] D [employer] M [employer]  or Angela Bartlett  The above information is an  only  It is not intended as medical advice for individual conditions or treatments  Talk to your doctor, nurse or pharmacist before following any medical regimen to see if it is safe and effective for you  Weight Management   AMBULATORY CARE:   Why it is important to manage your weight:  Being overweight increases your risk of health conditions such as heart disease, high blood pressure, type 2 diabetes, and certain types of cancer  It can also increase your risk for osteoarthritis, sleep apnea, and other respiratory problems  Aim for a slow, steady weight loss  Even a small amount of weight loss can lower your risk of health problems  Risks of being overweight:  Extra weight can cause many health problems, including the following:  · Diabetes (high blood sugar level)    · High blood pressure or high cholesterol    · Heart disease    · Stroke    · Gallbladder or liver disease    · Cancer of the colon, breast, prostate, liver, or kidney    · Sleep apnea    · Arthritis or gout    Screening  is done to check for health conditions before you have signs or symptoms  If you are 28to 79years old, your blood sugar level may be checked every 3 years for signs of prediabetes or diabetes  Your healthcare provider will check your blood pressure at each visit  High blood pressure can lead to a stroke or other problems  Your provider may check for signs of heart disease, cancer, or other health problems  How to lose weight safely:  A safe and healthy way to lose weight is to eat fewer calories and get regular exercise  · You can lose up about 1 pound a week by decreasing the number of calories you eat by 500 calories each day   You can decrease calories by eating smaller portion sizes or by cutting out high-calorie foods  Read labels to find out how many calories are in the foods you eat  · You can also burn calories with exercise such as walking, swimming, or biking  You will be more likely to keep weight off if you make these changes part of your lifestyle  Exercise at least 30 minutes per day on most days of the week  You can also fit in more physical activity by taking the stairs instead of the elevator or parking farther away from stores  Ask your healthcare provider about the best exercise plan for you  Healthy meal plan for weight management:  A healthy meal plan includes a variety of foods, contains fewer calories, and helps you stay healthy  A healthy meal plan includes the following:     · Eat whole-grain foods more often  A healthy meal plan should contain fiber  Fiber is the part of grains, fruits, and vegetables that is not broken down by your body  Whole-grain foods are healthy and provide extra fiber in your diet  Some examples of whole-grain foods are whole-wheat breads and pastas, oatmeal, brown rice, and bulgur  · Eat a variety of vegetables every day  Include dark, leafy greens such as spinach, kale, yao greens, and mustard greens  Eat yellow and orange vegetables such as carrots, sweet potatoes, and winter squash  · Eat a variety of fruits every day  Choose fresh or canned fruit (canned in its own juice or light syrup) instead of juice  Fruit juice has very little or no fiber  · Eat low-fat dairy foods  Drink fat-free (skim) milk or 1% milk  Eat fat-free yogurt and low-fat cottage cheese  Try low-fat cheeses such as mozzarella and other reduced-fat cheeses  · Choose meat and other protein foods that are low in fat  Choose beans or other legumes such as split peas or lentils  Choose fish, skinless poultry (chicken or turkey), or lean cuts of red meat (beef or pork)   Before you cook meat or poultry, cut off any visible fat  · Use less fat and oil  Try baking foods instead of frying them  Add less fat, such as margarine, sour cream, regular salad dressing and mayonnaise to foods  Eat fewer high-fat foods  Some examples of high-fat foods include french fries, doughnuts, ice cream, and cakes  · Eat fewer sweets  Limit foods and drinks that are high in sugar  This includes candy, cookies, regular soda, and sweetened drinks  Ways to decrease calories:   · Eat smaller portions  ? Use a small plate with smaller servings  ? Do not eat second helpings  ? When you eat at a restaurant, ask for a box and place half of your meal in the box before you eat  ? Share an entrée with someone else  · Replace high-calorie snacks with healthy, low-calorie snacks  ? Choose fresh fruit, vegetables, fat-free rice cakes, or air-popped popcorn instead of potato chips, nuts, or chocolate  ? Choose water or calorie-free drinks instead of soda or sweetened drinks  · Do not shop for groceries when you are hungry  You may be more likely to make unhealthy food choices  Take a grocery list of healthy foods and shop after you have eaten  · Eat regular meals  Do not skip meals  Skipping meals can lead to overeating later in the day  This can make it harder for you to lose weight  Eat a healthy snack in place of a meal if you do not have time to eat a regular meal  Talk with a dietitian to help you create a meal plan and schedule that is right for you  Other things to consider as you try to lose weight:   · Be aware of situations that may give you the urge to overeat, such as eating while watching television  Find ways to avoid these situations  For example, read a book, go for a walk, or do crafts  · Meet with a weight loss support group or friends who are also trying to lose weight  This may help you stay motivated to continue working on your weight loss goals      © Copyright [employer] 2022 Information is for End User's use only and may not be sold, redistributed or otherwise used for commercial purposes  All illustrations and images included in CareNotes® are the copyrighted property of A D A M [employer] Inc  or Angela Bartlett  The above information is an  only  It is not intended as medical advice for individual conditions or treatments  Talk to your doctor, nurse or pharmacist before following any medical regimen to see if it is safe and effective for you

## 2022-12-15 ENCOUNTER — TELEPHONE (OUTPATIENT)
Dept: FAMILY MEDICINE CLINIC | Facility: CLINIC | Age: 52
End: 2022-12-15

## 2022-12-15 NOTE — TELEPHONE ENCOUNTER
Physical form completed and given to  Gila as pt is here for       Copy made for chart and placed in pod 2   bin

## 2023-02-12 PROBLEM — Z11.1 TUBERCULOSIS SCREENING: Status: RESOLVED | Noted: 2022-12-14 | Resolved: 2023-02-12

## 2023-08-24 ENCOUNTER — HOSPITAL ENCOUNTER (EMERGENCY)
Facility: HOSPITAL | Age: 53
Discharge: HOME | End: 2023-08-24
Attending: EMERGENCY MEDICINE
Payer: COMMERCIAL

## 2023-08-24 ENCOUNTER — APPOINTMENT (EMERGENCY)
Dept: CT IMAGING | Facility: HOSPITAL | Age: 53
End: 2023-08-24
Payer: COMMERCIAL

## 2023-08-24 VITALS
WEIGHT: 144.62 LBS | BODY MASS INDEX: 24.82 KG/M2 | RESPIRATION RATE: 18 BRPM | DIASTOLIC BLOOD PRESSURE: 106 MMHG | OXYGEN SATURATION: 98 % | SYSTOLIC BLOOD PRESSURE: 165 MMHG | HEART RATE: 84 BPM | TEMPERATURE: 98.2 F

## 2023-08-24 DIAGNOSIS — R33.8 ACUTE URINARY RETENTION: Primary | ICD-10-CM

## 2023-08-24 DIAGNOSIS — N40.1 BENIGN PROSTATIC HYPERPLASIA WITH LOWER URINARY TRACT SYMPTOMS, SYMPTOM DETAILS UNSPECIFIED: ICD-10-CM

## 2023-08-24 LAB
ALBUMIN SERPL BCP-MCNC: 4.1 G/DL (ref 3.5–5)
ALP SERPL-CCNC: 71 U/L (ref 34–104)
ALT SERPL W P-5'-P-CCNC: 15 U/L (ref 7–52)
ANION GAP SERPL CALCULATED.3IONS-SCNC: 10 MMOL/L
AST SERPL W P-5'-P-CCNC: 17 U/L (ref 13–39)
BACTERIA UR QL AUTO: ABNORMAL /HPF
BASOPHILS # BLD AUTO: 0.07 THOUSANDS/ÂΜL (ref 0–0.1)
BASOPHILS NFR BLD AUTO: 1 % (ref 0–1)
BILIRUB SERPL-MCNC: 0.25 MG/DL (ref 0.2–1)
BILIRUB UR QL STRIP: NEGATIVE
BUN SERPL-MCNC: 19 MG/DL (ref 5–25)
CALCIUM SERPL-MCNC: 9.2 MG/DL (ref 8.4–10.2)
CHLORIDE SERPL-SCNC: 103 MMOL/L (ref 96–108)
CLARITY UR: CLEAR
CO2 SERPL-SCNC: 28 MMOL/L (ref 21–32)
COLOR UR: ABNORMAL
CREAT SERPL-MCNC: 1.04 MG/DL (ref 0.6–1.3)
EOSINOPHIL # BLD AUTO: 0.16 THOUSAND/ÂΜL (ref 0–0.61)
EOSINOPHIL NFR BLD AUTO: 3 % (ref 0–6)
ERYTHROCYTE [DISTWIDTH] IN BLOOD BY AUTOMATED COUNT: 13 % (ref 11.6–15.1)
GFR SERPL CREATININE-BSD FRML MDRD: 81 ML/MIN/1.73SQ M
GLUCOSE SERPL-MCNC: 100 MG/DL (ref 65–140)
GLUCOSE UR STRIP-MCNC: NEGATIVE MG/DL
HCT VFR BLD AUTO: 37.9 % (ref 36.5–49.3)
HGB BLD-MCNC: 12.9 G/DL (ref 12–17)
HGB UR QL STRIP.AUTO: 250
IMM GRANULOCYTES # BLD AUTO: 0.01 THOUSAND/UL (ref 0–0.2)
IMM GRANULOCYTES NFR BLD AUTO: 0 % (ref 0–2)
KETONES UR STRIP-MCNC: NEGATIVE MG/DL
LEUKOCYTE ESTERASE UR QL STRIP: NEGATIVE
LYMPHOCYTES # BLD AUTO: 1.71 THOUSANDS/ÂΜL (ref 0.6–4.47)
LYMPHOCYTES NFR BLD AUTO: 27 % (ref 14–44)
MCH RBC QN AUTO: 29.2 PG (ref 26.8–34.3)
MCHC RBC AUTO-ENTMCNC: 34 G/DL (ref 31.4–37.4)
MCV RBC AUTO: 86 FL (ref 82–98)
MONOCYTES # BLD AUTO: 0.39 THOUSAND/ÂΜL (ref 0.17–1.22)
MONOCYTES NFR BLD AUTO: 6 % (ref 4–12)
NEUTROPHILS # BLD AUTO: 4 THOUSANDS/ÂΜL (ref 1.85–7.62)
NEUTS SEG NFR BLD AUTO: 63 % (ref 43–75)
NITRITE UR QL STRIP: NEGATIVE
NON-SQ EPI CELLS URNS QL MICRO: ABNORMAL /HPF
NRBC BLD AUTO-RTO: 0 /100 WBCS
PH UR STRIP.AUTO: 6 [PH]
PLATELET # BLD AUTO: 249 THOUSANDS/UL (ref 149–390)
PMV BLD AUTO: 9.7 FL (ref 8.9–12.7)
POTASSIUM SERPL-SCNC: 3.6 MMOL/L (ref 3.5–5.3)
PROT SERPL-MCNC: 7.6 G/DL (ref 6.4–8.4)
PROT UR STRIP-MCNC: ABNORMAL MG/DL
RBC # BLD AUTO: 4.42 MILLION/UL (ref 3.88–5.62)
RBC #/AREA URNS AUTO: ABNORMAL /HPF
SODIUM SERPL-SCNC: 141 MMOL/L (ref 135–147)
SP GR UR STRIP.AUTO: 1.01 (ref 1–1.04)
UROBILINOGEN UA: NEGATIVE MG/DL
WBC # BLD AUTO: 6.34 THOUSAND/UL (ref 4.31–10.16)
WBC #/AREA URNS AUTO: ABNORMAL /HPF

## 2023-08-24 PROCEDURE — 81003 URINALYSIS AUTO W/O SCOPE: CPT

## 2023-08-24 PROCEDURE — 96361 HYDRATE IV INFUSION ADD-ON: CPT

## 2023-08-24 PROCEDURE — 99285 EMERGENCY DEPT VISIT HI MDM: CPT

## 2023-08-24 PROCEDURE — 36415 COLL VENOUS BLD VENIPUNCTURE: CPT

## 2023-08-24 PROCEDURE — 96374 THER/PROPH/DIAG INJ IV PUSH: CPT

## 2023-08-24 PROCEDURE — 74177 CT ABD & PELVIS W/CONTRAST: CPT

## 2023-08-24 PROCEDURE — 99284 EMERGENCY DEPT VISIT MOD MDM: CPT

## 2023-08-24 PROCEDURE — 81001 URINALYSIS AUTO W/SCOPE: CPT

## 2023-08-24 PROCEDURE — 85025 COMPLETE CBC W/AUTO DIFF WBC: CPT

## 2023-08-24 PROCEDURE — 80053 COMPREHEN METABOLIC PANEL: CPT

## 2023-08-24 PROCEDURE — G1004 CDSM NDSC: HCPCS

## 2023-08-24 RX ORDER — KETOROLAC TROMETHAMINE 30 MG/ML
15 INJECTION, SOLUTION INTRAMUSCULAR; INTRAVENOUS ONCE
Status: COMPLETED | OUTPATIENT
Start: 2023-08-24 | End: 2023-08-24

## 2023-08-24 RX ADMIN — SODIUM CHLORIDE 1000 ML: 0.9 INJECTION, SOLUTION INTRAVENOUS at 02:14

## 2023-08-24 RX ADMIN — KETOROLAC TROMETHAMINE 15 MG: 30 INJECTION, SOLUTION INTRAMUSCULAR; INTRAVENOUS at 02:13

## 2023-08-24 RX ADMIN — IOHEXOL 85 ML: 350 INJECTION, SOLUTION INTRAVENOUS at 03:17

## 2023-08-24 NOTE — ED PROVIDER NOTES
History  Chief Complaint   Patient presents with   • Difficulty Urinating     Reports no urination since 9pm tonight and despite trying to urinate since then is unable to. States he knows he needs a catheter like he did 4 years ago when this same thing happened. HPI    Prior to Admission Medications   Prescriptions Last Dose Informant Patient Reported? Taking? Diclofenac Sodium (VOLTAREN) 1 %   No No   Sig: Apply 2 g topically 4 (four) times a day   amLODIPine (NORVASC) 10 mg tablet   No No   Sig: Take 1 tablet (10 mg total) by mouth daily   atorvastatin (LIPITOR) 40 mg tablet   No No   Sig: TAKE 1 TABLET BY MOUTH EVERY DAY   lisinopril (ZESTRIL) 40 mg tablet   No No   Sig: TAKE 1 TABLET BY MOUTH EVERY DAY   methocarbamol (ROBAXIN) 750 mg tablet   No No   Sig: Take 1 tablet (750 mg total) by mouth 3 (three) times a day as needed for muscle spasms   tamsulosin (FLOMAX) 0.4 mg   No No   Sig: Take 1 capsule (0.4 mg total) by mouth daily with dinner      Facility-Administered Medications: None       Past Medical History:   Diagnosis Date   • Enlarged prostate    • Hypertension        Past Surgical History:   Procedure Laterality Date   • BREAST MASS EXCISION Right 1994   • NOSE SURGERY     • SHOULDER SURGERY      left shoulder    • WISDOM TOOTH EXTRACTION         Family History   Problem Relation Age of Onset   • No Known Problems Mother    • No Known Problems Father      I have reviewed and agree with the history as documented.     E-Cigarette/Vaping   • E-Cigarette Use Never User      E-Cigarette/Vaping Substances     Social History     Tobacco Use   • Smoking status: Never   • Smokeless tobacco: Never   Vaping Use   • Vaping Use: Never used   Substance Use Topics   • Alcohol use: Not Currently     Comment: occasional    • Drug use: Never       Review of Systems    Physical Exam  Physical Exam    Vital Signs  ED Triage Vitals [08/24/23 0143]   Temperature Pulse Respirations Blood Pressure SpO2   98.2 °F (36.8 °C) 104 20 (!) 178/103 100 %      Temp Source Heart Rate Source Patient Position - Orthostatic VS BP Location FiO2 (%)   Tympanic Monitor Lying Left arm --      Pain Score       --           Vitals:    08/24/23 0143   BP: (!) 178/103   Pulse: 104   Patient Position - Orthostatic VS: Lying         Visual Acuity      ED Medications  Medications   sodium chloride 0.9 % bolus 1,000 mL (has no administration in time range)   ketorolac (TORADOL) injection 15 mg (has no administration in time range)       Diagnostic Studies  Results Reviewed     Procedure Component Value Units Date/Time    CBC and differential [[id number]]     Lab Status: No result Specimen: Blood     Comprehensive metabolic panel [[id number]]     Lab Status: No result Specimen: Blood     UA w Reflex to Microscopic w Reflex to Culture [[id number]]     Lab Status: No result Specimen: Urine                  CT abdomen pelvis with contrast    (Results Pending)              Procedures  Procedures         ED Course                               SBIRT 22yo+    Flowsheet Row Most Recent Value   Initial Alcohol Screen: US AUDIT-C     1. How often do you have a drink containing alcohol? 0 Filed at: 08/24/2023 0140   2. How many drinks containing alcohol do you have on a typical day you are drinking? 0 Filed at: 08/24/2023 0140   3a. Male UNDER 65: How often do you have five or more drinks on one occasion? 0 Filed at: 08/24/2023 0140   3b. FEMALE Any Age, or MALE 65+: How often do you have 4 or more drinks on one occassion? 0 Filed at: 08/24/2023 0140   Audit-C Score 0 Filed at: 08/24/2023 0140   DENICE: How many times in the past year have you. .. Used an illegal drug or used a prescription medication for non-medical reasons?  Never Filed at: 08/24/2023 0140                    MDM    Disposition  Final diagnoses:   None     ED Disposition     None      Follow-up Information    None         Patient's Medications   Discharge Prescriptions    No medications on file       No discharge procedures on file.     PDMP Review     None          ED Provider  Electronically Signed by           Minor Brown DO  08/24/23 4333

## 2023-08-24 NOTE — Clinical Note
Krista Pagan was seen and treated in our emergency department on 8/24/2023. Diagnosis:     [address] [facility] [place]  may return to work on return date. He may return on this date: 08/29/2023         If you have any questions or concerns, please don't hesitate to call.       Chelo Wolf PA-C    ______________________________           _______________          _______________  Hospital Representative                              Date                                Time

## 2023-08-24 NOTE — ED PROVIDER NOTES
History  Chief Complaint   Patient presents with   • Difficulty Urinating     Reports no urination since 9pm tonight and despite trying to urinate since then is unable to. States he knows he needs a catheter like he did 4 years ago when this same thing happened. The patient is a 76-year-old male with a past medical history of hypertension and BPH, who presents for evaluation of urinary retention. He reports that around 9 PM he had the urge to urinate, however when he tried to use the bathroom nothing came out. Since that time he has been unable to void. Associated symptoms include suprapubic pain and lower abdominal distension. The pain does not radiate into his back. He does note over the last few days he has had worsening nocturia. No hematuria or F/C. The patient does have a history of BPH and reports a similar episode of urinary retention four years ago for which he needed a temporary littlejohn catheter. He has not had any issues since that time.  used: [employer]  [place] [facility] (#[id number])      Prior to Admission Medications   Prescriptions Last Dose Informant Patient Reported? Taking?    Diclofenac Sodium (VOLTAREN) 1 %   No No   Sig: Apply 2 g topically 4 (four) times a day   amLODIPine (NORVASC) 10 mg tablet   No No   Sig: Take 1 tablet (10 mg total) by mouth daily   atorvastatin (LIPITOR) 40 mg tablet   No No   Sig: TAKE 1 TABLET BY MOUTH EVERY DAY   lisinopril (ZESTRIL) 40 mg tablet   No No   Sig: TAKE 1 TABLET BY MOUTH EVERY DAY   methocarbamol (ROBAXIN) 750 mg tablet   No No   Sig: Take 1 tablet (750 mg total) by mouth 3 (three) times a day as needed for muscle spasms   tamsulosin (FLOMAX) 0.4 mg   No No   Sig: Take 1 capsule (0.4 mg total) by mouth daily with dinner      Facility-Administered Medications: None       Past Medical History:   Diagnosis Date   • Enlarged prostate    • Hypertension        Past Surgical History:   Procedure Laterality Date   • BREAST MASS EXCISION Right 1994   • NOSE SURGERY     • SHOULDER SURGERY      left shoulder    • WISDOM TOOTH EXTRACTION         Family History   Problem Relation Age of Onset   • No Known Problems Mother    • No Known Problems Father      I have reviewed and agree with the history as documented. E-Cigarette/Vaping   • E-Cigarette Use Never User      E-Cigarette/Vaping Substances     Social History     Tobacco Use   • Smoking status: Never   • Smokeless tobacco: Never   Vaping Use   • Vaping Use: Never used   Substance Use Topics   • Alcohol use: Not Currently     Comment: occasional    • Drug use: Never       Review of Systems   Constitutional: Negative for chills and fever. HENT: Negative for ear pain and sore throat. Eyes: Negative for pain and visual disturbance. Respiratory: Negative for cough and shortness of breath. Cardiovascular: Negative for chest pain and palpitations. Gastrointestinal: Positive for abdominal distention and abdominal pain. Negative for nausea and vomiting. Genitourinary: Positive for decreased urine volume, difficulty urinating and frequency. Negative for dysuria and hematuria.        + Nocturia   Musculoskeletal: Negative for arthralgias, back pain and myalgias. Skin: Negative for color change and rash. Neurological: Negative for seizures and syncope. All other systems reviewed and are negative. Physical Exam  Physical Exam  Vitals and nursing note reviewed. Constitutional:       General: He is awake. Appearance: He is well-developed and normal weight. He is not toxic-appearing or diaphoretic. Comments: Patient is clearly uncomfortable lying on the stretcher. HENT:      Head: Normocephalic and atraumatic. Right Ear: External ear normal.      Left Ear: External ear normal.      Nose: Nose normal.      Mouth/Throat:      Lips: Pink. Mouth: Mucous membranes are moist.      Pharynx: Oropharynx is clear. Uvula midline.    Eyes:      General: Lids are normal. Vision grossly intact. Gaze aligned appropriately. No scleral icterus. Conjunctiva/sclera: Conjunctivae normal.      Pupils: Pupils are equal, round, and reactive to light. Cardiovascular:      Rate and Rhythm: Regular rhythm. Tachycardia present. Heart sounds: S1 normal and S2 normal. No murmur heard. No friction rub. No gallop. Pulmonary:      Effort: Pulmonary effort is normal. No respiratory distress. Breath sounds: Normal breath sounds and air entry. No wheezing, rhonchi or rales. Abdominal:      General: There is distension. Tenderness: There is abdominal tenderness in the suprapubic area. There is guarding. There is no rebound. Musculoskeletal:      Cervical back: Normal, full passive range of motion without pain and neck supple. No tenderness or bony tenderness. Thoracic back: Normal. No tenderness or bony tenderness. Lumbar back: Normal. No tenderness or bony tenderness. Skin:     General: Skin is warm. Capillary Refill: Capillary refill takes less than 2 seconds. Coloration: Skin is not jaundiced or pale. Findings: No rash. Neurological:      Mental Status: He is alert and oriented to person, place, and time. Psychiatric:         Behavior: Behavior is cooperative.          Vital Signs  ED Triage Vitals   Temperature Pulse Respirations Blood Pressure SpO2   08/24/23 0143 08/24/23 0143 08/24/23 0143 08/24/23 0143 08/24/23 0143   98.2 °F (36.8 °C) 104 20 (!) 178/103 100 %      Temp Source Heart Rate Source Patient Position - Orthostatic VS BP Location FiO2 (%)   08/24/23 0143 08/24/23 0143 08/24/23 0143 08/24/23 0143 --   Tympanic Monitor Lying Left arm       Pain Score       08/24/23 0213       2           Vitals:    08/24/23 0143 08/24/23 0328   BP: (!) 178/103 (!) 165/106   Pulse: 104 84   Patient Position - Orthostatic VS: Lying Lying       ED Medications  Medications   sodium chloride 0.9 % bolus 1,000 mL (0 mL Intravenous Stopped 8/24/23 0354)   ketorolac (TORADOL) injection 15 mg (15 mg Intravenous Given 8/24/23 0213)   iohexol (OMNIPAQUE) 350 MG/ML injection (SINGLE-DOSE) 85 mL (85 mL Intravenous Given 8/24/23 0317)       Diagnostic Studies  Results Reviewed     Procedure Component Value Units Date/Time    Comprehensive metabolic panel [[id number]] Collected: 08/24/23 0213    Lab Status: Final result Specimen: Blood from Line, Venous Updated: 08/24/23 0241     Sodium 141 mmol/L      Potassium 3.6 mmol/L      Chloride 103 mmol/L      CO2 28 mmol/L      ANION GAP 10 mmol/L      BUN 19 mg/dL      Creatinine 1.04 mg/dL      Glucose 100 mg/dL      Calcium 9.2 mg/dL      AST 17 U/L      ALT 15 U/L      Alkaline Phosphatase 71 U/L      Total Protein 7.6 g/dL      Albumin 4.1 g/dL      Total Bilirubin 0.25 mg/dL      eGFR 81 ml/min/1.73sq m     Narrative:      [place] guidelines for Chronic Kidney Disease (CKD):   •  Stage 1 with normal or high GFR (GFR > 90 mL/min/1.73 square meters)  •  Stage 2 Mild CKD (GFR = 60-89 mL/min/1.73 square meters)  •  Stage 3A Moderate CKD (GFR = 45-59 mL/min/1.73 square meters)  •  Stage 3B Moderate CKD (GFR = 30-44 mL/min/1.73 square meters)  •  Stage 4 Severe CKD (GFR = 15-29 mL/min/1.73 square meters)  •  Stage 5 End Stage CKD (GFR <15 mL/min/1.73 square meters)  Note: GFR calculation is accurate only with a steady state creatinine    Urine Microscopic [[id number]]  (Abnormal) Collected: 08/24/23 0213    Lab Status: Final result Specimen: Urine, Clean Catch Updated: 08/24/23 0227     RBC, UA 4-10 /hpf      WBC, UA 0-1 /hpf      Epithelial Cells None Seen /hpf      Bacteria, UA None Seen /hpf     CBC and differential [[id number]] Collected: 08/24/23 0213    Lab Status: Final result Specimen: Blood from Line, Venous Updated: 08/24/23 0224     WBC 6.34 Thousand/uL      RBC 4.42 Million/uL      Hemoglobin 12.9 g/dL      Hematocrit 37.9 %      MCV 86 fL      MCH 29.2 pg      MCHC 34.0 g/dL RDW 13.0 %      MPV 9.7 fL      Platelets 457 Thousands/uL      nRBC 0 /100 WBCs      Neutrophils Relative 63 %      Immat GRANS % 0 %      Lymphocytes Relative 27 %      Monocytes Relative 6 %      Eosinophils Relative 3 %      Basophils Relative 1 %      Neutrophils Absolute 4.00 Thousands/µL      Immature Grans Absolute 0.01 Thousand/uL      Lymphocytes Absolute 1.71 Thousands/µL      Monocytes Absolute 0.39 Thousand/µL      Eosinophils Absolute 0.16 Thousand/µL      Basophils Absolute 0.07 Thousands/µL     UA w Reflex to Microscopic w Reflex to Culture [[id number]]  (Abnormal) Collected: 08/24/23 0213    Lab Status: Final result Specimen: Urine, Clean Catch Updated: 08/24/23 0223     Color, UA Straw     Clarity, UA Clear     Specific Gravity, UA 1.010     pH, UA 6.0     Leukocytes, UA Negative     Nitrite, UA Negative     Protein, UA 15 (Trace) mg/dl      Glucose, UA Negative mg/dl      Ketones, UA Negative mg/dl      Bilirubin, UA Negative     Occult Blood, .0     UROBILINOGEN UA Negative mg/dL                  CT abdomen pelvis with contrast   Final Result by Kenneth Hamilton MD (08/24 9470)      Mild fullness of the bilateral renal collecting systems as well as a markedly enlarged prostate invaginating into the bladder and diffuse bladder wall thickening suggest chronic urinary retention            Workstation performed: [id number]                    Procedures  Procedures         ED Course  ED Course as of 08/24/23 0403   Thu Aug 24, 2023   0246 CBC and differential  WNL   0246 Comprehensive metabolic panel  WNL   6200 RBC, UA(!): 4-10  Likely traumatic due to difficult littlejohn catheter insertion. 0246 WBC, UA: 0-1   0246 Epithelial Cells: None Seen   0246 Bacteria, UA: None Seen   0341 IMPRESSION:  Mild fullness of the bilateral renal collecting systems as well as a markedly enlarged prostate invaginating into the bladder and diffuse bladder wall thickening suggest chronic urinary retention.        SBIRT 20yo+    Flowsheet Row Most Recent Value   Initial Alcohol Screen: US AUDIT-C     1. How often do you have a drink containing alcohol? 0 Filed at: 08/24/2023 0140   2. How many drinks containing alcohol do you have on a typical day you are drinking? 0 Filed at: 08/24/2023 0140   3a. Male UNDER 65: How often do you have five or more drinks on one occasion? 0 Filed at: 08/24/2023 0140   3b. FEMALE Any Age, or MALE 65+: How often do you have 4 or more drinks on one occassion? 0 Filed at: 08/24/2023 0140   Audit-C Score 0 Filed at: 08/24/2023 0140   DENICE: How many times in the past year have you. .. Used an illegal drug or used a prescription medication for non-medical reasons? Never Filed at: 08/24/2023 0140            Medical Decision Making  Patient presents for evaluation of urinary retention and suprapubic abdominal pain. Differential diagnosis includes but is not limited to urinary retention, BPH, hematuria, UTI, or tumor; doubt neurologic etiology or YOGESH. Bladder scan on arrival showed over 900 cc of urine. A littlejohn catheter was inserted and there was an output over 1000 cc. Post-void residual was 0. Urine microscopic showed 4-10 RBCs/hpf, but no evidence of a UTI. Labs unremarkable. CT of the abdomen and pelvis showed a markedly enlarged prostate invaginating into the bladder. Referral for urology placed. Patient will need to keep catheter inserted until he sees urology. Littlejohn bag was swapped for a leg bag for patient comfort. Reviewed all results with the patient and his family, and all questions were answered. Strict return precautions discussed and he verbalized understanding. Follow-up with urology, and return to the ED in the interim with new or worsening symptoms. Acute urinary retention: acute illness or injury  Benign prostatic hyperplasia with lower urinary tract symptoms, symptom details unspecified: acute illness or injury  Amount and/or Complexity of Data Reviewed  Labs: ordered. Decision-making details documented in ED Course. Radiology: ordered. Risk  Prescription drug management. Disposition  Final diagnoses:   Acute urinary retention   Benign prostatic hyperplasia with lower urinary tract symptoms, symptom details unspecified     Time reflects when diagnosis was documented in both MDM as applicable and the Disposition within this note     Time User Action Codes Description Comment    8/24/2023  2:14 AM [place] Add [R33.8] Acute urinary retention     8/24/2023  3:45 AM [place] Add [N40.1] Benign prostatic hyperplasia with lower urinary tract symptoms, symptom details unspecified       ED Disposition     ED Disposition   Discharge    Condition   Stable    Date/Time   Thu Aug 24, 2023  3:45 AM    Comment   Misael Iveth discharge to home/self care.                Follow-up Information     Follow up With Specialties Details Why Contact Info Additional Shalom, [address] [place], Nurse Practitioner   [address]  600 [facility] 128 [facility] [address] [place] [address]  Urology   [address] [address] [address]  [facility] [address] [place] [address] , 1201 [facility], [address] [place], [place], [address] For Urology [facility]  [address]  [facility] [address]  [facility], [address] [place] [address], [address] [place] [address] Road,[address] Floor, [address] [phone]          Discharge Medication List as of 8/24/2023  3:45 AM      CONTINUE these medications which have NOT CHANGED    Details   amLODIPine (NORVASC) 10 mg tablet Take 1 tablet (10 mg total) by mouth daily, Starting Mon 6/7/2021, Normal      atorvastatin (LIPITOR) 40 mg tablet TAKE 1 TABLET BY MOUTH EVERY DAY, Normal      Diclofenac Sodium (VOLTAREN) 1 % Apply 2 g topically 4 (four) times a day, Starting Mon 3/29/2021, Normal      lisinopril (ZESTRIL) 40 mg tablet TAKE 1 TABLET BY MOUTH EVERY DAY, Normal      methocarbamol (ROBAXIN) 750 mg tablet Take 1 tablet (750 mg total) by mouth 3 (three) times a day as needed for muscle spasms, Starting Mon 6/7/2021, Normal      tamsulosin (FLOMAX) 0.4 mg Take 1 capsule (0.4 mg total) by mouth daily with dinner, Starting Mon 6/7/2021, Normal                 PDMP Review     None          ED Provider  Electronically Signed by           Dayanara Ordonez PA-C  08/24/23 7962

## 2023-09-01 ENCOUNTER — TELEPHONE (OUTPATIENT)
Age: 53
End: 2023-09-01

## 2023-09-01 NOTE — TELEPHONE ENCOUNTER
New Patient    What is the reason for the patient’s appointment?:  ER FU   What office location does the patient prefer?:Any [place] or [place]    Does patient have Imaging/Lab Results:yes Epic    Have patient records been requested?:  If No, are the records showing in Epic:Epic       INSURANCE:   Do we accept the patient's insurance or is the patient Self-Pay?:  yes  Insurance Provider: Curtis Ledesma  Plan Type/Number:   Member ID#: [id number]      HISTORY:   Has the patient had any previous Urologist(s)? :yes    Was the patient seen in the ED?: Yes [facility]     Has the patient had any outside testing done?:  no  Does the patient have a personal history of cancer?:  no      Pt under care of    Last Seen:    Pt calling due to:    Pt Symptoms are:     Pt can be reached at:

## 2023-09-06 NOTE — TELEPHONE ENCOUNTER
Called the patient using the Language Line Roby [place] # [phone] ) Patient did not answer, A follow-up will be set for 3 days.

## 2023-09-12 NOTE — TELEPHONE ENCOUNTER
Called the patient using the Language Line (Kojo# [phone]) Patient did not answer, no VM . A follow-up will be set for 3 days.

## 2023-09-20 ENCOUNTER — HOSPITAL ENCOUNTER (EMERGENCY)
Facility: HOSPITAL | Age: 53
Discharge: HOME | End: 2023-09-20
Attending: EMERGENCY MEDICINE
Payer: COMMERCIAL

## 2023-09-20 VITALS
DIASTOLIC BLOOD PRESSURE: 105 MMHG | TEMPERATURE: 98.5 F | HEART RATE: 98 BPM | SYSTOLIC BLOOD PRESSURE: 185 MMHG | RESPIRATION RATE: 16 BRPM | OXYGEN SATURATION: 98 % | WEIGHT: 139.1 LBS | BODY MASS INDEX: 23.88 KG/M2

## 2023-09-20 DIAGNOSIS — T83.9XXA FOLEY CATHETER PROBLEM (HCC): Primary | ICD-10-CM

## 2023-09-20 PROCEDURE — 99283 EMERGENCY DEPT VISIT LOW MDM: CPT

## 2023-09-20 PROCEDURE — 99283 EMERGENCY DEPT VISIT LOW MDM: CPT | Performed by: EMERGENCY MEDICINE

## 2023-09-20 NOTE — ED PROVIDER NOTES
History  Chief Complaint   Patient presents with   • Medical Problem     Pt states he needs a new leg bag for his catheter, states it is leaking      HPI  79-year-old male presenting with urinary catheter issue. Patient has a Granda catheter in place and has urology follow-up due to urinary retention. States that for the past day the bag started leaking and is requesting for replacement bag. Patient has no symptoms. Prior to Admission Medications   Prescriptions Last Dose Informant Patient Reported? Taking? Diclofenac Sodium (VOLTAREN) 1 %   No No   Sig: Apply 2 g topically 4 (four) times a day   amLODIPine (NORVASC) 10 mg tablet   No No   Sig: Take 1 tablet (10 mg total) by mouth daily   atorvastatin (LIPITOR) 40 mg tablet   No No   Sig: TAKE 1 TABLET BY MOUTH EVERY DAY   lisinopril (ZESTRIL) 40 mg tablet   No No   Sig: TAKE 1 TABLET BY MOUTH EVERY DAY   methocarbamol (ROBAXIN) 750 mg tablet   No No   Sig: Take 1 tablet (750 mg total) by mouth 3 (three) times a day as needed for muscle spasms   tamsulosin (FLOMAX) 0.4 mg   No No   Sig: Take 1 capsule (0.4 mg total) by mouth daily with dinner      Facility-Administered Medications: None       Past Medical History:   Diagnosis Date   • Enlarged prostate    • Hypertension        Past Surgical History:   Procedure Laterality Date   • BREAST MASS EXCISION Right 1994   • NOSE SURGERY     • SHOULDER SURGERY      left shoulder    • WISDOM TOOTH EXTRACTION         Family History   Problem Relation Age of Onset   • No Known Problems Mother    • No Known Problems Father      I have reviewed and agree with the history as documented.     E-Cigarette/Vaping   • E-Cigarette Use Never User      E-Cigarette/Vaping Substances     Social History     Tobacco Use   • Smoking status: Never   • Smokeless tobacco: Never   Vaping Use   • Vaping Use: Never used   Substance Use Topics   • Alcohol use: Not Currently     Comment: occasional    • Drug use: Never       Review of Systems Constitutional: Negative for chills, diaphoresis, fever and unexpected weight change. HENT: Negative for ear pain and sore throat. Eyes: Negative for visual disturbance. Respiratory: Negative for cough, chest tightness and shortness of breath. Cardiovascular: Negative for chest pain and leg swelling. Gastrointestinal: Negative for abdominal distention, abdominal pain, constipation, diarrhea, nausea and vomiting. Endocrine: Negative. Genitourinary: Negative for difficulty urinating and dysuria. Musculoskeletal: Negative. Skin: Negative. Allergic/Immunologic: Negative. Neurological: Negative. Hematological: Negative. Psychiatric/Behavioral: Negative. All other systems reviewed and are negative. Physical Exam  Physical Exam  Vitals and nursing note reviewed. Constitutional:       General: He is not in acute distress. Appearance: Normal appearance. He is not ill-appearing. HENT:      Head: Normocephalic and atraumatic. Right Ear: External ear normal.      Left Ear: External ear normal.      Nose: Nose normal.      Mouth/Throat:      Mouth: Mucous membranes are moist.      Pharynx: Oropharynx is clear. Eyes:      General: No scleral icterus. Right eye: No discharge. Left eye: No discharge. Extraocular Movements: Extraocular movements intact. Conjunctiva/sclera: Conjunctivae normal.      Pupils: Pupils are equal, round, and reactive to light. Cardiovascular:      Rate and Rhythm: Normal rate and regular rhythm. Pulses: Normal pulses. Heart sounds: Normal heart sounds. Pulmonary:      Effort: Pulmonary effort is normal.      Breath sounds: Normal breath sounds. Abdominal:      General: Abdomen is flat. Bowel sounds are normal. There is no distension. Palpations: Abdomen is soft. Tenderness: There is no abdominal tenderness. There is no guarding or rebound.    Genitourinary:     Comments: Granda catheter in place with a urine bag leaking  Musculoskeletal:         General: Normal range of motion. Cervical back: Normal range of motion and neck supple. Skin:     General: Skin is warm and dry. Capillary Refill: Capillary refill takes less than 2 seconds. Neurological:      General: No focal deficit present. Mental Status: He is alert and oriented to person, place, and time. Mental status is at baseline. Psychiatric:         Mood and Affect: Mood normal.         Behavior: Behavior normal.         Thought Content: Thought content normal.         Judgment: Judgment normal.         Vital Signs  ED Triage Vitals [09/20/23 1655]   Temperature Pulse Respirations Blood Pressure SpO2   98.5 °F (36.9 °C) 98 16 (!) 185/105 98 %      Temp Source Heart Rate Source Patient Position - Orthostatic VS BP Location FiO2 (%)   Tympanic Monitor Sitting Left arm --      Pain Score       --           Vitals:    09/20/23 1655   BP: (!) 185/105   Pulse: 98   Patient Position - Orthostatic VS: Sitting         Visual Acuity      ED Medications  Medications - No data to display    Diagnostic Studies  Results Reviewed     None                 No orders to display              Procedures  Procedures         ED Course                                             Medical Decision Making  Patient is a 18-year-old male presenting with Granda catheter problem. Patient states that the back started to leak  Is requesting an exchange of the bag  Patient reports no symptoms including dysuria or hematuria  Patient also has no lower abdominal pain or flank pain  Urinary bag was exchanged patient ready for discharge    Granda catheter problem [facility]): acute illness or injury      Disposition  Final diagnoses: Granda catheter problem [facility])     Time reflects when diagnosis was documented in both MDM as applicable and the Disposition within this note     Time User Action Codes Description Comment    9/20/2023  4:56 PM Nadine, [address] [place] [address]. 9XXA] Granda catheter problem [facility])       ED Disposition     ED Disposition   Discharge    Condition   Stable    Date/Time   Wed Sep 20, 2023  4:55 PM    [address] [place] [address] discharge to home/self care. Follow-up Information     Follow up With Specialties Details Why Contact Info Additional [facility] Urology Schedule an appointment as soon as possible for a visit   [address] [address] [address]  [facility], [address] [facility], [address] [place], [place], [address]          Discharge Medication List as of 9/20/2023  4:56 PM      CONTINUE these medications which have NOT CHANGED    Details   amLODIPine (NORVASC) 10 mg tablet Take 1 tablet (10 mg total) by mouth daily, Starting Mon 6/7/2021, Normal      atorvastatin (LIPITOR) 40 mg tablet TAKE 1 TABLET BY MOUTH EVERY DAY, Normal      Diclofenac Sodium (VOLTAREN) 1 % Apply 2 g topically 4 (four) times a day, Starting Mon 3/29/2021, Normal      lisinopril (ZESTRIL) 40 mg tablet TAKE 1 TABLET BY MOUTH EVERY DAY, Normal      methocarbamol (ROBAXIN) 750 mg tablet Take 1 tablet (750 mg total) by mouth 3 (three) times a day as needed for muscle spasms, Starting Mon 6/7/2021, Normal      tamsulosin (FLOMAX) 0.4 mg Take 1 capsule (0.4 mg total) by mouth daily with dinner, Starting Mon 6/7/2021, Normal             No discharge procedures on file.     PDMP Review     None          ED Provider  Electronically Signed by           Debora Eli MD  09/21/23 3917

## 2023-09-20 NOTE — ED NOTES
Pt provided with new leg bag. Discharge instructions in hand. Denies any further needs at this time.       Morse Boast, RN  09/20/23 1384

## 2024-02-01 PROBLEM — N13.8 BPH WITH OBSTRUCTION/LOWER URINARY TRACT SYMPTOMS: Status: ACTIVE | Noted: 2021-03-29

## 2024-02-01 PROBLEM — T24.231A PARTIAL THICKNESS BURN OF RIGHT LOWER LEG: Status: ACTIVE | Noted: 2021-10-13

## 2024-02-01 RX ORDER — DOXAZOSIN 2 MG/1
2 TABLET ORAL
COMMUNITY
Start: 2023-11-28 | End: 2024-11-27

## 2024-02-02 ENCOUNTER — OFFICE VISIT (OUTPATIENT)
Dept: FAMILY MEDICINE CLINIC | Facility: CLINIC | Age: 54
End: 2024-02-02

## 2024-02-02 VITALS
TEMPERATURE: 98 F | SYSTOLIC BLOOD PRESSURE: 108 MMHG | HEIGHT: 64 IN | HEART RATE: 108 BPM | RESPIRATION RATE: 16 BRPM | OXYGEN SATURATION: 97 % | BODY MASS INDEX: 23.73 KG/M2 | DIASTOLIC BLOOD PRESSURE: 80 MMHG | WEIGHT: 139 LBS

## 2024-02-02 DIAGNOSIS — R00.0 TACHYCARDIA: ICD-10-CM

## 2024-02-02 DIAGNOSIS — I10 PRIMARY HYPERTENSION: Primary | ICD-10-CM

## 2024-02-02 DIAGNOSIS — Z11.59 NEED FOR HEPATITIS C SCREENING TEST: ICD-10-CM

## 2024-02-02 DIAGNOSIS — E78.2 MIXED HYPERLIPIDEMIA: ICD-10-CM

## 2024-02-02 DIAGNOSIS — R63.4 UNINTENTIONAL WEIGHT LOSS: ICD-10-CM

## 2024-02-02 DIAGNOSIS — Z12.11 SCREENING FOR COLON CANCER: ICD-10-CM

## 2024-02-02 PROBLEM — T24.231A PARTIAL THICKNESS BURN OF RIGHT LOWER LEG: Status: RESOLVED | Noted: 2021-10-13 | Resolved: 2024-02-02

## 2024-02-02 PROCEDURE — 3079F DIAST BP 80-89 MM HG: CPT | Performed by: FAMILY MEDICINE

## 2024-02-02 PROCEDURE — 99213 OFFICE O/P EST LOW 20 MIN: CPT | Performed by: FAMILY MEDICINE

## 2024-02-02 PROCEDURE — 3074F SYST BP LT 130 MM HG: CPT | Performed by: FAMILY MEDICINE

## 2024-02-02 RX ORDER — ATORVASTATIN CALCIUM 40 MG/1
40 TABLET, FILM COATED ORAL DAILY
Qty: 90 TABLET | Refills: 1 | Status: SHIPPED | OUTPATIENT
Start: 2024-02-02

## 2024-02-02 NOTE — ASSESSMENT & PLAN NOTE
Patient currently well-controlled.  Taking only Cardura as he has run out of the lisinopril and amlodipine.  Patient was taking tamsulosin but was orthostatic.  Unclear if patient's hypertensive episodes were only occurring when he had urinary retention, it appears that to be the majority of times he sought care.  - Patient will purchase blood pressure cuff  - Patient will follow-up in 1 month with log of home BPs  - Continue Cardura, discontinue lisinopril, and amlodipine for now

## 2024-02-02 NOTE — PROGRESS NOTES
Name: Roby Louis      : 1970      MRN: [id number]  Encounter Provider: Elias Schoen, MD  Encounter Date: 2024   Encounter department: [facility]    Assessment & Plan     1. Primary hypertension  Assessment & Plan:  Patient currently well-controlled.  Taking only Cardura as he has run out of the lisinopril and amlodipine.  Patient was taking tamsulosin but was orthostatic.  Unclear if patient's hypertensive episodes were only occurring when he had urinary retention, it appears that to be the majority of times he sought care.  - Patient will purchase blood pressure cuff  - Patient will follow-up in 1 month with log of home BPs  - Continue Cardura, discontinue lisinopril, and amlodipine for now      2. Unintentional weight loss  Assessment & Plan:  10 pound weight loss in 1 year.  Patient states unintentional, unsure what it is due to.  He has never had colon cancer screening, no family history.  Patient denies fevers and chills at baseline.  No new sexual contacts.  - Testing as below, follow-up in 1 month    Orders:  -     CBC and differential; Future  -     Comprehensive metabolic panel; Future  -     TSH w/Reflex; Future  -     Hepatitis C Antibody; Future  -     Hemoglobin A1C; Future    3. Tachycardia  Assessment & Plan:  Likely in the setting of current viral infection.  Versus compensation from being on alpha-blocker.  Versus thyroid abnormality (checking at today's visit).  - Continue to monitor at future visits, will consider EKG at next visit if persistent      4. Screening for colon cancer  -     Cologuard    5. Mixed hyperlipidemia  -     atorvastatin (LIPITOR) 40 mg tablet; Take 1 tablet (40 mg total) by mouth daily  -     Comprehensive metabolic panel; Future    6. Need for hepatitis C screening test  -     Hepatitis C Antibody; Future           Subjective      Patient is a 53-year-old male here for follow-up hypertension    Patient reports he has been  out of his lisinopril and amlodipine for months now.  He has been taking his Cardura daily for urinary retention, he was unaware that this is also a blood pressure medication.  In the past he was on tamsulosin but had orthostasis and discontinued due to the side effects.  He notes some symptoms of orthostasis on the Cardura but much improved.    Patient has not been taking his blood pressure at home, only taking it when he is in the ED or in another medical setting.  He denies headaches vision changes or other signs of hypertension.    He is feeling ill the last 2 days, with fever chills, loss of appetite.  He notes his daughter has been sick as well with viral symptoms.    Patient has never had colon cancer screening.  He denies any family history of colon cancer, he denies blood in his stool.  He has never smoked.  He has been evaluated for prostate cancer but urology does not believe that that is what is going on with his prostate and the urinary retention.  Patient denies a cough or other respiratory symptoms.  He has had a 10 pound weight loss that is unexplained.  He thought may be related to his urinary retention but is unsure.  He does not report any other associated symptoms.      Review of Systems   Constitutional:  Positive for fever and unexpected weight change. Negative for appetite change and fatigue.   Respiratory:  Negative for cough and shortness of breath.    Gastrointestinal:  Negative for abdominal pain, constipation and diarrhea.   Endocrine: Negative for polydipsia, polyphagia and polyuria.   Genitourinary:  Negative for dysuria.   Skin:  Negative for rash.   Neurological:  Negative for dizziness.       Current Outpatient Medications on File Prior to Visit   Medication Sig    doxazosin (CARDURA) 2 mg tablet Take 2 mg by mouth    [DISCONTINUED] amLODIPine (NORVASC) 10 mg tablet Take 1 tablet (10 mg total) by mouth daily    [DISCONTINUED] atorvastatin (LIPITOR) 40 mg tablet TAKE 1 TABLET BY MOUTH  "EVERY DAY    [DISCONTINUED] Diclofenac Sodium (VOLTAREN) 1 % Apply 2 g topically 4 (four) times a day    [DISCONTINUED] lisinopril (ZESTRIL) 40 mg tablet TAKE 1 TABLET BY MOUTH EVERY DAY    [DISCONTINUED] methocarbamol (ROBAXIN) 750 mg tablet Take 1 tablet (750 mg total) by mouth 3 (three) times a day as needed for muscle spasms    [DISCONTINUED] tamsulosin (FLOMAX) 0.4 mg Take 1 capsule (0.4 mg total) by mouth daily with dinner       Objective     /80 (BP Location: Right arm)   Pulse (!) 108   Temp 98 °F (36.7 °C) (Temporal)   Resp 16   Ht 5' 4\" (1.626 m)   Wt 63 kg (139 lb)   SpO2 97%   BMI 23.86 kg/m²     Physical Exam  Vitals reviewed.   Constitutional:       Appearance: Normal appearance.   HENT:      Head: Normocephalic.      Nose: Nose normal.   Eyes:      Extraocular Movements: Extraocular movements intact.      Pupils: Pupils are equal, round, and reactive to light.   Cardiovascular:      Rate and Rhythm: Regular rhythm. Tachycardia present.      Heart sounds: Normal heart sounds. No murmur heard.  Pulmonary:      Effort: Pulmonary effort is normal. No respiratory distress.      Breath sounds: Normal breath sounds.   Musculoskeletal:         General: Normal range of motion.      Cervical back: Normal range of motion.   Skin:     General: Skin is warm.   Neurological:      General: No focal deficit present.      Mental Status: He is alert and oriented to person, place, and time.   Psychiatric:         Mood and Affect: Mood normal.       Portions of the record were created with voice recognition software.  Occasional wrong word or \"sound a like\" substitutions may have occurred due to the inherent limitations of voice recognition software.  Read the chart carefully and recognize, using context, where substitutions have occurred.      Elias Schoen, MD    "

## 2024-02-02 NOTE — ASSESSMENT & PLAN NOTE
10 pound weight loss in 1 year.  Patient states unintentional, unsure what it is due to.  He has never had colon cancer screening, no family history.  Patient denies fevers and chills at baseline.  No new sexual contacts.  - Testing as below, follow-up in 1 month

## 2024-02-02 NOTE — ASSESSMENT & PLAN NOTE
Likely in the setting of current viral infection.  Versus compensation from being on alpha-blocker.  Versus thyroid abnormality (checking at today's visit).  - Continue to monitor at future visits, will consider EKG at next visit if persistent
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
## 2024-04-12 ENCOUNTER — OFFICE VISIT (OUTPATIENT)
Dept: UROLOGY | Facility: CLINIC | Age: 54
End: 2024-04-12
Payer: COMMERCIAL

## 2024-04-12 VITALS
HEART RATE: 82 BPM | OXYGEN SATURATION: 98 % | HEIGHT: 64 IN | DIASTOLIC BLOOD PRESSURE: 82 MMHG | WEIGHT: 137 LBS | SYSTOLIC BLOOD PRESSURE: 152 MMHG | BODY MASS INDEX: 23.39 KG/M2

## 2024-04-12 DIAGNOSIS — N40.1 BPH WITH OBSTRUCTION/LOWER URINARY TRACT SYMPTOMS: ICD-10-CM

## 2024-04-12 DIAGNOSIS — N13.8 BPH WITH OBSTRUCTION/LOWER URINARY TRACT SYMPTOMS: ICD-10-CM

## 2024-04-12 DIAGNOSIS — R33.8 ACUTE URINARY RETENTION: Primary | ICD-10-CM

## 2024-04-12 PROBLEM — R97.20 ELEVATED PSA: Status: ACTIVE | Noted: 2024-04-12

## 2024-04-12 PROCEDURE — 51702 INSERT TEMP BLADDER CATH: CPT

## 2024-04-12 RX ORDER — FINASTERIDE 5 MG/1
5 TABLET, FILM COATED ORAL DAILY
Qty: 90 TABLET | Refills: 3 | Status: SHIPPED | OUTPATIENT
Start: 2024-04-12 | End: 2025-04-07

## 2024-04-12 NOTE — PROGRESS NOTES
"Universal Protocol:  Procedure performed by: (Suzette LEAVITT)  Consent: Verbal consent obtained.  Risks and benefits: risks, benefits and alternatives were discussed  Consent given by: patient  Time out: Immediately prior to procedure a \"time out\" was called to verify the correct patient, procedure, equipment, support staff and site/side marked as required.  Patient understanding: patient states understanding of the procedure being performed  Patient consent: the patient's understanding of the procedure matches consent given  Patient identity confirmed: verbally with patient    Bladder catheterization    Date/Time: 4/12/2024 2:15 PM    Performed by: Rosi Bello RN  Authorized by: Keegan Herzog MD    Patient location:  Bedside  Consent:     Consent given by:  Patient  Universal protocol:     Procedure explained and questions answered to patient or proxy's satisfaction: yes      Immediately prior to procedure a time out was called: yes      Patient identity confirmed:  Verbally with patient  Pre-procedure details:     Procedure purpose:  Therapeutic    Preparation: Patient was prepped and draped in usual sterile fashion    Anesthesia (see MAR for exact dosages):     Anesthesia method:  None  Procedure details:     Bladder irrigation: no      Catheter insertion:  Indwelling    Approach: natural orifice      Catheter type:  Granda    Catheter size:  16 Fr    Successful placement: yes      Urine characteristics:  Yellow and clear  Post-procedure details:     Patient tolerance of procedure:  Tolerated well, no immediate complications  Comments:      10 mL of water for inflation of balloon. Granda flushed with minimal urine return, upon standing noted yellow clear urine in leg-bag. Leg-bag attached to right leg via stat lock.      "

## 2024-05-14 ENCOUNTER — HOSPITAL ENCOUNTER (EMERGENCY)
Facility: HOSPITAL | Age: 54
Discharge: HOME | End: 2024-05-14
Attending: EMERGENCY MEDICINE
Payer: COMMERCIAL

## 2024-05-14 ENCOUNTER — PROCEDURE VISIT (OUTPATIENT)
Dept: UROLOGY | Facility: CLINIC | Age: 54
End: 2024-05-14
Payer: COMMERCIAL

## 2024-05-14 VITALS
WEIGHT: 140 LBS | DIASTOLIC BLOOD PRESSURE: 78 MMHG | HEART RATE: 89 BPM | HEIGHT: 64 IN | OXYGEN SATURATION: 98 % | SYSTOLIC BLOOD PRESSURE: 160 MMHG | BODY MASS INDEX: 23.9 KG/M2

## 2024-05-14 VITALS
SYSTOLIC BLOOD PRESSURE: 182 MMHG | DIASTOLIC BLOOD PRESSURE: 113 MMHG | TEMPERATURE: 98.7 F | OXYGEN SATURATION: 97 % | WEIGHT: 141.09 LBS | HEART RATE: 73 BPM | BODY MASS INDEX: 24.22 KG/M2 | RESPIRATION RATE: 18 BRPM

## 2024-05-14 DIAGNOSIS — R33.8 ACUTE URINARY RETENTION: Primary | ICD-10-CM

## 2024-05-14 DIAGNOSIS — R33.9 URINARY RETENTION: Primary | ICD-10-CM

## 2024-05-14 LAB — POST-VOID RESIDUAL VOLUME, ML POC: 63 ML

## 2024-05-14 PROCEDURE — 51798 US URINE CAPACITY MEASURE: CPT

## 2024-05-14 PROCEDURE — 99283 EMERGENCY DEPT VISIT LOW MDM: CPT

## 2024-05-14 PROCEDURE — 99211 OFF/OP EST MAY X REQ PHY/QHP: CPT

## 2024-05-14 PROCEDURE — 99284 EMERGENCY DEPT VISIT MOD MDM: CPT | Performed by: EMERGENCY MEDICINE
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
## 2024-05-14 NOTE — Clinical Note
Roby Louis was seen and treated in our emergency department on 5/14/2024.                Diagnosis:     Roby  .    He may return on this date: 05/16/2024         If you have any questions or concerns, please don't hesitate to call.      Edi Castillo MD    ______________________________           _______________          _______________  Hospital Representative                              Date                                Time

## 2024-05-14 NOTE — PROGRESS NOTES
"5/14/2024    Roby Fernando  1970  [id number]    Diagnosis  Chief Complaint    Urinary Retention         Patient presents for TOV managed by Dr. Herzog    Plan  Patient will follow up as scheduled in August for cysto and will call back sooner if any issues urinating     Procedure Granda removal/voiding trial    Granda catheter removed after deflation of an intact balloon. Patient tolerated well. Encouraged patient to hydrate well and return this afternoon for post void residual.  he knows he may return early if uncomfortable and unable to urinate. Patient agrees to this plan.    Patient returned this afternoon. Patient states able to void. Patient voided 125 ml while in office. Bladder ultrasound performed and PVR measured 63 ml.    Patient will follow up as scheduled in August for cysto and will call back sooner if any issues urinating   Recent Results (from the past 4 hour(s))   POCT Measure PVR    Collection Time: 05/14/24  2:51 PM   Result Value Ref Range    POST-VOID RESIDUAL VOLUME, ML POC 63 mL           Vitals:    05/14/24 1450   BP: 160/78   Pulse: 89   SpO2: 98%   Weight: 63.5 kg (140 lb)   Height: 5' 4\" (1.626 m)           Suzette Bergeron RN       "

## 2024-05-15 ENCOUNTER — TELEPHONE (OUTPATIENT)
Dept: UROLOGY | Facility: CLINIC | Age: 54
End: 2024-05-15

## 2024-05-15 NOTE — TELEPHONE ENCOUNTER
Pt walked in to office and informed me that he was seen at ED last night to have cath put back in. Pt had a TOV yesterday and was ok until about 5pm last night.     Pt requested to be scheduled for surgery and unsure If there is anything else that can be done to make him feel better. I went in to talk to nurses and they advised to have TOV in another month and then waitlist because pt needs cysto trus before surgery can be an option.     Cysto trus is for 8/19    Pt scheduled for nurse visit 6/12 as a cath change- he did not want to do the TOV again. He said he has had many of them and was in a lot of pain without the cath.     Placed on waitlist. Watch for cancellations.

## 2024-06-12 ENCOUNTER — PROCEDURE VISIT (OUTPATIENT)
Dept: UROLOGY | Facility: CLINIC | Age: 54
End: 2024-06-12
Payer: COMMERCIAL

## 2024-06-12 VITALS
OXYGEN SATURATION: 98 % | BODY MASS INDEX: 23.9 KG/M2 | HEIGHT: 64 IN | DIASTOLIC BLOOD PRESSURE: 76 MMHG | SYSTOLIC BLOOD PRESSURE: 170 MMHG | WEIGHT: 140 LBS | HEART RATE: 89 BPM

## 2024-06-12 DIAGNOSIS — R33.8 ACUTE URINARY RETENTION: Primary | ICD-10-CM

## 2024-06-12 PROCEDURE — 51702 INSERT TEMP BLADDER CATH: CPT

## 2024-06-12 NOTE — PROGRESS NOTES
"6/12/2024  Roby Louis is a 53 y.o. male  [id number]    Diagnosis:  Chief Complaint    Urinary Retention         Patient presents for routine littlejohn exchange managed by Dr. Herzog    Plan:  Patient will follow up as scheduled for next littlejohn exchange  Patient scheduled for cysto trus in August and is on the wait list for sooner appt if one becomes available  Patient instructed to call with any questions or concerns in the meantime.    No orders of the defined types were placed in this encounter.       Vitals:    06/12/24 1010   BP: 170/76   Pulse: 89   SpO2: 98%   Weight: 63.5 kg (140 lb)   Height: 5' 4\" (1.626 m)           Procedure:    Universal Protocol:  Consent: Verbal consent obtained.  Risks and benefits: risks, benefits and alternatives were discussed  Consent given by: patient  Patient understanding: patient states understanding of the procedure being performed  Patient consent: the patient's understanding of the procedure matches consent given  Patient identity confirmed: verbally with patient    Bladder catheterization    Date/Time: 6/12/2024 10:30 AM    Performed by: Suzette Bergeron RN  Authorized by: Ace Diaz DO    Patient location:  Bedside  Consent:     Consent given by:  Patient  Universal protocol:     Procedure explained and questions answered to patient or proxy's satisfaction: yes      Patient identity confirmed:  Verbally with patient  Pre-procedure details:     Procedure purpose:  Therapeutic  Procedure details:     Bladder irrigation: no      Catheter insertion:  Indwelling    Approach: natural orifice      Catheter type:  Latex and Littlejohn    Catheter size:  16 Fr    Number of attempts:  1    Successful placement: yes      Urine characteristics:  Yellow  Post-procedure details:     Patient tolerance of procedure:  Tolerated well, no immediate complications  Comments:        10 ml sterile water removed from previous balloon. Previous catheter removed without issue. Prepped with " betadine. New littlejohn inserted without issue. 10 ml inserted into balloon. Flushed with 10 ml sterile water for return. Attached to leg bag. Extra bags supplied               Suzette Bergeron RN

## 2024-07-23 ENCOUNTER — PROCEDURE VISIT (OUTPATIENT)
Dept: UROLOGY | Facility: CLINIC | Age: 54
End: 2024-07-23
Payer: COMMERCIAL

## 2024-07-23 VITALS
DIASTOLIC BLOOD PRESSURE: 78 MMHG | BODY MASS INDEX: 23.9 KG/M2 | SYSTOLIC BLOOD PRESSURE: 156 MMHG | WEIGHT: 140 LBS | HEIGHT: 64 IN

## 2024-07-23 DIAGNOSIS — N13.8 BPH WITH OBSTRUCTION/LOWER URINARY TRACT SYMPTOMS: ICD-10-CM

## 2024-07-23 DIAGNOSIS — N40.1 BPH WITH OBSTRUCTION/LOWER URINARY TRACT SYMPTOMS: ICD-10-CM

## 2024-07-23 DIAGNOSIS — R33.8 ACUTE URINARY RETENTION: Primary | ICD-10-CM

## 2024-07-23 LAB — POST-VOID RESIDUAL VOLUME, ML POC: 475 ML

## 2024-07-23 PROCEDURE — 51798 US URINE CAPACITY MEASURE: CPT

## 2024-07-23 PROCEDURE — 99024 POSTOP FOLLOW-UP VISIT: CPT

## 2024-07-23 PROCEDURE — 51702 INSERT TEMP BLADDER CATH: CPT

## 2024-07-23 NOTE — PROGRESS NOTES
7/23/2024    Roby Louis  1970  [id number]    Diagnosis  Chief Complaint    Urinary Retention         Patient presents for littlejohn removal and voiding trial managed by Dr. Herzog    Plan  Patient will remain with littlejohn until appt with the doctor on August 19 that is scheduled     Procedure Littlejohn removal/voiding trial    Patient requested another void trial for today prior to his cysto/trus in August     Littlejohn catheter removed after deflation of an intact balloon. Patient tolerated well. Encouraged patient to hydrate well and return this afternoon for post void residual.  he knows he may return early if uncomfortable and unable to urinate. Patient agrees to this plan.    Patient returned emergently at 1100 with complaints of inability to urinate and severe pain. Patient states unable to void. Patient voided 0 ml while in office. Bladder ultrasound performed and PVR measured 475ml.    Spoke with Asher and littlejohn to remain in until scheduled cysto trus. Patient agreeable at this time.     Recent Results (from the past 4 hour(s))   POCT Measure PVR    Collection Time: 07/23/24 11:29 AM   Result Value Ref Range    POST-VOID RESIDUAL VOLUME, ML  mL       Universal Protocol:  Consent: Verbal consent obtained.  Risks and benefits: risks, benefits and alternatives were discussed  Consent given by: patient  Patient understanding: patient states understanding of the procedure being performed  Patient consent: the patient's understanding of the procedure matches consent given  Patient identity confirmed: verbally with patient    Bladder catheterization    Date/Time: 7/23/2024 7:30 AM    Performed by: Suzette Bergeron RN  Authorized by: Keegan Herzog MD    Patient location:  Bedside  Consent:     Consent given by:  Patient  Universal protocol:     Procedure explained and questions answered to patient or proxy's satisfaction: yes      Patient identity confirmed:  Verbally with patient  Pre-procedure details:      "Procedure purpose:  Therapeutic  Anesthesia (see MAR for exact dosages):     Anesthesia method:  None  Procedure details:     Bladder irrigation: no      Catheter insertion:  Indwelling    Catheter type:  Coude, latex and Littlejohn    Catheter size:  16 Fr    Number of attempts:  1    Successful placement: yes      Urine characteristics:  Yellow  Post-procedure details:     Patient tolerance of procedure:  Tolerated well, no immediate complications  Comments:      10 ml sterile water removed from previous balloon. Previous catheter removed without issue. Prepped with betadine and new littlejohn was inserted without issue. 10 ml inserted into balloon. 450 ml drained from bladder and patient expresses relief of pain. Attached to leg bag. Extra bags supplied         Vitals:    07/23/24 0717   BP: 156/78   Weight: 63.5 kg (140 lb)   Height: 5' 4\" (1.626 m)           Suzette Bergeron RN       "

## 2024-08-02 DIAGNOSIS — E78.2 MIXED HYPERLIPIDEMIA: ICD-10-CM

## 2024-08-02 RX ORDER — ATORVASTATIN CALCIUM 40 MG/1
40 TABLET, FILM COATED ORAL DAILY
Qty: 90 TABLET | Refills: 1 | Status: SHIPPED | OUTPATIENT
Start: 2024-08-02

## 2024-08-13 ENCOUNTER — TELEPHONE (OUTPATIENT)
Age: 54
End: 2024-08-13

## 2024-08-13 NOTE — TELEPHONE ENCOUNTER
Attempted to call patient back. Unable to leave message     If patient calls back please see if he can come see nursing tomorrow at 1030 for me to check his littlejohn

## 2024-08-13 NOTE — TELEPHONE ENCOUNTER
Patient called requesting for his appointment from Monday to be moved up. He said he's having a hard time passing his urine in the catheter. He said he has to push out clots in order to get some urine in catheter. Also said he had pain, burning and a leaking catheter. I asked him to hold to speak with a nurse but he said no, that he'll wait until Monday to be seen.

## 2024-08-14 NOTE — TELEPHONE ENCOUNTER
I attempted to call patient and unable to leave a message. If he calls back, please offer him an appt with Suzette today as she noted below.

## 2024-08-15 NOTE — TELEPHONE ENCOUNTER
2nd attempt: I attempted to call patient and unable to leave a message. If he calls back, please offer him an appt with Suzette today as she noted below.     I have not been successful in reaching patient. He has an appt with [facility] on 8/19.

## 2024-08-16 ENCOUNTER — TELEPHONE (OUTPATIENT)
Dept: UROLOGY | Facility: CLINIC | Age: 54
End: 2024-08-16

## 2024-08-19 ENCOUNTER — PROCEDURE VISIT (OUTPATIENT)
Dept: UROLOGY | Facility: CLINIC | Age: 54
End: 2024-08-19

## 2024-08-19 VITALS
SYSTOLIC BLOOD PRESSURE: 154 MMHG | WEIGHT: 138 LBS | BODY MASS INDEX: 23.56 KG/M2 | DIASTOLIC BLOOD PRESSURE: 88 MMHG | HEART RATE: 97 BPM | OXYGEN SATURATION: 98 % | HEIGHT: 64 IN

## 2024-08-19 DIAGNOSIS — N13.8 BPH WITH OBSTRUCTION/LOWER URINARY TRACT SYMPTOMS: ICD-10-CM

## 2024-08-19 DIAGNOSIS — R33.8 URINARY RETENTION DUE TO BENIGN PROSTATIC HYPERPLASIA: ICD-10-CM

## 2024-08-19 DIAGNOSIS — N40.1 URINARY RETENTION DUE TO BENIGN PROSTATIC HYPERPLASIA: ICD-10-CM

## 2024-08-19 DIAGNOSIS — N40.1 BPH WITH OBSTRUCTION/LOWER URINARY TRACT SYMPTOMS: ICD-10-CM

## 2024-08-19 DIAGNOSIS — Z71.2 PERSON CONSULTING FOR EXPLANATION OF EXAMINATION OR TEST FINDING: Primary | ICD-10-CM

## 2024-08-19 DIAGNOSIS — R97.20 ELEVATED PSA: ICD-10-CM

## 2024-08-19 PROCEDURE — 52000 CYSTOURETHROSCOPY: CPT | Performed by: UROLOGY

## 2024-08-19 PROCEDURE — 99214 OFFICE O/P EST MOD 30 MIN: CPT | Performed by: UROLOGY

## 2024-08-19 RX ORDER — ACETAMINOPHEN 325 MG/1
975 TABLET ORAL ONCE
OUTPATIENT
Start: 2024-08-19 | End: 2024-08-19

## 2024-08-19 NOTE — ASSESSMENT & PLAN NOTE
I have spent a total time of 30 minutes in caring for this patient on the day of the visit/encounter including Diagnostic results, Prognosis, Risks and benefits of tx options, Instructions for management, Patient and family education, Importance of tx compliance, Risk factor reductions, Impressions, Counseling / Coordination of care, Documenting in the medical record, and Reviewing / ordering tests, medicine, procedures  .

## 2024-08-19 NOTE — ASSESSMENT & PLAN NOTE
Failed trial of void times many.  His prostate is not appropriate for UroLift or other minimally invasive options.  While technically he has the option of a laser-assisted enucleation of prostate I have concerns about reconstruction of his bladder neck given the significant intravesical portion of his prostate.  The intravesical portion of his prostate measured 73.1 g with a sizable portion within the lumen of the bladder as well.    We did talk about changes in sexual ejaculatory function after simple prostatectomy.  Should he have trouble with erectile function after surgery we can look into oral therapies, injection therapies, or penile prosthesis placement.    Questions and concerns answered and addressed.    He does understand the risks of surgery which include bladder neck contracture, urethral stricture, and infection, bleeding, pain, damage to surrounding structures, need for additional procedures, risk of failure of the procedure, risk of anesthesia, risk of positioning complications including neurapraxia, chronic pain, and paralysis as well as risk of rhabdomyolysis and compartment syndrome.  Risk of deep venous thrombosis and venous thromboembolism as well as pneumonia and other potential unpredictable complications were also discussed with the patient.    He will return for robot-assisted laparoscopic simple prostatectomy

## 2024-08-19 NOTE — PROGRESS NOTES
Office TRUS     Indication    Urinary retention    Informed consent   The risks, benefits and alternatives to this procedures were discussed with the patient and he has participated in the informed consent process and wishes to proceed      Transrectal ultrasonography  The patient was placed in the left lateral decubitus position. A 7.5 mHz sidefire ultrasound probe was gently inserted into the rectum and biplanar imaging of the prostate was done with the findings noted below. Images were taken of any abnormal findings and also to document prostate size.    Bladder  The bladder base appeared abnormal, with elevation and blockage.    Prostate      Ultrasound size measurements:  -Volume:  73.10 cm3 (with an additional large portion not captured on ultrasound that is within the lumen of the bladder    Ultrasound findings:  -Cysts: None  -Masses: None  -Median lobe: present                  Complications  There were no procedural complications.    Disposition  The patient was dismissed to home     Post-procedure instructions:     Today he underwent an uncomplicated transrectal ultrasound showing trilobar hypertrophy and a highly obstructed bladder outlet. Recommend robotic simple prostatectomy        Biopsy prostate     Date/Time  8/19/2024 1:30 PM     Performed by  Keegan Herzog MD   Authorized by  Keegan Herzog MD     Universal Protocol   procedure performed by consultantConsent: Verbal consent obtained. Written consent obtained.  Risks and benefits: risks, benefits and alternatives were discussed  Consent given by: patient  Patient understanding: patient states understanding of the procedure being performed  Patient consent: the patient's understanding of the procedure matches consent given  Procedure consent: procedure consent matches procedure scheduled  Relevant documents: relevant documents present and verified  Test results: test results available and properly labeled  Site marked: the operative site was  not marked  Radiology Images displayed and confirmed. If images not available, report reviewed: imaging studies available  Required items: required blood products, implants, devices, and special equipment available  Patient identity confirmed: verbally with patient and provided demographic data      Local anesthesia used: no     Anesthesia   Local anesthesia used: no     Sedation   Patient sedated: no        Specimen: no    Culture: no   Procedure Details   Procedure Notes: Office TRUS     Indication    Urinary retention    Informed consent   The risks, benefits and alternatives to this procedures were discussed with the patient and he has participated in the informed consent process and wishes to proceed      Transrectal ultrasonography  The patient was placed in the left lateral decubitus position. A 7.5 mHz sidefire ultrasound probe was gently inserted into the rectum and biplanar imaging of the prostate was done with the findings noted below. Images were taken of any abnormal findings and also to document prostate size.    Bladder  The bladder base appeared abnormal, with elevation and blockage.    Prostate      Ultrasound size measurements:  -Volume:  73.10 cm3 (with an additional large portion not captured on ultrasound that is within the lumen of the bladder    Ultrasound findings:  -Cysts: None  -Masses: None  -Median lobe: present                  Complications  There were no procedural complications.    Disposition  The patient was dismissed to home     Post-procedure instructions:     Today he underwent an uncomplicated transrectal ultrasound showing trilobar hypertrophy and a highly obstructed bladder outlet. Recommend robotic simple prostatectomy  Patient Transportation: confirmed  Patient tolerance: patient tolerated the procedure well with no immediate complications

## 2024-08-19 NOTE — ASSESSMENT & PLAN NOTE
Reassuring PSA density based on initial prostate volume as calculated by his CT scan with Granda catheter in place.  His prostate has decreased somewhat in size since starting finasteride.  I recommend simple prostatectomy for treatment of severe bladder outlet obstruction due to massive prostatic hypertrophy

## 2024-08-19 NOTE — PROGRESS NOTES
Ambulatory Visit  Name: Roby Louis      : 1970      MRN: [id number]  Encounter Provider: Keegan Herzog MD  Encounter Date: 2024   Encounter department: [facility] [place]    Assessment & Plan   1. BPH with obstruction/lower urinary tract symptoms  Assessment & Plan:  Failed trial of void times many.  His prostate is not appropriate for UroLift or other minimally invasive options.  While technically he has the option of a laser-assisted enucleation of prostate I have concerns about reconstruction of his bladder neck given the significant intravesical portion of his prostate.  The intravesical portion of his prostate measured 73.1 g with a sizable portion within the lumen of the bladder as well.    We did talk about changes in sexual ejaculatory function after simple prostatectomy.  Should he have trouble with erectile function after surgery we can look into oral therapies, injection therapies, or penile prosthesis placement.    Questions and concerns answered and addressed.    He does understand the risks of surgery which include bladder neck contracture, urethral stricture, and infection, bleeding, pain, damage to surrounding structures, need for additional procedures, risk of failure of the procedure, risk of anesthesia, risk of positioning complications including neurapraxia, chronic pain, and paralysis as well as risk of rhabdomyolysis and compartment syndrome.  Risk of deep venous thrombosis and venous thromboembolism as well as pneumonia and other potential unpredictable complications were also discussed with the patient.    He will return for robot-assisted laparoscopic simple prostatectomy  Orders:  -     Case request operating room: PROSTATECTOMY,SUPRAPUBIC LAPAROSCOPIC WITH ROBOTICS; Standing  -     Case request operating room: PROSTATECTOMY,SUPRAPUBIC LAPAROSCOPIC WITH ROBOTICS  2. Elevated PSA  Assessment & Plan:  Reassuring PSA density based on initial prostate  volume as calculated by his CT scan with Granda catheter in place.  His prostate has decreased somewhat in size since starting finasteride.  I recommend simple prostatectomy for treatment of severe bladder outlet obstruction due to massive prostatic hypertrophy  3. Person consulting for explanation of examination or test finding  Assessment & Plan:  I have spent a total time of 30 minutes in caring for this patient on the day of the visit/encounter including Diagnostic results, Prognosis, Risks and benefits of tx options, Instructions for management, Patient and family education, Importance of tx compliance, Risk factor reductions, Impressions, Counseling / Coordination of care, Documenting in the medical record, and Reviewing / ordering tests, medicine, procedures  .   4. Urinary retention due to benign prostatic hyperplasia  -     Case request operating room: PROSTATECTOMY,SUPRAPUBIC LAPAROSCOPIC WITH ROBOTICS; Standing  -     Case request operating room: PROSTATECTOMY,SUPRAPUBIC LAPAROSCOPIC WITH ROBOTICS          History of Present Illness     Roby Louis is a 53 y.o. male who presents for follow-up of urinary retention.  He has been wearing a Granda catheter for roughly 1 year.  He is motivated to have this removed.  He does have a history of elevated PSA in the past, this was 9.38 in November 2023 likely due to his massively enlarged prostate.  Also due to indwelling Granda catheter.    His prostate volume has decreased to 73.1 at the infra vesicle portion and he has a large portion of prostate within the bladder as well.    The best procedure for him in terms of maximizing his chance of long-term catheter free status is a robot-assisted laparoscopic simple prostatectomy.    The following portions of the patient's history were reviewed and updated as appropriate: allergies, current medications, past family history, past medical history, past social history, past surgical history and problem list.    Review of  "Systems   Constitutional: Negative.    HENT: Negative.     Eyes: Negative.    Respiratory: Negative.     Cardiovascular: Negative.    Gastrointestinal: Negative.    Endocrine: Negative.    Genitourinary:  Positive for difficulty urinating.   Musculoskeletal: Negative.    Skin: Negative.    Allergic/Immunologic: Negative.    Neurological: Negative.    Hematological: Negative.    Psychiatric/Behavioral: Negative.           Objective     /88 (BP Location: Left arm, Patient Position: Sitting, Cuff Size: Standard)   Pulse 97   Ht 5' 4\" (1.626 m)   Wt 62.6 kg (138 lb)   SpO2 98%   BMI 23.69 kg/m²   Physical Exam  Vitals reviewed.   Constitutional:       General: He is not in acute distress.     Appearance: Normal appearance. He is well-developed. He is not ill-appearing, toxic-appearing or diaphoretic.   HENT:      Head: Normocephalic and atraumatic.      Nose: Nose normal.      Mouth/Throat:      Mouth: Mucous membranes are moist.   Eyes:      General: No scleral icterus.        Right eye: No discharge.         Left eye: No discharge.   Neck:      Thyroid: No thyromegaly.      Trachea: No tracheal deviation.   Pulmonary:      Effort: Pulmonary effort is normal.   Chest:      Chest wall: No tenderness.   Abdominal:      General: There is no distension.      Palpations: There is no mass.      Tenderness: There is no abdominal tenderness. There is no guarding.      Hernia: No hernia is present.   Genitourinary:     Penis: Normal.    Musculoskeletal:         General: No deformity or signs of injury. Normal range of motion.      Cervical back: Normal range of motion and neck supple.   Lymphadenopathy:      Cervical: No cervical adenopathy.   Skin:     General: Skin is dry.      Coloration: Skin is not jaundiced or pale.      Findings: No erythema.   Neurological:      Mental Status: He is alert and oriented to person, place, and time.      Cranial Nerves: No cranial nerve deficit.      Motor: No abnormal muscle " tone.      Coordination: Coordination normal.   Psychiatric:         Mood and Affect: Mood normal.         Behavior: Behavior normal.         Thought Content: Thought content normal.         Judgment: Judgment normal.       Results    PSA 9.38 in November 2023    Lab Results   Component Value Date    PSA 4.0 04/05/2021     Lab Results   Component Value Date    CALCIUM 9.2 08/24/2023    K 3.6 08/24/2023    CO2 28 08/24/2023     08/24/2023    BUN 19 08/24/2023    CREATININE 1.04 08/24/2023     Lab Results   Component Value Date    WBC 6.34 08/24/2023    HGB 12.9 08/24/2023    HCT 37.9 08/24/2023    MCV 86 08/24/2023     08/24/2023       Office Urine Dip  No results found for this or any previous visit (from the past 1 hour(s)).]    Administrative Statements

## 2024-08-19 NOTE — PROGRESS NOTES
Office Cystoscopy Procedure Note    Indication:     urinary retention     Informed consent   The risks, benefits, complications, treatment options, and expected outcomes were discussed with the patient. The patient concurred with the proposed plan and provided informed consent.    Anesthesia  Lidocaine jelly 2%    Antibiotic prophylaxis   None    Procedure  The patient was placed in the supineposition, was prepped and draped in the usual manner using sterile technique, and 2% lidocaine jelly instilled into the urethra.  A 17 F flexible cystoscope was then inserted into the urethra and the urethra and bladder carefully examined.  The following findings were noted:    Findings:  Urethra:  Normal  Prostate:  massic lateral lobe hypertrophy, large median lobe, no lesions  Bladder:  No lesions no tumors, most of the lumen of the bladder is taken up by the large prostate adenoma projecting in to the bladder  Ureteral orifices:  orthotopic  Other findings:  None, retroflexed view confirms    Specimens: None                 Complications:    None; patient tolerated the procedure well           Disposition: To home            Condition: Stable    Plan: Massive prostatic hypertrophy, recommend robotic simple prostatectomy       Cystoscopy     Date/Time  8/19/2024 1:30 PM     Performed by  Keegan Herzog MD   Authorized by  Keegan Herzog MD     Universal Protocol:  procedure performed by consultantConsent: Verbal consent obtained. Written consent obtained.  Risks and benefits: risks, benefits and alternatives were discussed  Consent given by: patient  Patient understanding: patient states understanding of the procedure being performed  Patient consent: the patient's understanding of the procedure matches consent given  Procedure consent: procedure consent matches procedure scheduled  Relevant documents: relevant documents present and verified  Test results: test results available and properly labeled  Site marked: the  operative site was not marked  Radiology Images displayed and confirmed. If images not available, report reviewed: imaging studies available  Required items: required blood products, implants, devices, and special equipment available  Patient identity confirmed: verbally with patient and provided demographic data      Procedure Details:  Procedure type: cystoscopy    Patient tolerance: Patient tolerated the procedure well with no immediate complications    Additional Procedure Details: Office Cystoscopy Procedure Note    Indication:     urinary retention     Informed consent   The risks, benefits, complications, treatment options, and expected outcomes were discussed with the patient. The patient concurred with the proposed plan and provided informed consent.    Anesthesia  Lidocaine jelly 2%    Antibiotic prophylaxis   None    Procedure  The patient was placed in the supineposition, was prepped and draped in the usual manner using sterile technique, and 2% lidocaine jelly instilled into the urethra.  A 17 F flexible cystoscope was then inserted into the urethra and the urethra and bladder carefully examined.  The following findings were noted:    Findings:  Urethra:  Normal  Prostate:  massic lateral lobe hypertrophy, large median lobe, no lesions  Bladder:  No lesions no tumors, most of the lumen of the bladder is taken up by the large prostate adenoma projecting in to the bladder  Ureteral orifices:  orthotopic  Other findings:  None, retroflexed view confirms    Specimens: None                 Complications:    None; patient tolerated the procedure well           Disposition: To home            Condition: Stable    Plan: Massive prostatic hypertrophy, recommend robotic simple prostatectomy

## 2024-08-26 ENCOUNTER — PREP FOR PROCEDURE (OUTPATIENT)
Dept: UROLOGY | Facility: CLINIC | Age: 54
End: 2024-08-26

## 2024-08-26 ENCOUNTER — TELEPHONE (OUTPATIENT)
Dept: UROLOGY | Facility: CLINIC | Age: 54
End: 2024-08-26

## 2024-08-26 DIAGNOSIS — Z01.812 PRE-OPERATIVE LABORATORY EXAMINATION: ICD-10-CM

## 2024-08-26 DIAGNOSIS — N13.8 BPH WITH OBSTRUCTION/LOWER URINARY TRACT SYMPTOMS: Primary | ICD-10-CM

## 2024-08-26 DIAGNOSIS — N40.1 BPH WITH OBSTRUCTION/LOWER URINARY TRACT SYMPTOMS: Primary | ICD-10-CM

## 2024-08-26 DIAGNOSIS — Z01.810 PRE-OPERATIVE CARDIOVASCULAR EXAMINATION: ICD-10-CM

## 2024-08-26 DIAGNOSIS — Z79.01 LONG TERM (CURRENT) USE OF ANTICOAGULANTS: ICD-10-CM

## 2024-08-26 DIAGNOSIS — R39.89 SUSPECTED UTI: ICD-10-CM

## 2024-08-26 NOTE — TELEPHONE ENCOUNTER
LVM for [facility] to return my call re: scheduling pt for Medical Clearance appt/pt is scheduled for 12/9/24 w/ Dr Herzog

## 2024-08-26 NOTE — TELEPHONE ENCOUNTER
Patient called asking about if the insurance paperwork he dropped off at his appointment on 8/19/2024 is ready to be picked up. Please advise.

## 2024-08-26 NOTE — TELEPHONE ENCOUNTER
Spoke with patient and confirmed surgery date of:12/9/24  Type of surgery:PROSTATECTOMY SUPRA PUBIC LAP W/ ROBOTICS  Operating physician: Dr. Herzog  Location of surgery: [facility]    Verbally went over prep with patient on: 8/26/24  NPO  Bowel prep? Yes  Hospital calls afternoon prior with arrival time -Calls Friday afternoon for Monday surgeries  Patient needs ride to and from surgery (outpatient/inpatient)   Pre-op testing to be done 2 weeks prior to surgery/PRE OP LABS, URINE CULTURE, EKG ORDERED FOR 11/19/24  (list labs needed)  Blood thinners:   List blood thinner/how long needs to held or no hold needed  Clearances needed: MEDICAL(Medical/Cardiac/None)    Mailed/emailed to patient on:MAILED TO PT 8/27/24  Copy of packet scanned into Media on:8/27/24  Labs in packet  Soap / Bowel prep in packet  Pre-op & Post-op in packet  Dates of H&P and post-op if needed    Consent: in Media or on admit  If needed:CONSENT TO BE SIGNED AT PRE OP APPT    Medical/Cardiac Clearance:DR ELIAS SCHOEN  Appt with:  Appt date and time:  Date clearance form faxed:  Best fax number:    Medication Suspension of: Medication Name / how many days  Ordering provider:  Faxed Medication Suspension form on:  Best fax number:    [place]:  Faxed form to pharmacy on:    RBC blood bank done on:8/27/24    Rep info:  Emailed rep on date:

## 2024-08-27 NOTE — TELEPHONE ENCOUNTER
TRIED TO CALL PT BUT PHONE NUMBER WOULDN'T TAKE A MESSAGE. HIS DAUGHTER WAS CALLED TO RELAY MESSAGE TO PT. SHE IS ON THE COMM CONSENT. FORMS WILL BE AT THE  IN THE BIN AT THE [place] OFFICE.

## 2024-08-28 ENCOUNTER — TELEPHONE (OUTPATIENT)
Dept: FAMILY MEDICINE CLINIC | Facility: CLINIC | Age: 54
End: 2024-08-28

## 2024-08-28 NOTE — TELEPHONE ENCOUNTER
PCP SIGNATURE NEEDED FOR [facility]'s Urology FORM RECEIVED VIA FAX AND PLACED IN PCP FOLDER TO BE DELIVERED AT ASSIGNED TIMES.    Pre-surgical clearance request   Prostatectomy, suprapubic LAP & Robotics     Appt scheduled on 10/16/24. Thanks!

## 2024-09-12 ENCOUNTER — TELEPHONE (OUTPATIENT)
Dept: FAMILY MEDICINE CLINIC | Facility: CLINIC | Age: 54
End: 2024-09-12

## 2024-09-12 NOTE — TELEPHONE ENCOUNTER
Appointment on 10/16 needs to be rescheduled. Tried calling patient but automated message states phone is not accepting alls at this time. If patient contacts office, please offer appointment on 10/17 at 1:40 pm. Thank you.

## 2024-09-18 NOTE — ASSESSMENT
Controlled with miralax  Had recurrent UTIs originally before this was controlled   [FreeTextEntry1] : Very pleasant 49-year-old gentleman who presents for followup of urinary retention, BPH\par -We had an extensive discussion regarding various outlet procedures that may be beneficial\par -We discussed potential etiologies of his symptoms, including bladder outlet obstruction versus detrusor dysfunction\par -Patient is interested in a minimally invasive procedure to preserve ejaculation\par -We discussed the utility of urodynamics, and he would like to do urodynamics prior to proceeding with an outlet procedure

## 2024-10-01 ENCOUNTER — PROCEDURE VISIT (OUTPATIENT)
Dept: UROLOGY | Facility: CLINIC | Age: 54
End: 2024-10-01

## 2024-10-01 VITALS
DIASTOLIC BLOOD PRESSURE: 80 MMHG | HEIGHT: 64 IN | OXYGEN SATURATION: 98 % | WEIGHT: 139 LBS | BODY MASS INDEX: 23.73 KG/M2 | SYSTOLIC BLOOD PRESSURE: 160 MMHG | HEART RATE: 95 BPM

## 2024-10-01 DIAGNOSIS — N13.8 BPH WITH OBSTRUCTION/LOWER URINARY TRACT SYMPTOMS: Primary | ICD-10-CM

## 2024-10-01 DIAGNOSIS — N40.1 URINARY RETENTION DUE TO BENIGN PROSTATIC HYPERPLASIA: ICD-10-CM

## 2024-10-01 DIAGNOSIS — N40.1 BPH WITH OBSTRUCTION/LOWER URINARY TRACT SYMPTOMS: Primary | ICD-10-CM

## 2024-10-01 DIAGNOSIS — R33.8 URINARY RETENTION DUE TO BENIGN PROSTATIC HYPERPLASIA: ICD-10-CM

## 2024-10-01 PROCEDURE — 51702 INSERT TEMP BLADDER CATH: CPT

## 2024-10-01 NOTE — PROGRESS NOTES
"10/1/2024  Roby Louis is a 53 y.o. male  [id number]    Diagnosis:  Chief Complaint    Urinary Retention         Patient presents for routine littlejohn exchange managed by Dr. Herzog    Plan:  Patient will follow up as scheduled for next exchange  Patient instructed to call with any questions or concerns in the meantime.    No orders of the defined types were placed in this encounter.       Vitals:    10/01/24 1103   BP: 160/80   Pulse: 95   SpO2: 98%   Weight: 63 kg (139 lb)   Height: 5' 4\" (1.626 m)     Of note, patients insurance has lapsed. He knows he needs new insurance to be able to get surgery in December. He was asking me what insurance he has to get which I am unsure. I will send encounter to  and people in charge of surgery insurance clearance.       Procedure:    Universal Protocol:  procedure performed by consultantConsent: Verbal consent obtained.  Risks and benefits: risks, benefits and alternatives were discussed  Consent given by: patient  Patient understanding: patient states understanding of the procedure being performed  Patient consent: the patient's understanding of the procedure matches consent given  Patient identity confirmed: verbally with patient    Bladder catheterization    Date/Time: 10/1/2024 11:00 AM    Performed by: Suzette Bergeron RN  Authorized by: Keegan Herzog MD    Patient location:  Bedside  Consent:     Consent given by:  Patient  Universal protocol:     Procedure explained and questions answered to patient or proxy's satisfaction: yes      Patient identity confirmed:  Verbally with patient  Anesthesia (see MAR for exact dosages):     Anesthesia method:  None  Procedure details:     Bladder irrigation: no      Catheter insertion:  Indwelling    Approach: natural orifice      Catheter type:  Coude, latex and Littlejohn    Catheter size:  16 Fr    Number of attempts:  1    Urine characteristics:  Yellow and blood-tinged  Post-procedure details:     Patient tolerance of " procedure:  Tolerated well, no immediate complications  Comments:       10 ml sterile water removed from previous balloon. Previous catheter removed without issue. Prepped with betadine. New littlejohn inserted without issue. 10 ml inserted into balloon. Flushed with 10 ml sterile water for return. Attached to leg bag. Extra bags supplied           Suzette Bergeron RN

## 2024-10-23 ENCOUNTER — CONSULT (OUTPATIENT)
Dept: FAMILY MEDICINE CLINIC | Facility: CLINIC | Age: 54
End: 2024-10-23

## 2024-10-23 VITALS
DIASTOLIC BLOOD PRESSURE: 84 MMHG | SYSTOLIC BLOOD PRESSURE: 142 MMHG | RESPIRATION RATE: 18 BRPM | WEIGHT: 140 LBS | BODY MASS INDEX: 23.9 KG/M2 | OXYGEN SATURATION: 98 % | TEMPERATURE: 97.5 F | HEIGHT: 64 IN | HEART RATE: 91 BPM

## 2024-10-23 DIAGNOSIS — I10 PRIMARY HYPERTENSION: Primary | ICD-10-CM

## 2024-10-23 RX ORDER — LISINOPRIL 40 MG/1
40 TABLET ORAL DAILY
Qty: 30 TABLET | Refills: 0 | Status: SHIPPED | OUTPATIENT
Start: 2024-10-23 | End: 2024-11-22

## 2024-10-23 RX ORDER — LOSARTAN POTASSIUM AND HYDROCHLOROTHIAZIDE 12.5; 5 MG/1; MG/1
1 TABLET ORAL DAILY
Qty: 30 TABLET | Refills: 0 | Status: SHIPPED | OUTPATIENT
Start: 2024-10-23 | End: 2024-10-23 | Stop reason: ALTCHOICE

## 2024-10-23 NOTE — ASSESSMENT & PLAN NOTE
BP Readings from Last 3 Encounters:   10/23/24 142/84   10/01/24 160/80   08/19/24 154/88   Asymptomatic on presentation.  Previously on lisinopril 40 mg daily but never refilled.  Will refill and follow-up in a month  Patient advised to keep record of a.m. and p.m. BP readings    Plan:  Lisinopril 40 mg daily  Blood pressure monitoring at home  Patient bring home BP readings next visit  Follow-up in 4 weeks  Orders:    lisinopril (ZESTRIL) 40 mg tablet; Take 1 tablet (40 mg total) by mouth daily

## 2024-10-23 NOTE — PROGRESS NOTES
Ambulatory Visit  Name: Roby Louis      : 1970      MRN: [id number]  Encounter Provider: Emmanuel Ivan MD  Encounter Date: 10/23/2024   Encounter department: [facility]    Assessment & Plan  Primary hypertension  BP Readings from Last 3 Encounters:   10/23/24 142/84   10/01/24 160/80   24 154/88   Asymptomatic on presentation.  Previously on lisinopril 40 mg daily but never refilled.  Will refill and follow-up in a month  Patient advised to keep record of a.m. and p.m. BP readings    Plan:  Lisinopril 40 mg daily  Blood pressure monitoring at home  Patient bring home BP readings next visit  Follow-up in 4 weeks  Orders:    lisinopril (ZESTRIL) 40 mg tablet; Take 1 tablet (40 mg total) by mouth daily       History of Present Illness     Patient is a 53-year-old male with PMH of BPH and hypertension presenting today to discuss chronic medical conditions.  Patient initially presented for preop eval but surgery scheduled in December so patient made aware to follow-up in a month prior to procedure.  Elevated BP on presentation.  Patient asymptomatic.  Reports medication noncompliance due to not refilling.  Advised importance of continuous medication administration.  Amenable to restarting lisinopril and rechecking in 4 weeks.  Patient has BP monitoring device at home and advised records of a.m. and p.m. readings.    Of note, patient upcoming surgical procedure for prostatectomy.  Patient to return on  for preop eval.  Patient to get lab work completed on  prior to clinic visit.          Review of Systems   Constitutional:  Negative for chills, diaphoresis, fatigue and fever.   Respiratory:  Negative for shortness of breath.    Gastrointestinal:  Negative for diarrhea, nausea and vomiting.   Genitourinary:  Negative for flank pain and hematuria.        Granda catheter   Musculoskeletal:  Positive for back pain (chronic).   Neurological:  Negative for weakness.  "  Psychiatric/Behavioral: Negative.             Objective     /84 (BP Location: Left arm, Patient Position: Sitting, Cuff Size: Standard)   Pulse 91   Temp 97.5 °F (36.4 °C) (Temporal)   Resp 18   Ht 5' 4\" (1.626 m)   Wt 63.5 kg (140 lb)   SpO2 98%   BMI 24.03 kg/m²     Physical Exam  Constitutional:       General: He is not in acute distress.     Appearance: Normal appearance.   HENT:      Head: Normocephalic and atraumatic.   Eyes:      Extraocular Movements: Extraocular movements intact.   Cardiovascular:      Rate and Rhythm: Normal rate and regular rhythm.      Pulses: Normal pulses.   Pulmonary:      Effort: Pulmonary effort is normal.      Breath sounds: Normal breath sounds.   Abdominal:      Palpations: Abdomen is soft.      Tenderness: There is no abdominal tenderness. There is no right CVA tenderness or left CVA tenderness.   Genitourinary:     Penis: Normal.       Comments: Granda catheter intact  Musculoskeletal:         General: Normal range of motion.      Cervical back: Normal range of motion and neck supple. No rigidity.   Skin:     General: Skin is warm and dry.   Neurological:      Mental Status: He is alert.   Psychiatric:         Mood and Affect: Mood normal.         "

## 2024-11-12 ENCOUNTER — PROCEDURE VISIT (OUTPATIENT)
Dept: UROLOGY | Facility: CLINIC | Age: 54
End: 2024-11-12

## 2024-11-12 ENCOUNTER — TELEPHONE (OUTPATIENT)
Dept: UROLOGY | Facility: CLINIC | Age: 54
End: 2024-11-12

## 2024-11-12 VITALS
OXYGEN SATURATION: 99 % | SYSTOLIC BLOOD PRESSURE: 140 MMHG | HEART RATE: 89 BPM | HEIGHT: 64 IN | BODY MASS INDEX: 23.9 KG/M2 | DIASTOLIC BLOOD PRESSURE: 78 MMHG | WEIGHT: 140 LBS

## 2024-11-12 DIAGNOSIS — N32.89 BLADDER SPASMS: Primary | ICD-10-CM

## 2024-11-12 PROCEDURE — 51702 INSERT TEMP BLADDER CATH: CPT

## 2024-11-12 RX ORDER — OXYBUTYNIN CHLORIDE 10 MG/1
10 TABLET, EXTENDED RELEASE ORAL
Qty: 90 TABLET | Refills: 0 | Status: SHIPPED | OUTPATIENT
Start: 2024-11-12 | End: 2025-02-10

## 2024-11-12 NOTE — PROGRESS NOTES
"11/12/2024  Roby Louis is a 53 y.o. male  [id number]    Diagnosis:  Chief Complaint    Urinary Retention         Patient presents for routine littlejohn exchange managed by Dr. Herzog    Plan:  Patient will follow up as scheduled for surgery in December  Patient instructed to call with any questions or concerns in the meantime.    No orders of the defined types were placed in this encounter.       Vitals:    11/12/24 1059   BP: 140/78   Pulse: 89   SpO2: 99%   Weight: 63.5 kg (140 lb)   Height: 5' 4\" (1.626 m)     Patient has complains of bladder pain that is relatively consistent. Also questioning if the surgery he is scheduled for is the best on for him. Advised that the surgeon thinks this to be the best procedure for him with the best results long term. I did speak with Dr. Herzog in office while patient was in office and he will send patient oxybutynin to help with pain while we await surgery     Also patient still does not have current insurance. He reports he is establishing insurance with another carrier this week and should have the information available next week. Advised I would let the surgery scheduler know.     Procedure:    Universal Protocol:  procedure performed by consultantConsent: Verbal consent obtained.  Risks and benefits: risks, benefits and alternatives were discussed  Consent given by: patient  Patient understanding: patient states understanding of the procedure being performed  Patient consent: the patient's understanding of the procedure matches consent given  Patient identity confirmed: verbally with patient    Bladder catheterization    Date/Time: 11/12/2024 11:00 AM    Performed by: Suzette Bergeron RN  Authorized by: Keegan Herzog MD    Patient location:  Bedside  Consent:     Consent given by:  Patient  Universal protocol:     Procedure explained and questions answered to patient or proxy's satisfaction: yes      Patient identity confirmed:  Verbally with patient  Pre-procedure " details:     Procedure purpose:  Therapeutic  Anesthesia (see MAR for exact dosages):     Anesthesia method:  None  Procedure details:     Bladder irrigation: no      Catheter insertion:  Indwelling    Approach: natural orifice      Catheter type:  Coude and Littlejohn    Catheter size:  16 Fr    Number of attempts:  1    Successful placement: yes      Urine characteristics:  Blood-tinged  Post-procedure details:     Patient tolerance of procedure:  Tolerated well, no immediate complications  Comments:      10 ml sterile water removed from previous balloon. Previous catheter removed without issue. Prepped with betadine. New littlejohn inserted without issue. 10 ml inserted into balloon. Flushed with 10 ml sterile water for return. Attached to leg bag. Extra bags supplied           Suzette Bergeron RN

## 2024-11-12 NOTE — TELEPHONE ENCOUNTER
Patient seen in office today for littlejohn change.     He does not have active insurance and hasn't had it since October and is scheduled for surgery in a month. He should have new insurance by the end of this week/next week.    Routing to  and auth team so they are aware that new insurance info will need to be obtained prior to procedure.

## 2024-11-19 ENCOUNTER — APPOINTMENT (OUTPATIENT)
Dept: LAB | Facility: HOSPITAL | Age: 54
End: 2024-11-19

## 2024-11-19 ENCOUNTER — APPOINTMENT (OUTPATIENT)
Dept: LAB | Facility: CLINIC | Age: 54
End: 2024-11-19
Payer: MEDICAID

## 2024-11-19 DIAGNOSIS — Z79.01 LONG TERM (CURRENT) USE OF ANTICOAGULANTS: ICD-10-CM

## 2024-11-19 DIAGNOSIS — E78.2 MIXED HYPERLIPIDEMIA: ICD-10-CM

## 2024-11-19 DIAGNOSIS — N13.8 BPH WITH OBSTRUCTION/LOWER URINARY TRACT SYMPTOMS: ICD-10-CM

## 2024-11-19 DIAGNOSIS — N40.1 BPH WITH OBSTRUCTION/LOWER URINARY TRACT SYMPTOMS: ICD-10-CM

## 2024-11-19 DIAGNOSIS — Z11.59 NEED FOR HEPATITIS C SCREENING TEST: ICD-10-CM

## 2024-11-19 DIAGNOSIS — Z01.810 PRE-OPERATIVE CARDIOVASCULAR EXAMINATION: ICD-10-CM

## 2024-11-19 DIAGNOSIS — Z01.812 PRE-OPERATIVE LABORATORY EXAMINATION: ICD-10-CM

## 2024-11-19 DIAGNOSIS — R63.4 UNINTENTIONAL WEIGHT LOSS: ICD-10-CM

## 2024-11-19 DIAGNOSIS — R39.89 SUSPECTED UTI: ICD-10-CM

## 2024-11-19 DIAGNOSIS — I10 PRIMARY HYPERTENSION: ICD-10-CM

## 2024-11-19 LAB
ABO GROUP BLD: NORMAL
ALBUMIN SERPL BCG-MCNC: 4.3 G/DL (ref 3.5–5)
ALP SERPL-CCNC: 86 U/L (ref 34–104)
ALT SERPL W P-5'-P-CCNC: 30 U/L (ref 7–52)
ANION GAP SERPL CALCULATED.3IONS-SCNC: 10 MMOL/L (ref 4–13)
APTT PPP: 38 SECONDS (ref 23–34)
AST SERPL W P-5'-P-CCNC: 21 U/L (ref 13–39)
BASOPHILS # BLD AUTO: 0.05 THOUSANDS/ÂΜL (ref 0–0.1)
BASOPHILS NFR BLD AUTO: 1 % (ref 0–1)
BILIRUB SERPL-MCNC: 0.56 MG/DL (ref 0.2–1)
BLD GP AB SCN SERPL QL: NEGATIVE
BUN SERPL-MCNC: 19 MG/DL (ref 5–25)
CALCIUM SERPL-MCNC: 9.6 MG/DL (ref 8.4–10.2)
CHLORIDE SERPL-SCNC: 101 MMOL/L (ref 96–108)
CO2 SERPL-SCNC: 27 MMOL/L (ref 21–32)
CREAT SERPL-MCNC: 1.22 MG/DL (ref 0.6–1.3)
EOSINOPHIL # BLD AUTO: 0.31 THOUSAND/ÂΜL (ref 0–0.61)
EOSINOPHIL NFR BLD AUTO: 4 % (ref 0–6)
ERYTHROCYTE [DISTWIDTH] IN BLOOD BY AUTOMATED COUNT: 13.2 % (ref 11.6–15.1)
EST. AVERAGE GLUCOSE BLD GHB EST-MCNC: 114 MG/DL
GFR SERPL CREATININE-BSD FRML MDRD: 66 ML/MIN/1.73SQ M
GLUCOSE P FAST SERPL-MCNC: 86 MG/DL (ref 65–99)
HBA1C MFR BLD: 5.6 %
HCT VFR BLD AUTO: 42.3 % (ref 36.5–49.3)
HCV AB SER QL: NORMAL
HGB BLD-MCNC: 13.7 G/DL (ref 12–17)
IMM GRANULOCYTES # BLD AUTO: 0.03 THOUSAND/UL (ref 0–0.2)
IMM GRANULOCYTES NFR BLD AUTO: 0 % (ref 0–2)
INR PPP: 1.07 (ref 0.85–1.19)
LYMPHOCYTES # BLD AUTO: 2.6 THOUSANDS/ÂΜL (ref 0.6–4.47)
LYMPHOCYTES NFR BLD AUTO: 30 % (ref 14–44)
MCH RBC QN AUTO: 29.1 PG (ref 26.8–34.3)
MCHC RBC AUTO-ENTMCNC: 32.4 G/DL (ref 31.4–37.4)
MCV RBC AUTO: 90 FL (ref 82–98)
MONOCYTES # BLD AUTO: 0.67 THOUSAND/ÂΜL (ref 0.17–1.22)
MONOCYTES NFR BLD AUTO: 8 % (ref 4–12)
NEUTROPHILS # BLD AUTO: 4.95 THOUSANDS/ÂΜL (ref 1.85–7.62)
NEUTS SEG NFR BLD AUTO: 57 % (ref 43–75)
NRBC BLD AUTO-RTO: 0 /100 WBCS
PLATELET # BLD AUTO: 261 THOUSANDS/UL (ref 149–390)
PMV BLD AUTO: 10.3 FL (ref 8.9–12.7)
POTASSIUM SERPL-SCNC: 4.4 MMOL/L (ref 3.5–5.3)
PROT SERPL-MCNC: 8.7 G/DL (ref 6.4–8.4)
PROTHROMBIN TIME: 14.2 SECONDS (ref 12.3–15)
PSA FREE MFR SERPL: 7.49 %
PSA FREE SERPL-MCNC: 0.68 NG/ML
PSA SERPL-MCNC: 9.06 NG/ML (ref 0–4)
RBC # BLD AUTO: 4.71 MILLION/UL (ref 3.88–5.62)
RH BLD: POSITIVE
SODIUM SERPL-SCNC: 138 MMOL/L (ref 135–147)
SPECIMEN EXPIRATION DATE: NORMAL
TSH SERPL DL<=0.05 MIU/L-ACNC: 0.96 UIU/ML (ref 0.45–4.5)
WBC # BLD AUTO: 8.61 THOUSAND/UL (ref 4.31–10.16)

## 2024-11-19 PROCEDURE — 87077 CULTURE AEROBIC IDENTIFY: CPT

## 2024-11-19 PROCEDURE — 86850 RBC ANTIBODY SCREEN: CPT | Performed by: UROLOGY

## 2024-11-19 PROCEDURE — 84153 ASSAY OF PSA TOTAL: CPT

## 2024-11-19 PROCEDURE — 86900 BLOOD TYPING SEROLOGIC ABO: CPT | Performed by: UROLOGY

## 2024-11-19 PROCEDURE — 85730 THROMBOPLASTIN TIME PARTIAL: CPT

## 2024-11-19 PROCEDURE — 87086 URINE CULTURE/COLONY COUNT: CPT

## 2024-11-19 PROCEDURE — 80053 COMPREHEN METABOLIC PANEL: CPT

## 2024-11-19 PROCEDURE — 86923 COMPATIBILITY TEST ELECTRIC: CPT

## 2024-11-19 PROCEDURE — 86901 BLOOD TYPING SEROLOGIC RH(D): CPT | Performed by: UROLOGY

## 2024-11-19 PROCEDURE — 84154 ASSAY OF PSA FREE: CPT

## 2024-11-19 PROCEDURE — 85610 PROTHROMBIN TIME: CPT

## 2024-11-19 PROCEDURE — 87186 SC STD MICRODIL/AGAR DIL: CPT

## 2024-11-19 PROCEDURE — 36415 COLL VENOUS BLD VENIPUNCTURE: CPT

## 2024-11-19 PROCEDURE — 85025 COMPLETE CBC W/AUTO DIFF WBC: CPT

## 2024-11-19 PROCEDURE — 86803 HEPATITIS C AB TEST: CPT

## 2024-11-19 PROCEDURE — 84443 ASSAY THYROID STIM HORMONE: CPT

## 2024-11-19 PROCEDURE — 83036 HEMOGLOBIN GLYCOSYLATED A1C: CPT

## 2024-11-19 RX ORDER — LISINOPRIL 40 MG/1
40 TABLET ORAL DAILY
Qty: 30 TABLET | Refills: 0 | Status: SHIPPED | OUTPATIENT
Start: 2024-11-19

## 2024-11-20 ENCOUNTER — LAB REQUISITION (OUTPATIENT)
Dept: LAB | Facility: HOSPITAL | Age: 54
End: 2024-11-20

## 2024-11-20 ENCOUNTER — ANESTHESIA EVENT (OUTPATIENT)
Dept: PERIOP | Facility: HOSPITAL | Age: 54
DRG: 710 | End: 2024-11-20
Payer: MEDICAID

## 2024-11-20 DIAGNOSIS — N13.8 OTHER OBSTRUCTIVE AND REFLUX UROPATHY: ICD-10-CM

## 2024-11-20 DIAGNOSIS — N40.1 BENIGN PROSTATIC HYPERPLASIA WITH LOWER URINARY TRACT SYMPTOMS: ICD-10-CM

## 2024-11-20 DIAGNOSIS — Z01.812 ENCOUNTER FOR PREPROCEDURAL LABORATORY EXAMINATION: ICD-10-CM

## 2024-11-20 LAB
ABO GROUP BLD: NORMAL
ATRIAL RATE: 82 BPM
BLD GP AB SCN SERPL QL: NEGATIVE
P AXIS: 71 DEGREES
PR INTERVAL: 152 MS
QRS AXIS: 41 DEGREES
QRSD INTERVAL: 80 MS
QT INTERVAL: 354 MS
QTC INTERVAL: 414 MS
RH BLD: POSITIVE
SPECIMEN EXPIRATION DATE: NORMAL
T WAVE AXIS: 57 DEGREES
VENTRICULAR RATE: 82 BPM

## 2024-11-20 PROCEDURE — 93010 ELECTROCARDIOGRAM REPORT: CPT

## 2024-11-21 ENCOUNTER — RESULTS FOLLOW-UP (OUTPATIENT)
Dept: UROLOGY | Facility: CLINIC | Age: 54
End: 2024-11-21

## 2024-11-21 DIAGNOSIS — R82.71 BACTERIURIA: Primary | ICD-10-CM

## 2024-11-21 RX ORDER — CIPROFLOXACIN 500 MG/1
500 TABLET, FILM COATED ORAL EVERY 12 HOURS SCHEDULED
Qty: 14 TABLET | Refills: 0 | Status: SHIPPED | OUTPATIENT
Start: 2024-11-21 | End: 2024-11-28

## 2024-11-21 RX ORDER — NITROFURANTOIN 25; 75 MG/1; MG/1
100 CAPSULE ORAL 2 TIMES DAILY
Qty: 14 CAPSULE | Refills: 0 | Status: SHIPPED | OUTPATIENT
Start: 2024-11-21 | End: 2024-11-28

## 2024-11-21 NOTE — PRE-PROCEDURE INSTRUCTIONS
Pre-Surgery Instructions:   Medication Instructions    lisinopril (ZESTRIL) 40 mg tablet Hold day of surgery.    oxybutynin (DITROPAN-XL) 10 MG 24 hr tablet Hold day of surgery.    Medication instructions for day surgery reviewed. Please use only a sip of water to take your instructed medications. Avoid all over the counter vitamins, supplements and NSAIDS for one week prior to surgery per anesthesia guidelines. Tylenol is ok to take as needed.     You will receive a call one business day prior to surgery with an arrival time and hospital directions. If your surgery is scheduled on a Monday, the hospital will be calling you on the Friday prior to your surgery. If you have not heard from anyone by 8pm, please call the hospital supervisor through the hospital  at [phone]. ([place] [phone] or [place] [phone]).    Do not eat or drink anything after midnight the night before your surgery, including candy, mints, lifesavers, or chewing gum. Do not drink alcohol 24hrs before your surgery. Try not to smoke at least 24hrs before your surgery.       Follow the pre surgery showering instructions as listed in the “My Surgical Experience Booklet” or otherwise provided by your surgeon's office. Do not use a blade to shave the surgical area 1 week before surgery. It is okay to use a clean electric clippers up to 24 hours before surgery. Do not apply any lotions, creams, including makeup, cologne, deodorant, or perfumes after showering on the day of your surgery. Do not use dry shampoo, hair spray, hair gel, or any type of hair products.     No contact lenses, eye make-up, or artificial eyelashes. Remove nail polish, including gel polish, and any artificial, gel, or acrylic nails if possible. Remove all jewelry including rings and body piercing jewelry.     Wear causal clothing that is easy to take on and off. Consider your type of surgery.    Keep any valuables, jewelry, piercings at home. Please bring any  specially ordered equipment (sling, braces) if indicated.    Arrange for a responsible person to drive you to and from the hospital on the day of your surgery. Please confirm the visitor policy for the day of your procedure when you receive your phone call with an arrival time.     Call the surgeon's office with any new illnesses, exposures, or additional questions prior to surgery.    Please reference your “My Surgical Experience Booklet” for additional information to prepare for your upcoming surgery.

## 2024-11-21 NOTE — TELEPHONE ENCOUNTER
Attempted to call patient and speak to them directly as they need to be informed of Dr. Herzog' note. Call rang multiple times then stated the call could not be completed as dialed.       ----- Message from Keegan Herzog MD sent at 11/21/2024  9:26 AM EST -----  Please let Roby know that I have sent him both Cipro and nitrofurantoin to be started 7 days prior to his upcoming suprapubic prostatectomy based on preoperative cultures, he does need both of these medications please

## 2024-11-21 NOTE — TELEPHONE ENCOUNTER
Please let Roby know that I have sent him both Cipro and nitrofurantoin to be started 7 days prior to his upcoming suprapubic prostatectomy based on preoperative cultures, he does need both of these medications please
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
## 2024-11-21 NOTE — TELEPHONE ENCOUNTER
Attempted to call patient and speak to them directly as they need to be informed of Dr. Herzog' note. Call rang multiple times then stated the call could not be completed as dialed.

## 2024-11-22 ENCOUNTER — CONSULT (OUTPATIENT)
Dept: FAMILY MEDICINE CLINIC | Facility: CLINIC | Age: 54
End: 2024-11-22

## 2024-11-22 VITALS
WEIGHT: 142.6 LBS | OXYGEN SATURATION: 99 % | BODY MASS INDEX: 24.34 KG/M2 | RESPIRATION RATE: 18 BRPM | SYSTOLIC BLOOD PRESSURE: 132 MMHG | TEMPERATURE: 97.7 F | HEART RATE: 79 BPM | DIASTOLIC BLOOD PRESSURE: 86 MMHG | HEIGHT: 64 IN

## 2024-11-22 DIAGNOSIS — Z01.818 PRE-OP EXAMINATION: Primary | ICD-10-CM

## 2024-11-22 LAB
BACTERIA UR CULT: ABNORMAL

## 2024-11-22 NOTE — TELEPHONE ENCOUNTER
Attempted to reach patient regarding message above. Call could not go through. Called and spoke to patient's daughter, Rafia. She states that she will ask patient to contact the office to review Dr. Herzog message. If patient does not call back, Rafia mentions he is easier to reach before noon.

## 2024-11-22 NOTE — TELEPHONE ENCOUNTER
Pt call transferred from [facility]ph[facility]POD I reviewed physician documentation/instructions and relayed to pt who stated he had just seen his PCP and he also went over medication instructions and understands.

## 2024-11-26 ENCOUNTER — OFFICE VISIT (OUTPATIENT)
Dept: UROLOGY | Facility: CLINIC | Age: 54
End: 2024-11-26

## 2024-11-26 VITALS
HEART RATE: 80 BPM | DIASTOLIC BLOOD PRESSURE: 80 MMHG | BODY MASS INDEX: 23.56 KG/M2 | SYSTOLIC BLOOD PRESSURE: 132 MMHG | HEIGHT: 64 IN | OXYGEN SATURATION: 98 % | WEIGHT: 138 LBS

## 2024-11-26 DIAGNOSIS — N40.1 BPH WITH OBSTRUCTION/LOWER URINARY TRACT SYMPTOMS: Primary | ICD-10-CM

## 2024-11-26 DIAGNOSIS — N13.8 BPH WITH OBSTRUCTION/LOWER URINARY TRACT SYMPTOMS: Primary | ICD-10-CM

## 2024-11-26 PROCEDURE — 99213 OFFICE O/P EST LOW 20 MIN: CPT
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
## 2024-11-29 ENCOUNTER — TELEPHONE (OUTPATIENT)
Age: 54
End: 2024-11-29

## 2024-11-29 NOTE — TELEPHONE ENCOUNTER
I called and spoke to patient and confirmed appt next with with DV. He wanted me to relay to Dr. Herzog and the clinical team that he does want the surgery because he knows he needs it.

## 2024-11-29 NOTE — TELEPHONE ENCOUNTER
"Per appt on 11/26/24:  We discussed the simple prostatectomy and patient even signed surgical consent. After signing surgical consent, patient then asked what surgery he was getting. Throughout the duration of this appointment we discussed a simple prostatectomy and the risks of surgery including risk of erectile dysfunction which was previously discussed as well by Dr. Herzog. Again, a Lao speaking  was utilized throughout the entire visit. Patient states he was never mentioned the risk of erectile dysfunction. Patient became very agitated and was raising his voice in Hungarian. I was unable to speak to the patient at this time because he became more and more agitated yelling, \"You aren't removing this from me, this is a part of me\". Again, I did explain the simple prostatectomy. Patient did end up walking out of the room. He states he is not getting this surgery and did want to speak with his doctor, Dr. Herzog.     Appt scheduled with Dr Herzog in cancellation spot next week, please confirm.   "

## 2024-11-29 NOTE — TELEPHONE ENCOUNTER
Pt called and stated he needed to be seen prior to his 12/9 surgery. He stated he was told he needed to be seen in the office to have a discussion to review the rules and information ahead of time. Pt is looking to come in on Tuesday or Wednesday of next week and does not want to lose the surgery date of 12/9. Please review.     Call back: [phone]

## 2024-12-02 NOTE — TELEPHONE ENCOUNTER
Spoke w/ pt/pt states he is keeping surgery on for 12/9/2024/pt has an appointment w/ Dr Herzog on 12/4/2024 in the office prior to surgery

## 2024-12-03 NOTE — H&P (VIEW-ONLY)
Name: Roby Louis      : 1970      MRN: [id number]  Encounter Provider: Keegan Herzog MD  Encounter Date: 2024   Encounter department: [facility]  :  Assessment & Plan  BPH with obstruction/lower urinary tract symptoms         Urinary retention due to benign prostatic hyperplasia         Person consulting for explanation of examination or test finding           Pre, solomon, and post operative care for robotic simple prostatectomy discussed with him in english and Dominican  All questions and concerns answered and addressed  Proceed to robotic simple prostatectomy (he has catheter dependent urinary retention, needs this procedure to become catheter free)    PSA 9 for quite some time  Likely due to littlejohn, large prostate, and urinary retention      History of Present Illness   Roby Louis is a 54 y.o. male who presents for counseling and complaints as above    The following portions of the patient's history were reviewed and updated as appropriate: allergies, current medications, past family history, past medical history, past social history, past surgical history and problem list.      Review of Systems   Constitutional: Negative.    HENT: Negative.     Eyes: Negative.    Respiratory: Negative.     Cardiovascular: Negative.    Gastrointestinal: Negative.    Endocrine: Negative.    Genitourinary: Negative.    Musculoskeletal: Negative.    Skin: Negative.    Allergic/Immunologic: Negative.    Neurological: Negative.    Hematological: Negative.    Psychiatric/Behavioral: Negative.            Objective   There were no vitals taken for this visit.    Physical Exam  Vitals and nursing note reviewed.   Constitutional:       General: He is not in acute distress.     Appearance: Normal appearance. He is well-developed. He is not ill-appearing, toxic-appearing or diaphoretic.   HENT:      Head: Normocephalic and atraumatic.   Eyes:      General: No scleral icterus.  Pulmonary:       Effort: Pulmonary effort is normal. No respiratory distress.   Abdominal:      General: There is no distension.      Palpations: Abdomen is soft.      Tenderness: There is no abdominal tenderness.   Musculoskeletal:         General: No deformity.      Cervical back: Neck supple.   Skin:     Coloration: Skin is not jaundiced or pale.   Neurological:      Mental Status: He is alert and oriented to person, place, and time. Mental status is at baseline.      Cranial Nerves: No cranial nerve deficit.      Motor: No weakness.      Coordination: Coordination normal.   Psychiatric:         Mood and Affect: Mood normal.         Behavior: Behavior normal.         Thought Content: Thought content normal.         Judgment: Judgment normal.          Results  Lab Results   Component Value Date    PSA 9.061 (H) 11/19/2024    PSA 4.0 04/05/2021     Lab Results   Component Value Date    CALCIUM 9.6 11/19/2024    K 4.4 11/19/2024    CO2 27 11/19/2024     11/19/2024    BUN 19 11/19/2024    CREATININE 1.22 11/19/2024     Lab Results   Component Value Date    WBC 8.61 11/19/2024    HGB 13.7 11/19/2024    HCT 42.3 11/19/2024    MCV 90 11/19/2024     11/19/2024       Office Urine Dip  No results found for this or any previous visit (from the past hour).]

## 2024-12-03 NOTE — PROGRESS NOTES
Name: Roby Louis      : 1970      MRN: [id number]  Encounter Provider: Keegan Herzog MD  Encounter Date: 2024   Encounter department: [facility]OLOG[facility]  :  Assessment & Plan  BPH with obstruction/lower urinary tract symptoms         Urinary retention due to benign prostatic hyperplasia         Person consulting for explanation of examination or test finding           Pre, solomon, and post operative care for robotic simple prostatectomy discussed with him in english and Bulgarian  All questions and concerns answered and addressed  Proceed to robotic simple prostatectomy (he has catheter dependent urinary retention, needs this procedure to become catheter free)    PSA 9 for quite some time  Likely due to littlejohn, large prostate, and urinary retention      History of Present Illness   Roby Louis is a 54 y.o. male who presents for counseling and complaints as above    The following portions of the patient's history were reviewed and updated as appropriate: allergies, current medications, past family history, past medical history, past social history, past surgical history and problem list.      Review of Systems   Constitutional: Negative.    HENT: Negative.     Eyes: Negative.    Respiratory: Negative.     Cardiovascular: Negative.    Gastrointestinal: Negative.    Endocrine: Negative.    Genitourinary: Negative.    Musculoskeletal: Negative.    Skin: Negative.    Allergic/Immunologic: Negative.    Neurological: Negative.    Hematological: Negative.    Psychiatric/Behavioral: Negative.            Objective   There were no vitals taken for this visit.    Physical Exam  Vitals and nursing note reviewed.   Constitutional:       General: He is not in acute distress.     Appearance: Normal appearance. He is well-developed. He is not ill-appearing, toxic-appearing or diaphoretic.   HENT:      Head: Normocephalic and atraumatic.   Eyes:      General: No scleral icterus.  Pulmonary:       Effort: Pulmonary effort is normal. No respiratory distress.   Abdominal:      General: There is no distension.      Palpations: Abdomen is soft.      Tenderness: There is no abdominal tenderness.   Musculoskeletal:         General: No deformity.      Cervical back: Neck supple.   Skin:     Coloration: Skin is not jaundiced or pale.   Neurological:      Mental Status: He is alert and oriented to person, place, and time. Mental status is at baseline.      Cranial Nerves: No cranial nerve deficit.      Motor: No weakness.      Coordination: Coordination normal.   Psychiatric:         Mood and Affect: Mood normal.         Behavior: Behavior normal.         Thought Content: Thought content normal.         Judgment: Judgment normal.          Results  Lab Results   Component Value Date    PSA 9.061 (H) 11/19/2024    PSA 4.0 04/05/2021     Lab Results   Component Value Date    CALCIUM 9.6 11/19/2024    K 4.4 11/19/2024    CO2 27 11/19/2024     11/19/2024    BUN 19 11/19/2024    CREATININE 1.22 11/19/2024     Lab Results   Component Value Date    WBC 8.61 11/19/2024    HGB 13.7 11/19/2024    HCT 42.3 11/19/2024    MCV 90 11/19/2024     11/19/2024       Office Urine Dip  No results found for this or any previous visit (from the past hour).]

## 2024-12-04 ENCOUNTER — OFFICE VISIT (OUTPATIENT)
Dept: UROLOGY | Facility: CLINIC | Age: 54
End: 2024-12-04

## 2024-12-04 VITALS
HEART RATE: 81 BPM | SYSTOLIC BLOOD PRESSURE: 130 MMHG | OXYGEN SATURATION: 99 % | HEIGHT: 64 IN | WEIGHT: 138 LBS | BODY MASS INDEX: 23.56 KG/M2 | DIASTOLIC BLOOD PRESSURE: 74 MMHG

## 2024-12-04 DIAGNOSIS — Z71.2 PERSON CONSULTING FOR EXPLANATION OF EXAMINATION OR TEST FINDING: ICD-10-CM

## 2024-12-04 DIAGNOSIS — N40.1 URINARY RETENTION DUE TO BENIGN PROSTATIC HYPERPLASIA: ICD-10-CM

## 2024-12-04 DIAGNOSIS — N13.8 BPH WITH OBSTRUCTION/LOWER URINARY TRACT SYMPTOMS: Primary | ICD-10-CM

## 2024-12-04 DIAGNOSIS — R33.8 URINARY RETENTION DUE TO BENIGN PROSTATIC HYPERPLASIA: ICD-10-CM

## 2024-12-04 DIAGNOSIS — N40.1 BPH WITH OBSTRUCTION/LOWER URINARY TRACT SYMPTOMS: Primary | ICD-10-CM

## 2024-12-04 PROCEDURE — 99213 OFFICE O/P EST LOW 20 MIN: CPT | Performed by: UROLOGY

## 2024-12-09 ENCOUNTER — HOSPITAL ENCOUNTER (INPATIENT)
Facility: HOSPITAL | Age: 54
Discharge: HOME | DRG: 710 | End: 2024-12-09
Attending: UROLOGY | Admitting: UROLOGY
Payer: MEDICAID

## 2024-12-09 ENCOUNTER — TELEPHONE (OUTPATIENT)
Dept: UROLOGY | Facility: HOSPITAL | Age: 54
End: 2024-12-09

## 2024-12-09 ENCOUNTER — ANESTHESIA (OUTPATIENT)
Dept: PERIOP | Facility: HOSPITAL | Age: 54
DRG: 710 | End: 2024-12-09
Payer: MEDICAID

## 2024-12-09 DIAGNOSIS — R33.8 URINARY RETENTION DUE TO BENIGN PROSTATIC HYPERPLASIA: Primary | ICD-10-CM

## 2024-12-09 DIAGNOSIS — N40.1 URINARY RETENTION DUE TO BENIGN PROSTATIC HYPERPLASIA: Primary | ICD-10-CM

## 2024-12-09 DIAGNOSIS — N40.1 BPH WITH OBSTRUCTION/LOWER URINARY TRACT SYMPTOMS: ICD-10-CM

## 2024-12-09 DIAGNOSIS — R00.0 TACHYCARDIA: ICD-10-CM

## 2024-12-09 DIAGNOSIS — E87.1 HYPONATREMIA: ICD-10-CM

## 2024-12-09 DIAGNOSIS — N13.8 BPH WITH OBSTRUCTION/LOWER URINARY TRACT SYMPTOMS: ICD-10-CM

## 2024-12-09 LAB
ABO GROUP BLD: NORMAL
ANION GAP SERPL CALCULATED.3IONS-SCNC: 5 MMOL/L (ref 4–13)
BUN SERPL-MCNC: 19 MG/DL (ref 5–25)
CALCIUM SERPL-MCNC: 8.4 MG/DL (ref 8.4–10.2)
CHLORIDE SERPL-SCNC: 105 MMOL/L (ref 96–108)
CO2 SERPL-SCNC: 26 MMOL/L (ref 21–32)
CREAT SERPL-MCNC: 1.14 MG/DL (ref 0.6–1.3)
ERYTHROCYTE [DISTWIDTH] IN BLOOD BY AUTOMATED COUNT: 13.2 % (ref 11.6–15.1)
GFR SERPL CREATININE-BSD FRML MDRD: 71 ML/MIN/1.73SQ M
GLUCOSE P FAST SERPL-MCNC: 111 MG/DL (ref 65–99)
GLUCOSE SERPL-MCNC: 111 MG/DL (ref 65–140)
HCT VFR BLD AUTO: 36.3 % (ref 36.5–49.3)
HGB BLD-MCNC: 11.9 G/DL (ref 12–17)
MCH RBC QN AUTO: 28.3 PG (ref 26.8–34.3)
MCHC RBC AUTO-ENTMCNC: 32.8 G/DL (ref 31.4–37.4)
MCV RBC AUTO: 86 FL (ref 82–98)
PLATELET # BLD AUTO: 252 THOUSANDS/UL (ref 149–390)
PMV BLD AUTO: 9.3 FL (ref 8.9–12.7)
POTASSIUM SERPL-SCNC: 4.3 MMOL/L (ref 3.5–5.3)
RBC # BLD AUTO: 4.2 MILLION/UL (ref 3.88–5.62)
RH BLD: POSITIVE
SODIUM SERPL-SCNC: 136 MMOL/L (ref 135–147)
WBC # BLD AUTO: 7.15 THOUSAND/UL (ref 4.31–10.16)

## 2024-12-09 PROCEDURE — NC001 PR NO CHARGE

## 2024-12-09 PROCEDURE — 55867 LAPS SURG PRST8ECT SMPL STOT: CPT | Performed by: PHYSICIAN ASSISTANT

## 2024-12-09 PROCEDURE — 8E0W4CZ ROBOTIC ASSISTED PROCEDURE OF TRUNK REGION, PERCUTANEOUS ENDOSCOPIC APPROACH: ICD-10-PCS | Performed by: UROLOGY

## 2024-12-09 PROCEDURE — 80048 BASIC METABOLIC PNL TOTAL CA: CPT | Performed by: UROLOGY

## 2024-12-09 PROCEDURE — 55867 LAPS SURG PRST8ECT SMPL STOT: CPT | Performed by: UROLOGY

## 2024-12-09 PROCEDURE — 85027 COMPLETE CBC AUTOMATED: CPT | Performed by: UROLOGY

## 2024-12-09 PROCEDURE — NC001 PR NO CHARGE: Performed by: PHYSICIAN ASSISTANT

## 2024-12-09 PROCEDURE — 88307 TISSUE EXAM BY PATHOLOGIST: CPT | Performed by: PATHOLOGY

## 2024-12-09 PROCEDURE — 0VT04ZZ RESECTION OF PROSTATE, PERCUTANEOUS ENDOSCOPIC APPROACH: ICD-10-PCS | Performed by: UROLOGY

## 2024-12-09 RX ORDER — ONDANSETRON 4 MG/1
4 TABLET, ORALLY DISINTEGRATING ORAL EVERY 6 HOURS PRN
Status: DISCONTINUED | OUTPATIENT
Start: 2024-12-09 | End: 2024-12-16 | Stop reason: HOSPADM

## 2024-12-09 RX ORDER — LISINOPRIL 20 MG/1
40 TABLET ORAL DAILY
Status: DISCONTINUED | OUTPATIENT
Start: 2024-12-10 | End: 2024-12-12

## 2024-12-09 RX ORDER — CEFAZOLIN SODIUM 1 G/50ML
1000 SOLUTION INTRAVENOUS ONCE
Status: COMPLETED | OUTPATIENT
Start: 2024-12-09 | End: 2024-12-09

## 2024-12-09 RX ORDER — TRANEXAMIC ACID 10 MG/ML
INJECTION, SOLUTION INTRAVENOUS AS NEEDED
Status: DISCONTINUED | OUTPATIENT
Start: 2024-12-09 | End: 2024-12-09

## 2024-12-09 RX ORDER — OXYCODONE HYDROCHLORIDE 5 MG/1
5 TABLET ORAL EVERY 4 HOURS PRN
Refills: 0 | Status: DISCONTINUED | OUTPATIENT
Start: 2024-12-09 | End: 2024-12-16 | Stop reason: HOSPADM

## 2024-12-09 RX ORDER — KETAMINE HCL IN NACL, ISO-OSM 100MG/10ML
SYRINGE (ML) INJECTION AS NEEDED
Status: DISCONTINUED | OUTPATIENT
Start: 2024-12-09 | End: 2024-12-09

## 2024-12-09 RX ORDER — ACETAMINOPHEN 500 MG
500 TABLET ORAL EVERY 6 HOURS
Qty: 20 TABLET | Refills: 0 | Status: SHIPPED | OUTPATIENT
Start: 2024-12-09 | End: 2024-12-14

## 2024-12-09 RX ORDER — ONDANSETRON 2 MG/ML
INJECTION INTRAMUSCULAR; INTRAVENOUS AS NEEDED
Status: DISCONTINUED | OUTPATIENT
Start: 2024-12-09 | End: 2024-12-09

## 2024-12-09 RX ORDER — DEXAMETHASONE SODIUM PHOSPHATE 10 MG/ML
INJECTION, SOLUTION INTRAMUSCULAR; INTRAVENOUS AS NEEDED
Status: DISCONTINUED | OUTPATIENT
Start: 2024-12-09 | End: 2024-12-09

## 2024-12-09 RX ORDER — FENTANYL CITRATE/PF 50 MCG/ML
25 SYRINGE (ML) INJECTION
Status: COMPLETED | OUTPATIENT
Start: 2024-12-09 | End: 2024-12-09

## 2024-12-09 RX ORDER — MAGNESIUM HYDROXIDE 1200 MG/15ML
3000 LIQUID ORAL CONTINUOUS
Status: DISCONTINUED | OUTPATIENT
Start: 2024-12-09 | End: 2024-12-11

## 2024-12-09 RX ORDER — ACETAMINOPHEN 325 MG/1
650 TABLET ORAL EVERY 4 HOURS PRN
Status: DISCONTINUED | OUTPATIENT
Start: 2024-12-09 | End: 2024-12-10

## 2024-12-09 RX ORDER — MIDAZOLAM HYDROCHLORIDE 2 MG/2ML
INJECTION, SOLUTION INTRAMUSCULAR; INTRAVENOUS AS NEEDED
Status: DISCONTINUED | OUTPATIENT
Start: 2024-12-09 | End: 2024-12-09

## 2024-12-09 RX ORDER — HYDROMORPHONE HCL/PF 1 MG/ML
SYRINGE (ML) INJECTION AS NEEDED
Status: DISCONTINUED | OUTPATIENT
Start: 2024-12-09 | End: 2024-12-09

## 2024-12-09 RX ORDER — PROPOFOL 10 MG/ML
INJECTION, EMULSION INTRAVENOUS AS NEEDED
Status: DISCONTINUED | OUTPATIENT
Start: 2024-12-09 | End: 2024-12-09

## 2024-12-09 RX ORDER — FENTANYL CITRATE 50 UG/ML
INJECTION, SOLUTION INTRAMUSCULAR; INTRAVENOUS AS NEEDED
Status: DISCONTINUED | OUTPATIENT
Start: 2024-12-09 | End: 2024-12-09

## 2024-12-09 RX ORDER — ROCURONIUM BROMIDE 10 MG/ML
INJECTION, SOLUTION INTRAVENOUS AS NEEDED
Status: DISCONTINUED | OUTPATIENT
Start: 2024-12-09 | End: 2024-12-09

## 2024-12-09 RX ORDER — LIDOCAINE HYDROCHLORIDE 20 MG/ML
INJECTION, SOLUTION EPIDURAL; INFILTRATION; INTRACAUDAL; PERINEURAL AS NEEDED
Status: DISCONTINUED | OUTPATIENT
Start: 2024-12-09 | End: 2024-12-09

## 2024-12-09 RX ORDER — ONDANSETRON 2 MG/ML
4 INJECTION INTRAMUSCULAR; INTRAVENOUS ONCE AS NEEDED
Status: DISCONTINUED | OUTPATIENT
Start: 2024-12-09 | End: 2024-12-09 | Stop reason: HOSPADM

## 2024-12-09 RX ORDER — ACETAMINOPHEN 10 MG/ML
1000 INJECTION, SOLUTION INTRAVENOUS ONCE
Status: COMPLETED | OUTPATIENT
Start: 2024-12-09 | End: 2024-12-09

## 2024-12-09 RX ORDER — SODIUM CHLORIDE, SODIUM LACTATE, POTASSIUM CHLORIDE, CALCIUM CHLORIDE 600; 310; 30; 20 MG/100ML; MG/100ML; MG/100ML; MG/100ML
INJECTION, SOLUTION INTRAVENOUS CONTINUOUS PRN
Status: DISCONTINUED | OUTPATIENT
Start: 2024-12-09 | End: 2024-12-09

## 2024-12-09 RX ORDER — DEXAMETHASONE SODIUM PHOSPHATE 10 MG/ML
8 INJECTION, SOLUTION INTRAMUSCULAR; INTRAVENOUS ONCE AS NEEDED
Status: DISCONTINUED | OUTPATIENT
Start: 2024-12-09 | End: 2024-12-09 | Stop reason: HOSPADM

## 2024-12-09 RX ORDER — HYDROMORPHONE HCL/PF 1 MG/ML
0.5 SYRINGE (ML) INJECTION
Status: COMPLETED | OUTPATIENT
Start: 2024-12-09 | End: 2024-12-09

## 2024-12-09 RX ORDER — BUPIVACAINE HYDROCHLORIDE 2.5 MG/ML
INJECTION, SOLUTION EPIDURAL; INFILTRATION; INTRACAUDAL AS NEEDED
Status: DISCONTINUED | OUTPATIENT
Start: 2024-12-09 | End: 2024-12-09 | Stop reason: HOSPADM

## 2024-12-09 RX ORDER — DICLOFENAC POTASSIUM 50 MG/1
50 TABLET, FILM COATED ORAL 2 TIMES DAILY
Qty: 10 TABLET | Refills: 0 | Status: SHIPPED | OUTPATIENT
Start: 2024-12-09 | End: 2024-12-14

## 2024-12-09 RX ORDER — HYDROMORPHONE HCL IN WATER/PF 6 MG/30 ML
0.2 PATIENT CONTROLLED ANALGESIA SYRINGE INTRAVENOUS EVERY 2 HOUR PRN
Refills: 0 | Status: DISCONTINUED | OUTPATIENT
Start: 2024-12-09 | End: 2024-12-16 | Stop reason: HOSPADM

## 2024-12-09 RX ORDER — SODIUM CHLORIDE 9 MG/ML
INJECTION, SOLUTION INTRAVENOUS CONTINUOUS PRN
Status: DISCONTINUED | OUTPATIENT
Start: 2024-12-09 | End: 2024-12-09

## 2024-12-09 RX ORDER — ACETAMINOPHEN 325 MG/1
975 TABLET ORAL ONCE
Status: COMPLETED | OUTPATIENT
Start: 2024-12-09 | End: 2024-12-09

## 2024-12-09 RX ORDER — SODIUM CHLORIDE, SODIUM LACTATE, POTASSIUM CHLORIDE, CALCIUM CHLORIDE 600; 310; 30; 20 MG/100ML; MG/100ML; MG/100ML; MG/100ML
125 INJECTION, SOLUTION INTRAVENOUS CONTINUOUS
Status: DISCONTINUED | OUTPATIENT
Start: 2024-12-09 | End: 2024-12-11

## 2024-12-09 RX ADMIN — ONDANSETRON 4 MG: 4 TABLET, ORALLY DISINTEGRATING ORAL at 23:04

## 2024-12-09 RX ADMIN — ACETAMINOPHEN 650 MG: 325 TABLET, FILM COATED ORAL at 23:04

## 2024-12-09 RX ADMIN — SODIUM CHLORIDE, SODIUM LACTATE, POTASSIUM CHLORIDE, AND CALCIUM CHLORIDE 125 ML/HR: .6; .31; .03; .02 INJECTION, SOLUTION INTRAVENOUS at 23:07

## 2024-12-09 RX ADMIN — SODIUM CHLORIDE, SODIUM LACTATE, POTASSIUM CHLORIDE, AND CALCIUM CHLORIDE: .6; .31; .03; .02 INJECTION, SOLUTION INTRAVENOUS at 12:16

## 2024-12-09 RX ADMIN — FENTANYL CITRATE 25 MCG: 50 INJECTION INTRAMUSCULAR; INTRAVENOUS at 15:49

## 2024-12-09 RX ADMIN — SODIUM CHLORIDE, SODIUM LACTATE, POTASSIUM CHLORIDE, AND CALCIUM CHLORIDE 125 ML/HR: .6; .31; .03; .02 INJECTION, SOLUTION INTRAVENOUS at 18:49

## 2024-12-09 RX ADMIN — HYDROMORPHONE HYDROCHLORIDE 0.5 MG: 1 INJECTION, SOLUTION INTRAMUSCULAR; INTRAVENOUS; SUBCUTANEOUS at 16:35

## 2024-12-09 RX ADMIN — HYDROMORPHONE HYDROCHLORIDE 0.5 MG: 1 INJECTION, SOLUTION INTRAMUSCULAR; INTRAVENOUS; SUBCUTANEOUS at 16:10

## 2024-12-09 RX ADMIN — PROPOFOL 200 MG: 10 INJECTION, EMULSION INTRAVENOUS at 12:28

## 2024-12-09 RX ADMIN — MIDAZOLAM HYDROCHLORIDE 2 MG: 1 INJECTION, SOLUTION INTRAMUSCULAR; INTRAVENOUS at 12:20

## 2024-12-09 RX ADMIN — HYDROMORPHONE HYDROCHLORIDE 0.5 MG: 1 INJECTION, SOLUTION INTRAMUSCULAR; INTRAVENOUS; SUBCUTANEOUS at 15:00

## 2024-12-09 RX ADMIN — SODIUM CHLORIDE, SODIUM LACTATE, POTASSIUM CHLORIDE, CALCIUM CHLORIDE: 600; 310; 30; 20 INJECTION, SOLUTION INTRAVENOUS at 13:43

## 2024-12-09 RX ADMIN — ACETAMINOPHEN 975 MG: 325 TABLET, FILM COATED ORAL at 10:42

## 2024-12-09 RX ADMIN — LIDOCAINE HYDROCHLORIDE 100 MG: 20 INJECTION, SOLUTION EPIDURAL; INFILTRATION; INTRACAUDAL at 12:28

## 2024-12-09 RX ADMIN — FENTANYL CITRATE 100 MCG: 50 INJECTION INTRAMUSCULAR; INTRAVENOUS at 12:28

## 2024-12-09 RX ADMIN — FENTANYL CITRATE 25 MCG: 50 INJECTION INTRAMUSCULAR; INTRAVENOUS at 16:06

## 2024-12-09 RX ADMIN — TRANEXAMIC ACID 1000 MG: 10 INJECTION, SOLUTION INTRAVENOUS at 13:31

## 2024-12-09 RX ADMIN — OXYCODONE HYDROCHLORIDE 5 MG: 5 TABLET ORAL at 21:02

## 2024-12-09 RX ADMIN — Medication 20 MG: at 12:28

## 2024-12-09 RX ADMIN — ROCURONIUM 50 MG: 50 INJECTION, SOLUTION INTRAVENOUS at 12:28

## 2024-12-09 RX ADMIN — Medication 30 MG: at 13:44

## 2024-12-09 RX ADMIN — ONDANSETRON 4 MG: 2 INJECTION, SOLUTION INTRAMUSCULAR; INTRAVENOUS at 12:29

## 2024-12-09 RX ADMIN — CEFAZOLIN SODIUM 1000 MG: 1 SOLUTION INTRAVENOUS at 12:26

## 2024-12-09 RX ADMIN — ROCURONIUM 20 MG: 50 INJECTION, SOLUTION INTRAVENOUS at 12:52

## 2024-12-09 RX ADMIN — DEXAMETHASONE SODIUM PHOSPHATE 10 MG: 10 INJECTION INTRAMUSCULAR; INTRAVENOUS at 12:28

## 2024-12-09 RX ADMIN — FENTANYL CITRATE 25 MCG: 50 INJECTION INTRAMUSCULAR; INTRAVENOUS at 16:00

## 2024-12-09 RX ADMIN — HYDROMORPHONE HYDROCHLORIDE 0.5 MG: 1 INJECTION, SOLUTION INTRAMUSCULAR; INTRAVENOUS; SUBCUTANEOUS at 16:15

## 2024-12-09 RX ADMIN — SUGAMMADEX 200 MG: 100 INJECTION, SOLUTION INTRAVENOUS at 15:12

## 2024-12-09 RX ADMIN — ACETAMINOPHEN 1000 MG: 10 INJECTION INTRAVENOUS at 16:45

## 2024-12-09 RX ADMIN — ROCURONIUM 20 MG: 50 INJECTION, SOLUTION INTRAVENOUS at 14:22

## 2024-12-09 RX ADMIN — SODIUM CHLORIDE: 0.9 INJECTION, SOLUTION INTRAVENOUS at 12:33

## 2024-12-09 RX ADMIN — FENTANYL CITRATE 25 MCG: 50 INJECTION INTRAMUSCULAR; INTRAVENOUS at 15:54

## 2024-12-09 RX ADMIN — HYDROMORPHONE HYDROCHLORIDE 0.5 MG: 1 INJECTION, SOLUTION INTRAMUSCULAR; INTRAVENOUS; SUBCUTANEOUS at 16:43

## 2024-12-09 NOTE — ED ADULT TRIAGE NOTE - PRO INTERPRETER NEED 2
Ambulatory with parents in triage, cold symptoms this past week, crusted eyes throughout the day today, woke up tonight with LEFT ear, ibuprofen at 1830.  
English

## 2024-12-09 NOTE — ANESTHESIA POSTPROCEDURE EVALUATION
Post-Op Assessment Note    CV Status:  Stable  Pain Score: 0    Pain management: adequate       Mental Status:  Awake and alert   Hydration Status:  Stable   PONV Controlled:  None   Airway Patency:  Patent and adequate     Post Op Vitals Reviewed: Yes    No anethesia notable event occurred.    Staff: CRNA, Anesthesiologist           Last Filed PACU Vitals:  Vitals Value Taken Time   Temp 98.1    Pulse 95    /102  pt moving    Resp 16    SpO2 100        Modified Artie:  No data recorded

## 2024-12-09 NOTE — ANESTHESIA PREPROCEDURE EVALUATION
Procedure:  PROSTATECTOMY,SUPRAPUBIC LAPAROSCOPIC WITH ROBOTICS (Abdomen)    Relevant Problems   CARDIO   (+) Hypertension   (+) Murmur      /RENAL   (+) BPH with obstruction/lower urinary tract symptoms      MUSCULOSKELETAL   (+) Chronic right-sided low back pain with right-sided sciatica      NEURO/PSYCH   (+) Chronic right shoulder pain   (+) Chronic right-sided low back pain with right-sided sciatica      Urinary   (+) Urinary retention due to benign prostatic hyperplasia      Lab Results   Component Value Date    WBC 8.61 11/19/2024    HGB 13.7 11/19/2024    HCT 42.3 11/19/2024    MCV 90 11/19/2024     11/19/2024     Lab Results   Component Value Date    SODIUM 138 11/19/2024    K 4.4 11/19/2024     11/19/2024    CO2 27 11/19/2024    BUN 19 11/19/2024    CREATININE 1.22 11/19/2024    GLUC 100 08/24/2023    CALCIUM 9.6 11/19/2024     Lab Results   Component Value Date    INR 1.07 11/19/2024    PROTIME 14.2 11/19/2024     Lab Results   Component Value Date    HGBA1C 5.6 11/19/2024          Physical Exam    Airway    Mallampati score: II  TM Distance: >3 FB  Neck ROM: full     Dental   No notable dental hx     Cardiovascular      Pulmonary      Other Findings        Anesthesia Plan  ASA Score- 2     Anesthesia Type- general with ASA Monitors.         Additional Monitors:     Airway Plan: ETT.           Plan Factors-    Chart reviewed. EKG reviewed.  Existing labs reviewed. Patient summary reviewed.          Obstructive sleep apnea risk education given perioperatively.        Induction- intravenous.    Postoperative Plan- Plan for postoperative opioid use. Planned trial extubation    Perioperative Resuscitation Plan - Level 1 - Full Code.       Informed Consent- Anesthetic plan and risks discussed with patient.  I personally reviewed this patient with the CRNA. Discussed and agreed on the Anesthesia Plan with the CRNA..

## 2024-12-09 NOTE — DISCHARGE INSTR - AVS FIRST PAGE
Roby,    Today you had a robot-assisted laparoscopic simple prostatectomy for treatment of your bladder outlet obstruction.  I suspect that you are going to urinate much better now that you are central and transition zone of the prostate have been removed.  We will arrange for your Granda catheter to be removed in the office setting.    Please note that your bladder was quite thickened.  As such she may have urgency and frequency along with some leakage of urine until your bladder is able to reconfigure somewhat after today's procedure.  If you have ongoing urgency and frequency with leakage going forward we can look into medications to relax the bladder or consider procedures such as cystoscopy with botulinum toxin injection versus InterStim placement if you have ongoing bother.  Apically, urgency and frequency does improve with the passing of time after simple prostatectomy    I wish you a rapid and uneventful recovery,  Dr. Herzog

## 2024-12-09 NOTE — OP NOTE
OPERATIVE REPORT  PATIENT NAME: Roby Louis    :  1970  MRN: [id number]  Pt Location: AN OR ROOM 04    SURGERY DATE: 2024    Surgeons and Role:     * Keegan Herzog MD - Primary     * Tracy Scanlon PA-C - Assisting    Please note that the physician assistant was necessary for changing robotic instruments and exposing the appropriate anatomy for successful completion of today's case    Preop Diagnosis:  BPH with obstruction/lower urinary tract symptoms [N40.1, N13.8]  Urinary retention due to benign prostatic hyperplasia [N40.1, R33.8]    Post-Op Diagnosis Codes:     * BPH with obstruction/lower urinary tract symptoms [N40.1, N13.8]     * Urinary retention due to benign prostatic hyperplasia [N40.1, R33.8]    Procedure(s):  ROBOTIC LAPAROSCOPIC SUPRAPUBIC PROSTATECTOMY    Specimen(s):  ID Type Source Tests Collected by Time Destination   1 : prostate adenoma Tissue Prostate TISSUE EXAM Keegan Herzog MD 2024  1:54 PM        Estimated Blood Loss:   150 mL    Drains:  Closed/Suction Drain LLQ Bulb 19 Fr. (Active)   Number of days: 0       NG/OG/Enteral Tube Orogastric 18 Fr (Active)   Number of days: 0       Urethral Catheter Latex 16 Fr. (Active)   Number of days: 209       Urethral Catheter Three way;Non-latex;Other (Comment) 24 Fr. (Active)   Number of days: 0       Anesthesia Type:   General    Operative Indications:  BPH with obstruction/lower urinary tract symptoms [N40.1, N13.8]  Urinary retention due to benign prostatic hyperplasia [N40.1, R33.8]  Catheter dependent urinary retention    Operative Findings:  Trilobar hypertrophy with significant intravesical protrusion of the prostate.  Successful robot-assisted laparoscopic simple prostatectomy with removal of the central and transition zone with preservation of the prostatic capsule and posterior/peripheral zone of the prostate.  Come Australian bladder neck reconstruction performed.  Watertight anastomosis at the end of today's case.   Continuous bladder irrigation is running clear at the end of today's procedure      Complications:   None    Procedure and Technique:    This is a 54-year-old gentleman with catheter dependent urinary retention.  He has been wearing a catheter for over 1 year.  He was counseled extensively in both Malaysian and English on robot-assisted laparoscopic simple prostatectomy with all indicated procedures.  He did understand prior to surgery that there would potentially be some change in his sexual and urinary function after today's procedure and also understood the risks of surgery which include neck contracture, urethral stricture, and the risks of infection, bleeding, pain, damage to surrounding structures, need for additional procedures, risk of failure of the procedure, risk of anesthesia, risk of positioning complications including neurapraxia, chronic pain, and paralysis as well as risk of rhabdomyolysis and compartment syndrome.  Risk of deep venous thrombosis and venous thromboembolism as well as pneumonia and other potential unpredictable complications were also discussed with the patient.    He was seen by the perioperative nursing and anesthesia team and urology team on the day of surgery.  He was brought to the operating room and placed under general endotracheal anesthesia on top of the pink pad positioning system.  All pressure points were padded.  He was prepared and draped in usual fashion.  A Granda catheter was placed in sterile fashion.  A timeout confirm the proper patient position and procedure.    A spring-loaded needle device was used to establish pneumoperitoneum to 15 mmHg which was later lowered to 10 mmHg, our working pressure today.    Ports were placed as is customary for robotic pelvic surgery with a total of 4, 8 mm robotic ports and a 12 mm and 5 mm assistant port.    Adhesions of the sigmoid colon were taken down as these were adhesed to the left pelvic sidewall and posterior wall of the  bladder.  A area of the sigmoid colon was then reinforced with a figure-of-eight 2-0 Vicryl suture given that the serosa in this area appeared quite inflamed from having been attached to the back of the bladder.  No spillage of bowel contents was noted.  Later in today's case this area appeared normal and the Lembert suture was intact.    The bladder was filled with irrigation and then opened in the midline vertically.  Stay sutures were placed on the right and left wings of the bladder to provide lateral traction for successful completion of today's case.  The area of the trigone was identified.  A stay suture of 0 Vicryl was placed in a figure-of-eight fashion and the prostate adenoma was retracted cephalad.  A circumferential incision was then made with use of cutting current down onto the prostate adenoma.  A combination of blunt and sharp dissection along with monopolar and bipolar electrocautery where needed was used to dissect circumferentially around the prostate adenoma to the area of the shoulders of the adenoma and apex of the prostate.    The urethra was then divided and the specimen, this being prostate adenoma was placed in the Endo Catch bag for later retrieval.    Bipolar electrocautery was used over the area of the vascular pedicles to control some arterial bleeders in this area.  A matrix of gelatin particles and thrombin was then applied to the entire resection capsule for additional hemostasis.    A 3 OV lock suture was placed posteriorly at the bladder neck and then running clockwise and counterclockwise respectively to perform a circumferential bladder neck reconstruction which was helpful for imbricating the prostatic capsule and also to help further effect hemostasis.    Following this the final Granda catheter was placed, this being a 24 Serbian silicone three-way catheter.  A dual layer closure incorporating the detrusor muscle as well as the mucosa of the bladder was then performed with  V-Loc suture starting anteriorly and posteriorly and meeting in the middle.  The bladder is seen to be water fast when irrigated to its capacity of 280 mL (following this urine is seen to drain around the Granda catheter likely due to the patient's very hypertrophied and stiff bladder wall).    The patient was started on gentle continuous bladder irrigation with clear effluent.    The specimen was removed by extending in a lateral fashion the supraumbilical incision, this was handed off as prostate adenoma.  The fascia was closed with a PDS suture.  A close suction drain had previously been placed and the ports were looked out.  The skin was closed with 4 Monocryl suture and skin glue.    He was then awakened and taken to the recovery room.    A postoperative debrief was performed.       I was present for the entire procedure. and A qualified resident physician was not available.    Patient Disposition:  PACU     Plan: CBI overnight, home with Granda catheter tomorrow.  He will have this removed in the office setting.  I do expect him to have some urgency and frequency of urination along with blood in the urine as he recovers.  We will review his final pathology in the office setting in some weeks             SIGNATURE: Keegan Herzog MD  DATE: December 9, 2024  TIME: 3:17 PM

## 2024-12-09 NOTE — TELEPHONE ENCOUNTER
Please arrange trial of void for 10 to 14 days from now, he can follow-up as scheduled with me for review of his pathology

## 2024-12-09 NOTE — INTERVAL H&P NOTE
H&P reviewed. After examining the patient I find no changes in the patients condition since the H&P had been written.    Vitals:    12/09/24 1028   BP: 132/90   Pulse: 79   Resp: 20   Temp: 98.5 °F (36.9 °C)   SpO2: 98%   Proceed to robotic simple prostatectomy and all indicated procedures

## 2024-12-10 LAB
ABO GROUP BLD BPU: NORMAL
ABO GROUP BLD BPU: NORMAL
ANION GAP SERPL CALCULATED.3IONS-SCNC: 3 MMOL/L (ref 4–13)
BPU ID: NORMAL
BPU ID: NORMAL
BUN SERPL-MCNC: 17 MG/DL (ref 5–25)
CALCIUM SERPL-MCNC: 8.6 MG/DL (ref 8.4–10.2)
CHLORIDE SERPL-SCNC: 101 MMOL/L (ref 96–108)
CO2 SERPL-SCNC: 29 MMOL/L (ref 21–32)
CREAT SERPL-MCNC: 1.05 MG/DL (ref 0.6–1.3)
CROSSMATCH: NORMAL
CROSSMATCH: NORMAL
ERYTHROCYTE [DISTWIDTH] IN BLOOD BY AUTOMATED COUNT: 13.1 % (ref 11.6–15.1)
GFR SERPL CREATININE-BSD FRML MDRD: 79 ML/MIN/1.73SQ M
GLUCOSE SERPL-MCNC: 149 MG/DL (ref 65–140)
HCT VFR BLD AUTO: 35.1 % (ref 36.5–49.3)
HGB BLD-MCNC: 11.8 G/DL (ref 12–17)
MCH RBC QN AUTO: 28.4 PG (ref 26.8–34.3)
MCHC RBC AUTO-ENTMCNC: 33.6 G/DL (ref 31.4–37.4)
MCV RBC AUTO: 85 FL (ref 82–98)
PLATELET # BLD AUTO: 258 THOUSANDS/UL (ref 149–390)
PMV BLD AUTO: 9.5 FL (ref 8.9–12.7)
POTASSIUM SERPL-SCNC: 3.8 MMOL/L (ref 3.5–5.3)
RBC # BLD AUTO: 4.15 MILLION/UL (ref 3.88–5.62)
SODIUM SERPL-SCNC: 133 MMOL/L (ref 135–147)
UNIT DISPENSE STATUS: NORMAL
UNIT DISPENSE STATUS: NORMAL
UNIT PRODUCT CODE: NORMAL
UNIT PRODUCT CODE: NORMAL
UNIT PRODUCT VOLUME: 350 ML
UNIT PRODUCT VOLUME: 350 ML
UNIT RH: NORMAL
UNIT RH: NORMAL
WBC # BLD AUTO: 15.78 THOUSAND/UL (ref 4.31–10.16)

## 2024-12-10 PROCEDURE — 85027 COMPLETE CBC AUTOMATED: CPT | Performed by: UROLOGY

## 2024-12-10 PROCEDURE — 80048 BASIC METABOLIC PNL TOTAL CA: CPT | Performed by: UROLOGY

## 2024-12-10 PROCEDURE — 99024 POSTOP FOLLOW-UP VISIT: CPT | Performed by: NURSE PRACTITIONER

## 2024-12-10 RX ORDER — BUTALBITAL, ACETAMINOPHEN AND CAFFEINE 50; 325; 40 MG/1; MG/1; MG/1
1 TABLET ORAL EVERY 4 HOURS PRN
Status: DISCONTINUED | OUTPATIENT
Start: 2024-12-10 | End: 2024-12-16 | Stop reason: HOSPADM

## 2024-12-10 RX ORDER — HEPARIN SODIUM 5000 [USP'U]/ML
5000 INJECTION, SOLUTION INTRAVENOUS; SUBCUTANEOUS EVERY 8 HOURS SCHEDULED
Status: DISCONTINUED | OUTPATIENT
Start: 2024-12-10 | End: 2024-12-10

## 2024-12-10 RX ORDER — METOPROLOL TARTRATE 1 MG/ML
5 INJECTION, SOLUTION INTRAVENOUS EVERY 6 HOURS PRN
Status: DISCONTINUED | OUTPATIENT
Start: 2024-12-10 | End: 2024-12-16 | Stop reason: HOSPADM

## 2024-12-10 RX ORDER — ACETAMINOPHEN 325 MG/1
650 TABLET ORAL EVERY 6 HOURS PRN
Status: DISCONTINUED | OUTPATIENT
Start: 2024-12-10 | End: 2024-12-16 | Stop reason: HOSPADM

## 2024-12-10 RX ORDER — METOCLOPRAMIDE HYDROCHLORIDE 5 MG/ML
10 INJECTION INTRAMUSCULAR; INTRAVENOUS ONCE
Status: COMPLETED | OUTPATIENT
Start: 2024-12-10 | End: 2024-12-10

## 2024-12-10 RX ORDER — CEPHALEXIN 500 MG/1
500 CAPSULE ORAL EVERY 6 HOURS SCHEDULED
Status: DISCONTINUED | OUTPATIENT
Start: 2024-12-10 | End: 2024-12-11

## 2024-12-10 RX ADMIN — BUTALBITAL, ACETAMINOPHEN, AND CAFFEINE 1 TABLET: 325; 50; 40 TABLET ORAL at 21:53

## 2024-12-10 RX ADMIN — ONDANSETRON 4 MG: 4 TABLET, ORALLY DISINTEGRATING ORAL at 18:12

## 2024-12-10 RX ADMIN — CEPHALEXIN 500 MG: 500 CAPSULE ORAL at 17:37

## 2024-12-10 RX ADMIN — METOCLOPRAMIDE 10 MG: 5 INJECTION, SOLUTION INTRAMUSCULAR; INTRAVENOUS at 04:05

## 2024-12-10 RX ADMIN — CEPHALEXIN 500 MG: 500 CAPSULE ORAL at 12:00

## 2024-12-10 RX ADMIN — SODIUM CHLORIDE, SODIUM LACTATE, POTASSIUM CHLORIDE, AND CALCIUM CHLORIDE 125 ML/HR: .6; .31; .03; .02 INJECTION, SOLUTION INTRAVENOUS at 07:16

## 2024-12-10 RX ADMIN — CEPHALEXIN 500 MG: 500 CAPSULE ORAL at 23:18

## 2024-12-10 RX ADMIN — METOPROLOL TARTRATE 5 MG: 1 INJECTION, SOLUTION INTRAVENOUS at 01:31

## 2024-12-10 RX ADMIN — LISINOPRIL 40 MG: 20 TABLET ORAL at 09:07

## 2024-12-10 RX ADMIN — OXYCODONE HYDROCHLORIDE 5 MG: 5 TABLET ORAL at 04:04

## 2024-12-10 RX ADMIN — SODIUM CHLORIDE, SODIUM LACTATE, POTASSIUM CHLORIDE, AND CALCIUM CHLORIDE 1000 ML: .6; .31; .03; .02 INJECTION, SOLUTION INTRAVENOUS at 15:36

## 2024-12-10 RX ADMIN — SODIUM CHLORIDE, SODIUM LACTATE, POTASSIUM CHLORIDE, AND CALCIUM CHLORIDE 125 ML/HR: .6; .31; .03; .02 INJECTION, SOLUTION INTRAVENOUS at 23:48

## 2024-12-10 NOTE — UTILIZATION REVIEW
Initial Clinical Review    Elective outpatient procedure, converted to inpatient admission due to need for pain control. Monitoring tolerance to diet advancement .     Elective outpt  surgical procedure  Age/Sex: 54 y.o. male with hx BPH with obstruction/lower urinary tract symptoms   Surgery Date: 12/9/2024 12:52 PM   Procedure: ROBOTIC LAPAROSCOPIC SUPRAPUBIC PROSTATECTOMY   Anesthesia: General   Operative Findings: Trilobar hypertrophy with significant intravesical protrusion of the prostate.  Successful robot-assisted laparoscopic simple prostatectomy with removal of the central and transition zone with preservation of the prostatic capsule and posterior/peripheral zone of the prostate.  Come Brazilian bladder neck reconstruction performed.  Watertight anastomosis at the end of today's case.  Continuous bladder irrigation is running clear at the end of today's procedure    ml       POD#1 Progress Note: 12/10  Converted to IP  Offers generalized somatic complaints including headache, anorexia, nausea without vomiting, and generalized weakness. Pt mainly only taking in CL due to nausea and poor appetite. IVF infusing . Pt given x 1 metoprolol overnight for elevated BP .  CBI infusing with littlejohn patent for light blush urine  . To maintain littlejohn upon d/c   . YUMI drain LLQ  patent for 15 ml overnight . On exam, Pt has  moderate tenderness in the lower abdomen and puncture sites intact.  Lower abdominal incision with Dermabond.  No signs of dehiscence. No appreciable increasing abdominal girth or distention Ordered abdominal binder  WBC 15.78 - likely reactive .  Hgb stable .  .Normal renal function. PLan- Aggressive ambulation . Clamp CBI after current bag. Monitor YUMI output. . Continue IVF . Will require an additional night stay to work on diet tolerance and pain control.      Date 12/11 Day 2    Temp 101.9 F overnight , tachycardic . Ordered CXR- neg for acute findings  , blood cx ordered today .  On po  Cephalexin . WBC 14.08 today . Pt refusing IVF ordered this am .Lower abdominal pain-/mild. Complains of headache . Abdomen with puncture sites and incision--Dermabond, clean dry and intact well-approximated without evidence of dehiscence or crepitus.  YUMI drain--scant drainage of serosanguineous--removed without incident.   Maintain littlejohn cath upon discharge with void trial scheduled for 12/23/24.  . Pt tolerating clear liquids.  Patient expresses fear/anxiety over advancing diet. Patient has verbalized increasing anxiety within the hospital environment.  Ordered prn Xanax for anxiety . If afebrile this afternoon, plan for d/c to home with recovery in familiar surroundings. . K 3.3- repletion ordered. CBC, BMP in am  if remains hospitalized .    Update temp 101.2 F @ 1400 today     Admission Orders: Date/Time/Statement:   Admission Orders (From admission, onward)       Ordered        12/10/24 1435  INPATIENT ADMISSION  Once                          Orders Placed This Encounter   Procedures    INPATIENT ADMISSION     Standing Status:   Standing     Number of Occurrences:   1     Level of Care:   Med Surg [16]     Estimated length of stay:   More than 2 Midnights     Certification:   I certify that inpatient services are medically necessary for this patient for a duration of greater than two midnights. See H&P and MD Progress Notes for additional information about the patient's course of treatment.     Diet: reg diet   Mobility: Up as cyndee  DVT Prophylaxis: SCD, ambulation     Medications/Pain Control:   Scheduled Medications:  cephalexin, 500 mg, Oral, Q6H SOCO  lisinopril, 40 mg, Oral, Daily    metoclopramide (REGLAN) injection 10 mg  Dose: 10 mg  Freq: Once Route: IV  Start: 12/10/24 0400 End: 12/10/24 0405  acetaminophen (Ofirmev) injection 1,000 mg  Dose: 1,000 mg  Freq: Once Route: IV  Last Dose: 1,000 mg (12/09/24 1645)  Start: 12/09/24 1645 End: 12/09/24 1700  potassium chloride (Klor-Con M20) CR tablet 40  mEq  Dose: 40 mEq  Freq: Once Route: PO  Start: 12/11/24 0945 End: 12/11/24 1005    Continuous IV Infusions:  lactated ringers, 125 mL/hr, Intravenous, ContinuousStart: 12/09/24 1815 End: 12/11/24 0930   sodium chloride 0.9 % infusion  Rate: 75 mL/hr Dose: 75 mL/hr  Freq: Continuous Route: IV  Indications of Use: IV Hydration  Start: 12/11/24 0930- pt refusing       PRN Meds:  butalbital-acetaminophen-caffeine, 1 tablet, Oral, Q4H PRN x1 12/10, x1 12/11  HYDROmorphone, 0.2 mg, Intravenous, Q2H PRN  lactated ringers, 1,000 mL, Intravenous, Once PRN   And  lactated ringers, 1,000 mL, Intravenous, Once PRN x1 12/10 @ 1536   metoprolol, 5 mg, Intravenous, Q6H PRNSbp > 160  x1 12/10   naloxone, 0.04 mg, Intravenous, Q1MIN PRN  ondansetron, 4 mg, Oral, Q6H PRN    x1  12/9 , x1 12/10   oxyCODONE, 2.5 mg, Oral, Q4H PRN   Or  oxyCODONE, 5 mg, Oral, Q4H PRN x1 12/9, x1 12/10   ALPRAZolam (XANAX) tablet 0.5 mg  Dose: 0.5 mg  Freq: 2 times daily PRN Route: PO  PRN Reason: anxiety  Start: 12/11/24 0929  HYDROmorphone (DILAUDID) injection 0.5 mg  Dose: 0.5 mg  Freq: Every 5 minutes PRN Route: IV  PRN Reason: Pain - PACU  PRN Comment: Breakthrough Pain. Second Line  Start: 12/09/24 1533 End: 12/09/24 1615   x2 12/9   HYDROmorphone (DILAUDID) injection 0.5 mg  Dose: 0.5 mg  Freq: Every 5 minutes PRN Route: IV  PRN Reasons: severe pain,breakthrough pain  Start: 12/09/24 1632 End: 12/09/24 1643 x2 12/9   fentaNYL (SUBLIMAZE) injection 25 mcg  Dose: 25 mcg  Freq: Every 5 minutes PRN Route: IV  PRN Reason: Pain - PACU  PRN Comment: Breakthrough - first line  Start: 12/09/24 1533 End: 12/09/24 1606 x4 12/9   acetaminophen (TYLENOL) tablet 650 mg  Dose: 650 mg  Freq: Every 4 hours PRN Route: PO  PRN Reason: mild pain  Start: 12/09/24 1824 End: 12/10/24 1307 x1 12/9   acetaminophen (TYLENOL) tablet 650 mg  Dose: 650 mg  Freq: Every 6 hours PRN Route: PO  PRN Reasons: mild pain,fever  Start: 12/10/24 1712    x2 12/11         Vital Signs  (last 3 days)       Date/Time Temp Pulse Resp BP MAP (mmHg) SpO2 O2 Flow Rate (L/min) O2 Device Cardiac (WDL) Patient Position - Orthostatic VS [place] Coma Scale Score Pain    12/11/24 0916 -- -- -- -- -- -- -- -- -- -- -- 9    12/11/24 06:52:58 99.3 °F (37.4 °C) 82 18 153/88 110 94 % -- -- -- -- -- --    12/11/24 05:07:53 100.5 °F (38.1 °C) 92 -- 126/90 102 92 % -- -- -- -- -- --    12/11/24 03:41:24 100.3 °F (37.9 °C) 82 -- -- -- 96 % -- -- -- -- -- --    12/11/24 02:42:31 101.9 °F (38.8 °C) 106 -- 124/91 102 96 % -- -- -- -- -- --    12/11/24 02:42:11 101.9 °F (38.8 °C) 115 16 124/91 102 93 % -- -- -- -- -- --    12/10/24 22:19:21 100.5 °F (38.1 °C) 97 19 152/86 108 96 % -- -- -- -- -- --    12/10/24 19:11:59 99.6 °F (37.6 °C) 74 17 155/83 107 97 % -- -- -- -- -- --    12/10/24 18:22:16 100 °F (37.8 °C) 79 -- -- -- 97 % -- -- -- -- -- --    12/10/24 17:03:12 100.3 °F (37.9 °C) 86 -- -- -- 96 % -- -- -- -- -- --    12/10/24 15:34:31 100.1 °F (37.8 °C) 88 -- -- -- 96 % -- -- -- -- -- --    12/10/24 15:07:48 100.4 °F (38 °C) 94 18 146/80 102 93 % -- -- -- -- -- --    12/10/24 14:04:05 100.2 °F (37.9 °C) 84 15 140/81 101 97 % -- -- -- -- -- --    12/10/24 1100 -- -- -- -- -- -- -- -- -- -- -- 3    12/10/24 0900 -- -- -- -- -- -- -- -- -- -- 15 5    12/10/24 07:33:09 99.5 °F (37.5 °C) 79 16 135/86 102 95 % -- -- -- -- -- --    12/10/24 04:00:11 99.2 °F (37.3 °C) 68 -- 154/94 114 97 % -- -- -- -- -- --    12/10/24 01:12:35 -- 91 -- 161/102 122 97 % -- -- -- -- -- --    12/09/24 2334 -- -- -- 160/90 -- -- -- -- -- Lying -- --    12/09/24 22:39:39 99 °F (37.2 °C) 80 18 177/94 122 95 % -- None (Room air) -- Lying -- --    12/09/24 2102 -- -- -- -- -- -- -- -- -- -- 15 8    12/09/24 1941 -- -- -- -- -- -- -- -- -- -- -- No Pain    12/09/24 1819 -- -- -- -- -- -- -- None (Room air) -- -- 15 4    12/09/24 1715 -- 96 14 156/94 119 98 % -- None (Room air) [facility] -- -- --    12/09/24 1700 98 °F (36.7 °C) 92 14 152/85 114 98 % --  None (Room air) WDL -- -- 7    12/09/24 1645 -- 92 15 147/88 112 95 % -- None (Room air) WDL -- -- 8    12/09/24 1643 -- 92 11 -- -- 95 % -- -- -- -- -- 8 12/09/24 1635 -- 92 9 152/93 116 94 % -- -- -- -- -- 8 12/09/24 1630 98 °F (36.7 °C) 92 14 152/93 116 92 % -- None (Room air) WDL -- -- 8 12/09/24 1615 98 °F (36.7 °C) 90 14 148/92 115 97 % -- None (Room air) WDL -- -- 8 12/09/24 1610 -- -- -- -- -- -- -- -- -- -- -- 8 12/09/24 1606 -- -- -- -- -- -- -- -- -- -- -- 8 12/09/24 1600 -- 90 21 162/92 120 97 % -- None (Room air) WDL -- -- 9 12/09/24 1554 -- 94 24 -- -- 98 % -- -- -- -- -- 9 12/09/24 1549 -- 92 18 135/61 86 100 % -- -- -- -- -- 9 12/09/24 1545 -- 94 18 135/61 86 100 % -- Simple mask WDL -- -- 9 12/09/24 1537 98.1 °F (36.7 °C) 94 18 163/102 -- 98 % 2 L/min Simple mask WDL -- -- No Pain    12/09/24 1042 -- -- -- -- -- -- -- -- -- -- -- Med Not Given for Pain - for MAR use only    12/09/24 1028 98.5 °F (36.9 °C) 79 20 132/90 -- 98 % -- None (Room air) -- -- -- No Pain          Weight (last 2 days)       Date/Time Weight    12/11/24 0600 61.1 (134.7)    12/10/24 0600 71.3 (157.19)    12/09/24 1028 63.5 (140)            Pertinent Labs/Diagnostic Test Results:    12/11 CXR- await read         Results from last 7 days   Lab Units 12/11/24  0338 12/10/24  0403 12/09/24  1600   WBC Thousand/uL 14.08* 15.78* 7.15   HEMOGLOBIN g/dL 10.4* 11.8* 11.9*   HEMATOCRIT % 30.5* 35.1* 36.3*   PLATELETS Thousands/uL 221 258 252   TOTAL NEUT ABS Thousands/µL 10.55*  --   --          Results from last 7 days   Lab Units 12/11/24  0338 12/10/24  0403 12/09/24  1600   SODIUM mmol/L 133* 133* 136   POTASSIUM mmol/L 3.3* 3.8 4.3   CHLORIDE mmol/L 103 101 105   CO2 mmol/L 26 29 26   ANION GAP mmol/L 4 3* 5   BUN mg/dL 12 17 19   CREATININE mg/dL 0.92 1.05 1.14   EGFR ml/min/1.73sq m 93 79 71   CALCIUM mg/dL 8.1* 8.6 8.4     Results from last 7 days   Lab Units 12/11/24  0338   AST U/L 23   ALT U/L 17    ALK PHOS U/L 49   TOTAL PROTEIN g/dL 6.8   ALBUMIN g/dL 3.4*   TOTAL BILIRUBIN mg/dL 0.56         Results from last 7 days   Lab Units 12/11/24  0338 12/10/24  0403 12/09/24  1600   GLUCOSE RANDOM mg/dL 123 149* 111             Network Utilization Review Department  ATTENTION: Please call with any questions or concerns to [phone] and carefully listen to the prompts so that you are directed to the right person. All voicemails are confidential.   For Discharge needs, contact Care Management DC Support Team at [phone] opt. [phone]  Send all requests for admission clinical reviews, approved or denied determinations and any other requests to dedicated fax number below belonging to the campus where the patient is receiving treatment. List of dedicated fax numbers for the Facilities:  FACILITY NAME UR FAX NUMBER   ADMISSION DENIALS (Administrative/Medical Necessity) [phone]   DISCHARGE SUPPORT TEAM (NETWORK) [phone]   PARENT CHILD HEALTH (Maternity/NICU/Pediatrics) [phone]   [facility] [phone]   [facility] [phone]   Critical access hospital [phone]   [facility] [phone]   [facility] [phone]   [facility] [phone]   [facility] [phone]   [facility] [phone]   [facility] [phone]   Pending sale to [facility] [phone]   [facility] [phone]   [facility] [phone]

## 2024-12-10 NOTE — PROGRESS NOTES
Progress Note - Urology   Name: Roby Louis 54 y.o. male I MRN: [id number]  Unit/Bed#: S -01 I Date of Admission: 12/9/2024   Date of Service: 12/10/2024 I Hospital Day: 0    Assessment & Plan  BPH with obstruction/lower urinary tract symptoms  Postoperative day 1 robotic assisted laparoscopic suprapubic prostatectomy  Afebrile, hemodynamically stable but reporting multiple somatic complaints such as anorexia, nausea without vomiting/inability to tolerate solids and headache.    Mild leukocytosis-as likely reactive.  Normal renal function.  Three-way Granda-CBI patent for--light blush urine.  YUMI drain-# 15 cc overnight.  Serosanguineous  Diet-regular-taking in clears modestly.  Due to poor appetite and feeling of nausea.  Abdomen--puncture sites and incision intact.  No appreciable increasing abdominal girth or distention    Plan:  Routine postoperative care  Monitor diet tolerance.  Nausea likely due to anesthesia reaction.  Abdomen is nonsurgical.  Aggressively ambulate--out of bed for all meals   Abdominal binder  Clamp CBI after bag is complete  Monitor YUMI output  Will require an additional night stay to work on diet tolerance and pain control.          Elevated PSA  Surgical tissue sample sent to pathology.  Will be reviewed at outpatient visit.    Urinary retention due to benign prostatic hyperplasia  As above      Urology service will follow.    Subjective   Roby Louis is a 54-year-old male with BPH and urinary obstruction as well as positive UTI.  Sizable gland of approximately 73 g failing medical management.  He presented electively for robotic assisted suprapubic prostatectomy.  Now postoperative day 1.    Patient is afebrile and hemodynamically stable.  Offers generalized somatic complaintsincluding headache, anorexia, nausea without vomiting, and generalized weakness.      Objective :  Temp:  [98 °F (36.7 °C)-99.5 °F (37.5 °C)] 99.5 °F (37.5 °C)  HR:  [68-96] 79  BP: (132-177)/()  135/86  Resp:  [9-24] 16  SpO2:  [92 %-100 %] 95 %  O2 Device: None (Room air)    I/O         12/08 0701  12/09 0700 12/09 0701  12/10 0700 12/10 0701  12/11 0700    I.V. (mL/kg)  2100 (29.5)     IV Piggyback  100     Total Intake(mL/kg)  2200 (30.9)     Urine (mL/kg/hr)  2950     Emesis/NG output  300     Drains  15     Blood  150     Total Output  3415     Net  -1215                  Lines/Drains/Airways       Active Status       Name Placement date Placement time Site Days    Closed/Suction Drain LLQ Bulb 19 Fr. 12/09/24  1505  LLQ  less than 1    Urethral Catheter Three way;Non-latex;Other (Comment) 24 Fr. 12/09/24  1255  Three way;Non-latex;Other (Comment)  less than 1    Continuous Bladder Irrigation 12/09/24  1900  --  less than 1                  Physical Exam  General appearance: alert and oriented, in no acute distress, appears stated age, cooperative, and no distress  Head: Normocephalic, without obvious abnormality, atraumatic  Neck: no JVD and supple, symmetrical, trachea midline  Lungs: diminished breath sounds  Heart: regular rate and rhythm, S1, S2 normal, no murmur, click, rub or gallop  Abdomen: abnormal findings:  moderate tenderness in the lower abdomen and puncture sites intact.  Lower abdominal incision with Dermabond.  No signs of dehiscence.  Male genitalia: normal  Extremities: extremities normal, warm and well-perfused; no cyanosis, clubbing, or edema  Pulses: 2+ and symmetric  Neurologic: Grossly normal  Granda-tristian with hint of heme.  Clear.      Lab Results: I have reviewed the following results:  Recent Labs     12/10/24  0403   WBC 15.78*   HGB 11.8*   HCT 35.1*      SODIUM 133*   K 3.8      CO2 29   BUN 17   CREATININE 1.05   GLUC 149*

## 2024-12-10 NOTE — ASSESSMENT & PLAN NOTE
Postoperative day 1 robotic assisted laparoscopic suprapubic prostatectomy  Afebrile, hemodynamically stable but reporting multiple somatic complaints such as anorexia, nausea without vomiting/inability to tolerate solids and headache.    Mild leukocytosis-as likely reactive.  Normal renal function.  Three-way Granda-CBI patent for--light blush urine.  YUMI drain-# 15 cc overnight.  Serosanguineous  Diet-regular-taking in clears modestly.  Due to poor appetite and feeling of nausea.  Abdomen--puncture sites and incision intact.  No appreciable increasing abdominal girth or distention    Plan:  Routine postoperative care  Monitor diet tolerance.  Nausea likely due to anesthesia reaction.  Abdomen is nonsurgical.  Aggressively ambulate--out of bed for all meals   Abdominal binder  Clamp CBI after bag is complete  Monitor YUMI output  Will require an additional night stay to work on diet tolerance and pain control.

## 2024-12-11 ENCOUNTER — APPOINTMENT (INPATIENT)
Dept: RADIOLOGY | Facility: HOSPITAL | Age: 54
DRG: 710 | End: 2024-12-11
Payer: MEDICAID

## 2024-12-11 LAB
ALBUMIN SERPL BCG-MCNC: 3.4 G/DL (ref 3.5–5)
ALP SERPL-CCNC: 49 U/L (ref 34–104)
ALT SERPL W P-5'-P-CCNC: 17 U/L (ref 7–52)
ANION GAP SERPL CALCULATED.3IONS-SCNC: 4 MMOL/L (ref 4–13)
AST SERPL W P-5'-P-CCNC: 23 U/L (ref 13–39)
BASOPHILS # BLD AUTO: 0.01 THOUSANDS/ÂΜL (ref 0–0.1)
BASOPHILS NFR BLD AUTO: 0 % (ref 0–1)
BILIRUB SERPL-MCNC: 0.56 MG/DL (ref 0.2–1)
BUN SERPL-MCNC: 12 MG/DL (ref 5–25)
CALCIUM ALBUM COR SERPL-MCNC: 8.6 MG/DL (ref 8.3–10.1)
CALCIUM SERPL-MCNC: 8.1 MG/DL (ref 8.4–10.2)
CHLORIDE SERPL-SCNC: 103 MMOL/L (ref 96–108)
CO2 SERPL-SCNC: 26 MMOL/L (ref 21–32)
CREAT SERPL-MCNC: 0.92 MG/DL (ref 0.6–1.3)
EOSINOPHIL # BLD AUTO: 0 THOUSAND/ÂΜL (ref 0–0.61)
EOSINOPHIL NFR BLD AUTO: 0 % (ref 0–6)
ERYTHROCYTE [DISTWIDTH] IN BLOOD BY AUTOMATED COUNT: 13.2 % (ref 11.6–15.1)
FLUAV RNA RESP QL NAA+PROBE: NEGATIVE
FLUBV RNA RESP QL NAA+PROBE: NEGATIVE
GFR SERPL CREATININE-BSD FRML MDRD: 93 ML/MIN/1.73SQ M
GLUCOSE SERPL-MCNC: 123 MG/DL (ref 65–140)
HCT VFR BLD AUTO: 30.5 % (ref 36.5–49.3)
HGB BLD-MCNC: 10.4 G/DL (ref 12–17)
IMM GRANULOCYTES # BLD AUTO: 0.07 THOUSAND/UL (ref 0–0.2)
IMM GRANULOCYTES NFR BLD AUTO: 1 % (ref 0–2)
LYMPHOCYTES # BLD AUTO: 2.43 THOUSANDS/ÂΜL (ref 0.6–4.47)
LYMPHOCYTES NFR BLD AUTO: 17 % (ref 14–44)
MCH RBC QN AUTO: 28.5 PG (ref 26.8–34.3)
MCHC RBC AUTO-ENTMCNC: 34.1 G/DL (ref 31.4–37.4)
MCV RBC AUTO: 84 FL (ref 82–98)
MONOCYTES # BLD AUTO: 1.02 THOUSAND/ÂΜL (ref 0.17–1.22)
MONOCYTES NFR BLD AUTO: 7 % (ref 4–12)
NEUTROPHILS # BLD AUTO: 10.55 THOUSANDS/ÂΜL (ref 1.85–7.62)
NEUTS SEG NFR BLD AUTO: 75 % (ref 43–75)
NRBC BLD AUTO-RTO: 0 /100 WBCS
PLATELET # BLD AUTO: 221 THOUSANDS/UL (ref 149–390)
PMV BLD AUTO: 9.5 FL (ref 8.9–12.7)
POTASSIUM SERPL-SCNC: 3.3 MMOL/L (ref 3.5–5.3)
PROT SERPL-MCNC: 6.8 G/DL (ref 6.4–8.4)
RBC # BLD AUTO: 3.65 MILLION/UL (ref 3.88–5.62)
RSV RNA RESP QL NAA+PROBE: NEGATIVE
SARS-COV-2 RNA RESP QL NAA+PROBE: NEGATIVE
SODIUM SERPL-SCNC: 133 MMOL/L (ref 135–147)
WBC # BLD AUTO: 14.08 THOUSAND/UL (ref 4.31–10.16)

## 2024-12-11 PROCEDURE — 71046 X-RAY EXAM CHEST 2 VIEWS: CPT

## 2024-12-11 PROCEDURE — 99024 POSTOP FOLLOW-UP VISIT: CPT | Performed by: NURSE PRACTITIONER

## 2024-12-11 PROCEDURE — 85025 COMPLETE CBC W/AUTO DIFF WBC: CPT | Performed by: UROLOGY

## 2024-12-11 PROCEDURE — 80053 COMPREHEN METABOLIC PANEL: CPT | Performed by: UROLOGY

## 2024-12-11 PROCEDURE — 0241U HB NFCT DS VIR RESP RNA 4 TRGT: CPT | Performed by: NURSE PRACTITIONER

## 2024-12-11 PROCEDURE — 87040 BLOOD CULTURE FOR BACTERIA: CPT | Performed by: NURSE PRACTITIONER

## 2024-12-11 RX ORDER — POTASSIUM CHLORIDE 1500 MG/1
40 TABLET, EXTENDED RELEASE ORAL ONCE
Status: COMPLETED | OUTPATIENT
Start: 2024-12-11 | End: 2024-12-11

## 2024-12-11 RX ORDER — ALPRAZOLAM 0.5 MG
0.5 TABLET ORAL 2 TIMES DAILY PRN
Status: DISCONTINUED | OUTPATIENT
Start: 2024-12-11 | End: 2024-12-16 | Stop reason: HOSPADM

## 2024-12-11 RX ORDER — CEPHALEXIN 500 MG/1
500 CAPSULE ORAL EVERY 6 HOURS SCHEDULED
Qty: 5 CAPSULE | Refills: 0 | Status: SHIPPED | OUTPATIENT
Start: 2024-12-11 | End: 2024-12-13

## 2024-12-11 RX ORDER — SENNOSIDES A AND B 8.6 MG/1
1 TABLET, FILM COATED ORAL DAILY
Qty: 5 TABLET | Refills: 0 | Status: SHIPPED | OUTPATIENT
Start: 2024-12-11 | End: 2024-12-16

## 2024-12-11 RX ORDER — SODIUM CHLORIDE 9 MG/ML
75 INJECTION, SOLUTION INTRAVENOUS CONTINUOUS
Status: DISCONTINUED | OUTPATIENT
Start: 2024-12-11 | End: 2024-12-12

## 2024-12-11 RX ADMIN — LISINOPRIL 40 MG: 20 TABLET ORAL at 09:12

## 2024-12-11 RX ADMIN — POTASSIUM CHLORIDE 40 MEQ: 1500 TABLET, EXTENDED RELEASE ORAL at 10:05

## 2024-12-11 RX ADMIN — AZTREONAM 1000 MG: 1 INJECTION, POWDER, LYOPHILIZED, FOR SOLUTION INTRAMUSCULAR; INTRAVENOUS at 19:01

## 2024-12-11 RX ADMIN — BUTALBITAL, ACETAMINOPHEN, AND CAFFEINE 1 TABLET: 325; 50; 40 TABLET ORAL at 09:16

## 2024-12-11 RX ADMIN — CEFTRIAXONE SODIUM 1000 MG: 10 INJECTION, POWDER, FOR SOLUTION INTRAVENOUS at 15:01

## 2024-12-11 RX ADMIN — CEPHALEXIN 500 MG: 500 CAPSULE ORAL at 05:08

## 2024-12-11 RX ADMIN — BUTALBITAL, ACETAMINOPHEN, AND CAFFEINE 1 TABLET: 325; 50; 40 TABLET ORAL at 17:11

## 2024-12-11 RX ADMIN — ACETAMINOPHEN 650 MG: 325 TABLET, FILM COATED ORAL at 14:06

## 2024-12-11 RX ADMIN — ACETAMINOPHEN 650 MG: 325 TABLET, FILM COATED ORAL at 03:45

## 2024-12-11 RX ADMIN — CEPHALEXIN 500 MG: 500 CAPSULE ORAL at 11:46

## 2024-12-11 NOTE — NURSING NOTE
12/10/24 @0242 pts temp was reported 101.9 Hr of 106. At this time pt had had 4 heavy blankets and a hat on him. Staff requested to take off few blankets and hat off. Pt wanted his continuous IVF off, He refused the offered tylenol. Pt was educated on same. Informed same to on call urology CRNP. New orders received for blood cultures.   Obtained blood cultures, Pt opted to take tylenol after more education. Repeat temp was 100.3. Will continue to monitor.

## 2024-12-11 NOTE — ASSESSMENT & PLAN NOTE
Postoperative day 2 robotic assisted laparoscopic suprapubic prostatectomy  Low-grade temperatures overnight.  Patient wrapped up in a very thick sharp blanket.  Offers complaints of right-sided headache ipsilateral with IV site--patient making association.  Tolerating clear liquids.  Expressed anxiety to advance.  Otherwise, abdomen with puncture sites and incision--Dermabond, clean dry and intact well-approximated without evidence of dehiscence or crepitus.  YUMI drain--scant drainage of serosanguineous--removed without incident.    Plan:  Replete potassium  Remove right IV site.  Patient has left sided Hep-Lock.  Continue oral hydration  Trend temperature  Obtain chest x-ray  Continue Keflex  Maintain Granda  If afebrile this afternoon--discharge to home.  Patient has verbalized increasing anxiety within the hospital environment.  Agree that patient may advance postoperative progress and recovery in familiar surroundings.  Added Xanax as needed twice daily.

## 2024-12-11 NOTE — PLAN OF CARE
Problem: PAIN - ADULT  Goal: Verbalizes/displays adequate comfort level or baseline comfort level  Description: Interventions:  - Encourage patient to monitor pain and request assistance  - Assess pain using appropriate pain scale  - Administer analgesics based on type and severity of pain and evaluate response  - Implement non-pharmacological measures as appropriate and evaluate response  - Consider cultural and social influences on pain and pain management  - Notify physician/advanced practitioner if interventions unsuccessful or patient reports new pain  Outcome: Progressing     Problem: INFECTION - ADULT  Goal: Absence or prevention of progression during hospitalization  Description: INTERVENTIONS:  - Assess and monitor for signs and symptoms of infection  - Monitor lab/diagnostic results  - Monitor all insertion sites, i.e. indwelling lines, tubes, and drains  - Monitor endotracheal if appropriate and nasal secretions for changes in amount and color  - [place] appropriate cooling/warming therapies per order  - Administer medications as ordered  - Instruct and encourage patient and family to use good hand hygiene technique  - Identify and instruct in appropriate isolation precautions for identified infection/condition  Outcome: Progressing  Goal: Absence of fever/infection during neutropenic period  Description: INTERVENTIONS:  - Monitor WBC    Outcome: Progressing     Problem: SAFETY ADULT  Goal: Patient will remain free of falls  Description: INTERVENTIONS:  - Educate patient/family on patient safety including physical limitations  - Instruct patient to call for assistance with activity   - Consult OT/PT to assist with strengthening/mobility   - Keep Call bell within reach  - Keep bed low and locked with side rails adjusted as appropriate  - Keep care items and personal belongings within reach  - Initiate and maintain comfort rounds  - Make Fall Risk Sign visible to staff  - Offer Toileting every 2 Hours,  in advance of need  - Initiate/Maintain bed/chair alarm  - Obtain necessary fall risk management equipment  - Apply yellow socks and bracelet for high fall risk patients  - Consider moving patient to room near nurses station  Outcome: Progressing  Goal: Maintain or return to baseline ADL function  Description: INTERVENTIONS:  -  Assess patient's ability to carry out ADLs; assess patient's baseline for ADL function and identify physical deficits which impact ability to perform ADLs (bathing, care of mouth/teeth, toileting, grooming, dressing, etc.)  - Assess/evaluate cause of self-care deficits   - Assess range of motion  - Assess patient's mobility; develop plan if impaired  - Assess patient's need for assistive devices and provide as appropriate  - Encourage maximum independence but intervene and supervise when necessary  - Involve family in performance of ADLs  - Assess for home care needs following discharge   - Consider OT consult to assist with ADL evaluation and planning for discharge  - Provide patient education as appropriate  Outcome: Progressing  Goal: Maintains/Returns to pre admission functional level  Description: INTERVENTIONS:  - Perform AM-PAC 6 Click Basic Mobility/ Daily Activity assessment daily.  - Set and communicate daily mobility goal to care team and patient/family/caregiver.   - Collaborate with rehabilitation services on mobility goals if consulted  - Perform Range of Motion 3 times a day.  - Reposition patient every 2 hours.  - Dangle patient 3 times a day  - Stand patient 3 times a day  - Ambulate patient 3 times a day  - Out of bed to chair 3 times a day   - Out of bed for meals 3 times a day  - Out of bed for toileting  - Record patient progress and toleration of activity level   Outcome: Progressing     Problem: DISCHARGE PLANNING  Goal: Discharge to home or other facility with appropriate resources  Description: INTERVENTIONS:  - Identify barriers to discharge w/patient and  caregiver  - Arrange for needed discharge resources and transportation as appropriate  - Identify discharge learning needs (meds, wound care, etc.)  - Arrange for interpretive services to assist at discharge as needed  - Refer to Case Management Department for coordinating discharge planning if the patient needs post-hospital services based on physician/advanced practitioner order or complex needs related to functional status, cognitive ability, or social support system  Outcome: Progressing     Problem: Knowledge Deficit  Goal: Patient/family/caregiver demonstrates understanding of disease process, treatment plan, medications, and discharge instructions  Description: Complete learning assessment and assess knowledge base.  Interventions:  - Provide teaching at level of understanding  - Provide teaching via preferred learning methods  Outcome: Progressing     Problem: Prexisting or High Potential for Compromised Skin Integrity  Goal: Skin integrity is maintained or improved  Description: INTERVENTIONS:  - Identify patients at risk for skin breakdown  - Assess and monitor skin integrity  - Assess and monitor nutrition and hydration status  - Monitor labs   - Assess for incontinence   - Turn and reposition patient  - Assist with mobility/ambulation  - Relieve pressure over bony prominences  - Avoid friction and shearing  - Provide appropriate hygiene as needed including keeping skin clean and dry  - Evaluate need for skin moisturizer/barrier cream  - Collaborate with interdisciplinary team   - Patient/family teaching  - Consider wound care consult   Outcome: Progressing

## 2024-12-11 NOTE — DISCHARGE SUMMARY
Discharge Summary - Urology   Name: Roby Louis 54 y.o. male I MRN: [id number]  Unit/Bed#: S -01 I Date of Admission: 12/9/2024   Date of Service: 12/11/2024 I Hospital Day: 1    Admission Date: 12/9/2024  Discharge Date:   Admitting Diagnosis: BPH with obstruction/lower urinary tract symptoms [N40.1, N13.8]  Urinary retention due to benign prostatic hyperplasia [N40.1, R33.8]    HPI: Roby Louis is a 54-year-old male with BPH and obstruction.  After an explanation of risk versus benefits and potential complications, patient was agreeable to proceed with robotic assisted simple prostatectomy.      Procedures Performed: No orders of the defined types were placed in this encounter.  Robotic assisted laparoscopic suprapubic prostatectomy    Hospital Course Roby Louis  Was admitted electively to the service of Dr. Keegan Herzog and was taken to the operative theater 12/9 for the aforementioned procedure.  He was admitted postoperatively for routine care.  He tolerated clear liquid diet and progressed to soft surgical diet.  He ambulated.  Granda and YUMI drain remained in situ.  YUMI was removed due to scant output.  He developed low-grade fever.  Chest x-ray obtained and this was negative for cardiopulmonary abnormality.  Patient was afebrile, adequately pain controlled, eating, ambulating on postoperative day 2.  He will be discharged with Granda catheter in situ for postoperative requirement.  Has appointment 12/23 for T OV and postoperative check.              Complications: None    Discharge Diagnosis: BPH with obstruction    Medical Problems       Resolved Problems  Date Reviewed: 12/9/2024   None         Condition at Discharge: good         Discharge instructions/Information to patient and family:   See after visit summary for information provided to patient and family.      Provisions for Follow-Up Care:  See after visit summary for information related to follow-up care and any pertinent home health orders.       Disposition: Home      Planned Readmission: No    Discharge Statement:  I have spent a total time of 20 minutes in caring for this patient on the day of the visit/encounter.     Discharge Medications:  See after visit summary for reconciled discharge medications provided to patient and family.

## 2024-12-11 NOTE — PROGRESS NOTES
Progress Note - Urology   Name: Roby Louis 54 y.o. male I MRN: [id number]  Unit/Bed#: S -01 I Date of Admission: 12/9/2024   Date of Service: 12/11/2024 I Hospital Day: 1    Assessment & Plan  BPH with obstruction/lower urinary tract symptoms  Postoperative day 2 robotic assisted laparoscopic suprapubic prostatectomy  Low-grade temperatures overnight.  Patient wrapped up in a very thick sharp blanket.  Offers complaints of right-sided headache ipsilateral with IV site--patient making association.  Tolerating clear liquids.  Expressed anxiety to advance.  Otherwise, abdomen with puncture sites and incision--Dermabond, clean dry and intact well-approximated without evidence of dehiscence or crepitus.  YUMI drain--scant drainage of serosanguineous--removed without incident.    Plan:  Replete potassium  Remove right IV site.  Patient has left sided Hep-Lock.  Continue oral hydration  Trend temperature  Obtain chest x-ray  Continue Keflex  Maintain Granda  If afebrile this afternoon--discharge to home.  Patient has verbalized increasing anxiety within the hospital environment.  Agree that patient may advance postoperative progress and recovery in familiar surroundings.  Added Xanax as needed twice daily.          Elevated PSA  Surgical tissue sample sent to pathology.  Will be reviewed at outpatient visit.    Urinary retention due to benign prostatic hyperplasia  As above      Subjective   Roby Louis is a 54-year-old male with BPH and obstruction, sizable plan.  Postoperative day 2 robotic assisted simple prostatectomy.  Low-grade temperatures overnight.  Tolerating large amounts of clear liquids.  Patient expresses fear/anxiety over advancing diet.  No nausea or vomiting witnessed.  Lower abdominal pain-/mild.  Complains of headache related to ipsilateral IV site.  Ambulating halls.    Objective :  Temp:  [99.3 °F (37.4 °C)-101.9 °F (38.8 °C)] 99.3 °F (37.4 °C)  HR:  [] 82  BP: (124-155)/(80-91)  153/88  Resp:  [15-19] 18  SpO2:  [92 %-97 %] 94 %    I/O         12/09 0701  12/10 0700 12/10 0701 12/11 0700 12/11 0701 12/12 0700    P.O.  720     I.V. (mL/kg) 2100 (29.5) 2304.2 (37.7)     IV Piggyback 100      Total Intake(mL/kg) 2200 (30.9) 3024.2 (49.5)     Urine (mL/kg/hr) 2950 5725 (3.9)     Emesis/NG output 300      Drains 15 10     Blood 150      Total Output 3415 5735     Net -1215 -2710.8                  Lines/Drains/Airways       Active Status       Name Placement date Placement time Site Days    Closed/Suction Drain LLQ Bulb 19 Fr. 12/09/24  1505  LLQ  1    Urethral Catheter Three way;Non-latex;Other (Comment) 24 Fr. 12/09/24  1255  Three way;Non-latex;Other (Comment)  1    Continuous Bladder Irrigation 12/09/24  1900  --  1                  Physical Exam  General appearance: alert and oriented, in no acute distress, appears stated age, cooperative, and no distress  Head: Normocephalic, without obvious abnormality, atraumatic  Neck: no JVD and supple, symmetrical, trachea midline  Lungs: diminished breath sounds  Heart: regular rate and rhythm, S1, S2 normal, no murmur, click, rub or gallop  Abdomen: abnormal findings:  mild tenderness in the lower abdomen  Extremities: extremities normal, warm and well-perfused; no cyanosis, clubbing, or edema  Pulses: 2+ and symmetric  Neurologic: Grossly normal      Lab Results: I have reviewed the following results:  Recent Labs     12/11/24  0338   WBC 14.08*   HGB 10.4*   HCT 30.5*      SODIUM 133*   K 3.3*      CO2 26   BUN 12   CREATININE 0.92   GLUC 123   AST 23   ALT 17   ALB 3.4*   TBILI 0.56   ALKPHOS 49         VTE Pharmacologic Prophylaxis: Reason for no pharmacologic prophylaxis per surgeon of record--high risk for bleeding  VTE Mechanical Prophylaxis: sequential compression device

## 2024-12-11 NOTE — NURSING NOTE
"IV fluids ordered and patient refused.  Patient educated on reasoning why fluids were ordered and patient still refusing.  Patient also refusing all meals stating he \"does not want to get nauseous\".  Offered prn zofran before meals and encouraged patient to try but still does not want to order meals.  Encouraged patient to ambulate in halls, and patient refused stating \"maybe later\".  Patient also asking for IV catheters to be removed, educated the patient on importance of having an IV available and patient agreeable to keeping them in place at this time.  Provider notified.  Vitals stable at this time and patient is resting.   "

## 2024-12-11 NOTE — QUICK NOTE
Plan was for patient to be discharged later this afternoon if he had remained afebrile.  However, prior to discharge the patient spiked a fever of 101.2.  Discharge orders at that time were canceled.  Patient's temperature was reassessed at 5:15 PM and returned to a value of 99.1.    Assessment:   - Postoperative day 2 robotic assisted laparoscopic suprapubic prostatectomy  - Patient developed low-grade fever prior to discharge this afternoon, and experienced a low-grade fever the night prior  - Blood culture results drawn 12/11/2024 still pending  - Chest x-ray ordered and final results still pending.  Based on my personal reading, no concerning findings found on x-ray.  - Patient was previously initiated on IV Rocephin and oral Keflex  - Believe low-grade fevers believed to be secondary to ongoing infection within the urinary tract      Plan  - Discontinue IV Rocephin and initiate IV Azactam for coverage against bacteria based on preop urine culture testing  - Continue oral Keflex  - Continue oral hydration  - Continue to trend labs and temperature  - Maintain Granda catheter  - Monitor for finalized x-ray results and blood culture results  - Keep patient overnight for continued observation

## 2024-12-12 PROBLEM — I10 HTN (HYPERTENSION): Status: ACTIVE | Noted: 2024-12-12

## 2024-12-12 PROBLEM — E87.1 HYPONATREMIA: Status: ACTIVE | Noted: 2024-12-12

## 2024-12-12 PROBLEM — R50.9 FEVER: Status: ACTIVE | Noted: 2024-12-12

## 2024-12-12 LAB
ANION GAP SERPL CALCULATED.3IONS-SCNC: 6 MMOL/L (ref 4–13)
ANION GAP SERPL CALCULATED.3IONS-SCNC: 7 MMOL/L (ref 4–13)
ANION GAP SERPL CALCULATED.3IONS-SCNC: 8 MMOL/L (ref 4–13)
ANION GAP SERPL CALCULATED.3IONS-SCNC: 8 MMOL/L (ref 4–13)
BUN SERPL-MCNC: 10 MG/DL (ref 5–25)
BUN SERPL-MCNC: 12 MG/DL (ref 5–25)
BUN SERPL-MCNC: 13 MG/DL (ref 5–25)
BUN SERPL-MCNC: 15 MG/DL (ref 5–25)
CALCIUM SERPL-MCNC: 8.1 MG/DL (ref 8.4–10.2)
CALCIUM SERPL-MCNC: 8.2 MG/DL (ref 8.4–10.2)
CALCIUM SERPL-MCNC: 8.2 MG/DL (ref 8.4–10.2)
CALCIUM SERPL-MCNC: 8.3 MG/DL (ref 8.4–10.2)
CHLORIDE SERPL-SCNC: 93 MMOL/L (ref 96–108)
CHLORIDE SERPL-SCNC: 93 MMOL/L (ref 96–108)
CHLORIDE SERPL-SCNC: 95 MMOL/L (ref 96–108)
CHLORIDE SERPL-SCNC: 97 MMOL/L (ref 96–108)
CO2 SERPL-SCNC: 22 MMOL/L (ref 21–32)
CO2 SERPL-SCNC: 22 MMOL/L (ref 21–32)
CO2 SERPL-SCNC: 23 MMOL/L (ref 21–32)
CO2 SERPL-SCNC: 23 MMOL/L (ref 21–32)
CREAT SERPL-MCNC: 0.96 MG/DL (ref 0.6–1.3)
CREAT SERPL-MCNC: 1.14 MG/DL (ref 0.6–1.3)
CREAT SERPL-MCNC: 1.21 MG/DL (ref 0.6–1.3)
CREAT SERPL-MCNC: 1.25 MG/DL (ref 0.6–1.3)
ERYTHROCYTE [DISTWIDTH] IN BLOOD BY AUTOMATED COUNT: 13 % (ref 11.6–15.1)
GFR SERPL CREATININE-BSD FRML MDRD: 64 ML/MIN/1.73SQ M
GFR SERPL CREATININE-BSD FRML MDRD: 66 ML/MIN/1.73SQ M
GFR SERPL CREATININE-BSD FRML MDRD: 71 ML/MIN/1.73SQ M
GFR SERPL CREATININE-BSD FRML MDRD: 88 ML/MIN/1.73SQ M
GLUCOSE SERPL-MCNC: 127 MG/DL (ref 65–140)
GLUCOSE SERPL-MCNC: 130 MG/DL (ref 65–140)
GLUCOSE SERPL-MCNC: 135 MG/DL (ref 65–140)
GLUCOSE SERPL-MCNC: 136 MG/DL (ref 65–140)
HCT VFR BLD AUTO: 34.1 % (ref 36.5–49.3)
HGB BLD-MCNC: 11.6 G/DL (ref 12–17)
MCH RBC QN AUTO: 28.6 PG (ref 26.8–34.3)
MCHC RBC AUTO-ENTMCNC: 34 G/DL (ref 31.4–37.4)
MCV RBC AUTO: 84 FL (ref 82–98)
OSMOLALITY UR/SERPL-RTO: 262 MMOL/KG (ref 282–298)
OSMOLALITY UR: 495 MMOL/KG (ref 250–900)
PLATELET # BLD AUTO: 237 THOUSANDS/UL (ref 149–390)
PMV BLD AUTO: 9.8 FL (ref 8.9–12.7)
POTASSIUM SERPL-SCNC: 3.8 MMOL/L (ref 3.5–5.3)
POTASSIUM SERPL-SCNC: 3.8 MMOL/L (ref 3.5–5.3)
POTASSIUM SERPL-SCNC: 4 MMOL/L (ref 3.5–5.3)
POTASSIUM SERPL-SCNC: 4.1 MMOL/L (ref 3.5–5.3)
RBC # BLD AUTO: 4.06 MILLION/UL (ref 3.88–5.62)
SODIUM 24H UR-SCNC: 126 MOL/L
SODIUM SERPL-SCNC: 123 MMOL/L (ref 135–147)
SODIUM SERPL-SCNC: 123 MMOL/L (ref 135–147)
SODIUM SERPL-SCNC: 124 MMOL/L (ref 135–147)
SODIUM SERPL-SCNC: 127 MMOL/L (ref 135–147)
WBC # BLD AUTO: 14.9 THOUSAND/UL (ref 4.31–10.16)

## 2024-12-12 PROCEDURE — 80048 BASIC METABOLIC PNL TOTAL CA: CPT | Performed by: INTERNAL MEDICINE

## 2024-12-12 PROCEDURE — 83935 ASSAY OF URINE OSMOLALITY: CPT

## 2024-12-12 PROCEDURE — 99024 POSTOP FOLLOW-UP VISIT: CPT

## 2024-12-12 PROCEDURE — 88307 TISSUE EXAM BY PATHOLOGIST: CPT | Performed by: PATHOLOGY

## 2024-12-12 PROCEDURE — 99255 IP/OBS CONSLTJ NEW/EST HI 80: CPT | Performed by: INTERNAL MEDICINE

## 2024-12-12 PROCEDURE — 80048 BASIC METABOLIC PNL TOTAL CA: CPT | Performed by: UROLOGY

## 2024-12-12 PROCEDURE — 85027 COMPLETE CBC AUTOMATED: CPT | Performed by: UROLOGY

## 2024-12-12 PROCEDURE — 83930 ASSAY OF BLOOD OSMOLALITY: CPT

## 2024-12-12 PROCEDURE — 80048 BASIC METABOLIC PNL TOTAL CA: CPT

## 2024-12-12 PROCEDURE — 84300 ASSAY OF URINE SODIUM: CPT

## 2024-12-12 RX ORDER — SODIUM CHLORIDE 1 G/1
1 TABLET ORAL 2 TIMES DAILY WITH MEALS
Status: DISCONTINUED | OUTPATIENT
Start: 2024-12-12 | End: 2024-12-12

## 2024-12-12 RX ORDER — FUROSEMIDE 10 MG/ML
10 INJECTION INTRAMUSCULAR; INTRAVENOUS ONCE
Status: COMPLETED | OUTPATIENT
Start: 2024-12-12 | End: 2024-12-12

## 2024-12-12 RX ORDER — SODIUM CHLORIDE 1 G/1
2 TABLET ORAL ONCE
Status: COMPLETED | OUTPATIENT
Start: 2024-12-12 | End: 2024-12-12

## 2024-12-12 RX ORDER — SODIUM CHLORIDE 9 MG/ML
100 INJECTION, SOLUTION INTRAVENOUS CONTINUOUS
Status: DISCONTINUED | OUTPATIENT
Start: 2024-12-12 | End: 2024-12-12

## 2024-12-12 RX ORDER — SODIUM CHLORIDE 1 G/1
1 TABLET ORAL 2 TIMES DAILY
Status: DISCONTINUED | OUTPATIENT
Start: 2024-12-12 | End: 2024-12-12

## 2024-12-12 RX ORDER — BACLOFEN 10 MG/1
10 TABLET ORAL 3 TIMES DAILY
Status: DISPENSED | OUTPATIENT
Start: 2024-12-12 | End: 2024-12-13

## 2024-12-12 RX ADMIN — BACLOFEN 10 MG: 10 TABLET ORAL at 21:56

## 2024-12-12 RX ADMIN — SODIUM CHLORIDE 100 ML/HR: 0.9 INJECTION, SOLUTION INTRAVENOUS at 16:47

## 2024-12-12 RX ADMIN — ACETAMINOPHEN 650 MG: 325 TABLET, FILM COATED ORAL at 22:46

## 2024-12-12 RX ADMIN — BACLOFEN 10 MG: 10 TABLET ORAL at 16:46

## 2024-12-12 RX ADMIN — FUROSEMIDE 10 MG: 10 INJECTION, SOLUTION INTRAMUSCULAR; INTRAVENOUS at 16:46

## 2024-12-12 RX ADMIN — AZTREONAM 1000 MG: 1 INJECTION, POWDER, LYOPHILIZED, FOR SOLUTION INTRAMUSCULAR; INTRAVENOUS at 19:36

## 2024-12-12 RX ADMIN — SODIUM CHLORIDE 1 G: 1 TABLET ORAL at 10:15

## 2024-12-12 RX ADMIN — ONDANSETRON 4 MG: 4 TABLET, ORALLY DISINTEGRATING ORAL at 13:55

## 2024-12-12 RX ADMIN — LISINOPRIL 40 MG: 20 TABLET ORAL at 08:51

## 2024-12-12 RX ADMIN — SODIUM CHLORIDE 2 G: 1 TABLET ORAL at 22:45
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
## 2024-12-12 NOTE — ASSESSMENT & PLAN NOTE
Postoperative day 3 robotic assisted laparoscopic suprapubic prostatectomy by Dr. Mino Herman at 3:00 , afebrile since then  Reports nausea- no vomiting.   , recheck 124  WBC 14- stable  BC no growth  Preop urine cx pseudomonas and e. Coli- continue culture directed abx  CXR clear      Plan:  -Stop IV fluids  -Consult nephrology for hyponatremia  -Urine clear  -Up and ambulation as able  -Will require additional night stay due to hyponatremia

## 2024-12-12 NOTE — ASSESSMENT & PLAN NOTE
-Patient does not have a known history of hyponatremia.  - Sodium on admission was 136 on 12/9  - Sodium level slowly decreasing to 124 on 12/12  - Patient received intravenous fluids until 12/11 in the evening when the patient refused further intravenous fluids  - Serum osmolarity-pending  - Urine osmolarity-495  - Urine sodium 126  - Renal function appears to be stable and at baseline creatinine 1.1 mg/dL  - Potassium is appropriate  - Blood sugars are appropriate    Overall patient likely has inappropriate/increased ADH in the setting of nausea prior, current illness.  Has ADH presents on urinalysis.  Serum osmolarity is pending.  Clinically appears slightly volume depleted.  Sodium level did decrease further today without any intravenous fluids.  Was started on salt tablets earlier today but the patient states that he did not tolerate this and had significant nausea from this.  Overall, we will attempt to dilute the urine but also give some volume.  Therefore we will give saline and Lasix.  Avoid salt tablets for now as it is making the patient more nauseous.    Plan  - Saline for 5 hours with low-dose Lasix one-time  - Repeat lab work now and repeat lab work at 9 PM  - Hold salt tablets for now  - Call parameters placed with [facility] this evening  - Review case with primary team advanced practitioner nursing team  - Reviewed case in detail with the patient and his family at bedside  - Hold lisinopril standing dose for now.  Can restart tomorrow as long as patient is clinically stable.  Already received today's dose.  This is to avoid the patient receiving in case there is any decompensation hemodynamically    Update-sodium level 123 now.  Giving saline and Lasix now.  Repeat lab work 9 PM.  If continuing to decrease may consider 1.8% saline.

## 2024-12-12 NOTE — QUICK NOTE
Patient developed hiccups this afternoon. Did have a bowel movement this afternoon, + bowel sounds. Nursing gave zofran. I evaluated patient. Abdomen soft, nondistended. Hiccups past 1 hour. Prescribed baclofen. Nephrology is consulted as well for hyponatremia, appreciate recommendations.

## 2024-12-12 NOTE — PLAN OF CARE
Problem: PAIN - ADULT  Goal: Verbalizes/displays adequate comfort level or baseline comfort level  Description: Interventions:  - Encourage patient to monitor pain and request assistance  - Assess pain using appropriate pain scale  - Administer analgesics based on type and severity of pain and evaluate response  - Implement non-pharmacological measures as appropriate and evaluate response  - Consider cultural and social influences on pain and pain management  - Notify physician/advanced practitioner if interventions unsuccessful or patient reports new pain  Outcome: Progressing     Problem: INFECTION - ADULT  Goal: Absence or prevention of progression during hospitalization  Description: INTERVENTIONS:  - Assess and monitor for signs and symptoms of infection  - Monitor lab/diagnostic results  - Monitor all insertion sites, i.e. indwelling lines, tubes, and drains  - Monitor endotracheal if appropriate and nasal secretions for changes in amount and color  - [place] appropriate cooling/warming therapies per order  - Administer medications as ordered  - Instruct and encourage patient and family to use good hand hygiene technique  - Identify and instruct in appropriate isolation precautions for identified infection/condition  Outcome: Progressing  Goal: Absence of fever/infection during neutropenic period  Description: INTERVENTIONS:  - Monitor WBC    Outcome: Progressing     Problem: SAFETY ADULT  Goal: Patient will remain free of falls  Description: INTERVENTIONS:  - Educate patient/family on patient safety including physical limitations  - Instruct patient to call for assistance with activity   - Consult OT/PT to assist with strengthening/mobility   - Keep Call bell within reach  - Keep bed low and locked with side rails adjusted as appropriate  - Keep care items and personal belongings within reach  - Initiate and maintain comfort rounds  - Make Fall Risk Sign visible to staff  - Offer Toileting every  Hours,  in advance of need  - Initiate/Maintain alarm  - Obtain necessary fall risk management equipment:   - Apply yellow socks and bracelet for high fall risk patients  - Consider moving patient to room near nurses station  Outcome: Progressing  Goal: Maintain or return to baseline ADL function  Description: INTERVENTIONS:  -  Assess patient's ability to carry out ADLs; assess patient's baseline for ADL function and identify physical deficits which impact ability to perform ADLs (bathing, care of mouth/teeth, toileting, grooming, dressing, etc.)  - Assess/evaluate cause of self-care deficits   - Assess range of motion  - Assess patient's mobility; develop plan if impaired  - Assess patient's need for assistive devices and provide as appropriate  - Encourage maximum independence but intervene and supervise when necessary  - Involve family in performance of ADLs  - Assess for home care needs following discharge   - Consider OT consult to assist with ADL evaluation and planning for discharge  - Provide patient education as appropriate  Outcome: Progressing  Goal: Maintains/Returns to pre admission functional level  Description: INTERVENTIONS:  - Perform AM-PAC 6 Click Basic Mobility/ Daily Activity assessment daily.  - Set and communicate daily mobility goal to care team and patient/family/caregiver.   - Collaborate with rehabilitation services on mobility goals if consulted  - Perform Range of Motion  times a day.  - Reposition patient every  hours.  - Dangle patient  times a day  - Stand patient  times a day  - Ambulate patient  times a day  - Out of bed to chair  times a day   - Out of bed for meals  times a day  - Out of bed for toileting  - Record patient progress and toleration of activity level   Outcome: Progressing     Problem: DISCHARGE PLANNING  Goal: Discharge to home or other facility with appropriate resources  Description: INTERVENTIONS:  - Identify barriers to discharge w/patient and caregiver  - Arrange for  needed discharge resources and transportation as appropriate  - Identify discharge learning needs (meds, wound care, etc.)  - Arrange for interpretive services to assist at discharge as needed  - Refer to Case Management Department for coordinating discharge planning if the patient needs post-hospital services based on physician/advanced practitioner order or complex needs related to functional status, cognitive ability, or social support system  Outcome: Progressing     Problem: Knowledge Deficit  Goal: Patient/family/caregiver demonstrates understanding of disease process, treatment plan, medications, and discharge instructions  Description: Complete learning assessment and assess knowledge base.  Interventions:  - Provide teaching at level of understanding  - Provide teaching via preferred learning methods  Outcome: Progressing     Problem: Prexisting or High Potential for Compromised Skin Integrity  Goal: Skin integrity is maintained or improved  Description: INTERVENTIONS:  - Identify patients at risk for skin breakdown  - Assess and monitor skin integrity  - Assess and monitor nutrition and hydration status  - Monitor labs   - Assess for incontinence   - Turn and reposition patient  - Assist with mobility/ambulation  - Relieve pressure over bony prominences  - Avoid friction and shearing  - Provide appropriate hygiene as needed including keeping skin clean and dry  - Evaluate need for skin moisturizer/barrier cream  - Collaborate with interdisciplinary team   - Patient/family teaching  - Consider wound care consult   Outcome: Progressing

## 2024-12-13 ENCOUNTER — APPOINTMENT (INPATIENT)
Dept: ULTRASOUND IMAGING | Facility: HOSPITAL | Age: 54
DRG: 710 | End: 2024-12-13
Payer: MEDICAID

## 2024-12-13 LAB
ANION GAP SERPL CALCULATED.3IONS-SCNC: 5 MMOL/L (ref 4–13)
ANION GAP SERPL CALCULATED.3IONS-SCNC: 7 MMOL/L (ref 4–13)
ANION GAP SERPL CALCULATED.3IONS-SCNC: 7 MMOL/L (ref 4–13)
ANION GAP SERPL CALCULATED.3IONS-SCNC: 8 MMOL/L (ref 4–13)
BUN SERPL-MCNC: 17 MG/DL (ref 5–25)
BUN SERPL-MCNC: 20 MG/DL (ref 5–25)
BUN SERPL-MCNC: 22 MG/DL (ref 5–25)
BUN SERPL-MCNC: 22 MG/DL (ref 5–25)
CALCIUM SERPL-MCNC: 8.1 MG/DL (ref 8.4–10.2)
CALCIUM SERPL-MCNC: 8.3 MG/DL (ref 8.4–10.2)
CALCIUM SERPL-MCNC: 8.4 MG/DL (ref 8.4–10.2)
CALCIUM SERPL-MCNC: 8.5 MG/DL (ref 8.4–10.2)
CHLORIDE SERPL-SCNC: 93 MMOL/L (ref 96–108)
CHLORIDE SERPL-SCNC: 95 MMOL/L (ref 96–108)
CHLORIDE SERPL-SCNC: 97 MMOL/L (ref 96–108)
CHLORIDE SERPL-SCNC: 98 MMOL/L (ref 96–108)
CO2 SERPL-SCNC: 20 MMOL/L (ref 21–32)
CO2 SERPL-SCNC: 22 MMOL/L (ref 21–32)
CO2 SERPL-SCNC: 23 MMOL/L (ref 21–32)
CO2 SERPL-SCNC: 25 MMOL/L (ref 21–32)
CREAT SERPL-MCNC: 1.17 MG/DL (ref 0.6–1.3)
CREAT SERPL-MCNC: 1.25 MG/DL (ref 0.6–1.3)
CREAT SERPL-MCNC: 1.27 MG/DL (ref 0.6–1.3)
CREAT SERPL-MCNC: 1.43 MG/DL (ref 0.6–1.3)
ERYTHROCYTE [DISTWIDTH] IN BLOOD BY AUTOMATED COUNT: 12.8 % (ref 11.6–15.1)
GFR SERPL CREATININE-BSD FRML MDRD: 54 ML/MIN/1.73SQ M
GFR SERPL CREATININE-BSD FRML MDRD: 63 ML/MIN/1.73SQ M
GFR SERPL CREATININE-BSD FRML MDRD: 64 ML/MIN/1.73SQ M
GFR SERPL CREATININE-BSD FRML MDRD: 69 ML/MIN/1.73SQ M
GLUCOSE SERPL-MCNC: 113 MG/DL (ref 65–140)
GLUCOSE SERPL-MCNC: 90 MG/DL (ref 65–140)
GLUCOSE SERPL-MCNC: 97 MG/DL (ref 65–140)
GLUCOSE SERPL-MCNC: 99 MG/DL (ref 65–140)
HCT VFR BLD AUTO: 36.9 % (ref 36.5–49.3)
HGB BLD-MCNC: 12.7 G/DL (ref 12–17)
MCH RBC QN AUTO: 28.7 PG (ref 26.8–34.3)
MCHC RBC AUTO-ENTMCNC: 34.4 G/DL (ref 31.4–37.4)
MCV RBC AUTO: 84 FL (ref 82–98)
PLATELET # BLD AUTO: 233 THOUSANDS/UL (ref 149–390)
PMV BLD AUTO: 9.5 FL (ref 8.9–12.7)
POTASSIUM SERPL-SCNC: 4 MMOL/L (ref 3.5–5.3)
POTASSIUM SERPL-SCNC: 4 MMOL/L (ref 3.5–5.3)
POTASSIUM SERPL-SCNC: 4.1 MMOL/L (ref 3.5–5.3)
POTASSIUM SERPL-SCNC: 5.3 MMOL/L (ref 3.5–5.3)
RBC # BLD AUTO: 4.42 MILLION/UL (ref 3.88–5.62)
SODIUM SERPL-SCNC: 123 MMOL/L (ref 135–147)
SODIUM SERPL-SCNC: 125 MMOL/L (ref 135–147)
SODIUM SERPL-SCNC: 126 MMOL/L (ref 135–147)
SODIUM SERPL-SCNC: 126 MMOL/L (ref 135–147)
WBC # BLD AUTO: 15.2 THOUSAND/UL (ref 4.31–10.16)

## 2024-12-13 PROCEDURE — 99024 POSTOP FOLLOW-UP VISIT: CPT

## 2024-12-13 PROCEDURE — 93005 ELECTROCARDIOGRAM TRACING: CPT

## 2024-12-13 PROCEDURE — 80048 BASIC METABOLIC PNL TOTAL CA: CPT | Performed by: INTERNAL MEDICINE

## 2024-12-13 PROCEDURE — 85027 COMPLETE CBC AUTOMATED: CPT | Performed by: INTERNAL MEDICINE

## 2024-12-13 PROCEDURE — 99232 SBSQ HOSP IP/OBS MODERATE 35: CPT | Performed by: INTERNAL MEDICINE

## 2024-12-13 PROCEDURE — 80048 BASIC METABOLIC PNL TOTAL CA: CPT | Performed by: STUDENT IN AN ORGANIZED HEALTH CARE EDUCATION/TRAINING PROGRAM

## 2024-12-13 PROCEDURE — 76775 US EXAM ABDO BACK WALL LIM: CPT

## 2024-12-13 RX ORDER — SODIUM CHLORIDE, SODIUM GLUCONATE, SODIUM ACETATE, POTASSIUM CHLORIDE, MAGNESIUM CHLORIDE, SODIUM PHOSPHATE, DIBASIC, AND POTASSIUM PHOSPHATE .53; .5; .37; .037; .03; .012; .00082 G/100ML; G/100ML; G/100ML; G/100ML; G/100ML; G/100ML; G/100ML
100 INJECTION, SOLUTION INTRAVENOUS CONTINUOUS
Status: DISPENSED | OUTPATIENT
Start: 2024-12-13 | End: 2024-12-13

## 2024-12-13 RX ADMIN — SODIUM CHLORIDE, SODIUM GLUCONATE, SODIUM ACETATE, POTASSIUM CHLORIDE, MAGNESIUM CHLORIDE, SODIUM PHOSPHATE, DIBASIC, AND POTASSIUM PHOSPHATE 100 ML/HR: .53; .5; .37; .037; .03; .012; .00082 INJECTION, SOLUTION INTRAVENOUS at 15:27

## 2024-12-13 RX ADMIN — ONDANSETRON 4 MG: 4 TABLET, ORALLY DISINTEGRATING ORAL at 15:27

## 2024-12-13 RX ADMIN — BUTALBITAL, ACETAMINOPHEN, AND CAFFEINE 1 TABLET: 325; 50; 40 TABLET ORAL at 20:08

## 2024-12-13 RX ADMIN — AZTREONAM 1000 MG: 1 INJECTION, POWDER, LYOPHILIZED, FOR SOLUTION INTRAMUSCULAR; INTRAVENOUS at 19:59

## 2024-12-13 RX ADMIN — SODIUM CHLORIDE 40 ML/HR: 4 INJECTION, SOLUTION, CONCENTRATE INTRAVENOUS at 04:08

## 2024-12-13 NOTE — ASSESSMENT & PLAN NOTE
Postoperative day 4 robotic assisted laparoscopic suprapubic prostatectomy by Dr. Herzog  Fevers yesterday T max 101.5F  Na 123, nephrology on board  Leukocytosis 15  Cr 1.43, yesterday 1.2. No flank or abdominal pain. Good UOP.   Patient ate some food this AM. Reports hiccups, resolved one hour ago      Plan:  - Up and ambulatory  - Renal US today. R/u hydroneprhosis  - Appreciate nephrology assistance with hyponatremia  - Continue azactam. Trend temperatures and leukocytosis  - Will require additional hospital stay due to hyponatremia, fever.

## 2024-12-13 NOTE — PROGRESS NOTES
Progress Note - Nephrology   Name: Roby Louis 54 y.o. male I MRN: [id number]  Unit/Bed#: S -01 I Date of Admission: 12/9/2024   Date of Service: 12/13/2024 I Hospital Day: 3    Assessment & Plan  Hyponatremia  -Patient does not have a known history of hyponatremia.  - Sodium on admission was 136 on 12/9  - Sodium level slowly decreasing to 124 on 12/12  - Patient received intravenous fluids until 12/11 in the evening when the patient refused further intravenous fluids  - Serum osmolarity 262  - Urine osmolarity-495  - Urine sodium 126  - Renal function appears to be stable and at baseline creatinine 1.1 mg/dL  - Potassium is appropriate  - Blood sugars are appropriate    Overall patient likely has inappropriate/increased ADH in the setting of nausea prior, current illness.  Has ADH presents on urinalysis.  Patient did not tolerate salt tablets well.  Still with nausea.  1.8% saline was required overnight 12/12 into 12/13.  Sodium level is improving to 125.  We are waiting for repeat lab work this afternoon.  As the fluids finished at noon.    Plan  - Agree with 1.8% saline overnight and has been held since noon.  Therefore we will recheck lab work now.  - I/os; avoid nephrotoxic agents  - Continue holding ACE inhibitor  - Tachycardia-in the setting of acute illness/fevers/possible volume depletion.  Further evaluation workup in progress by primary team  - Check BMP now and further adjustments thereafte  - If remains tachycardic, no objection to volume expansion (I have ordered) and trending lab work and may use low-dose diuretic to dilute urine if needed    BPH with obstruction/lower urinary tract symptoms  -Status post prostatectomy.  Further plans per primary team  Elevated PSA  -Per primary team  Urinary retention due to benign prostatic hyperplasia  -Status post prostatectomy.  Granda catheter management per primary team  Fever  -Antibiotics broadened to Azactam  HTN (hypertension)  -On lisinopril as  outpatient  Tachycardia  -Likely in setting of fevers.  But is also having tachycardia when not febrile.  Will give intravenous fluids appears volume depleted.  Primary team may consider CT scan.    I have reviewed the nephrology recommendations including awaiting repeat lab work.  1.8% saline overnight and tachycardia, with primary team advanced practitioner, and we are in agreement with renal plan including the information outlined above.     Subjective   Brief History of Admission - 54 y.o. male with a past medical history of BPH, hypertension, who was admitted to [facility] after presenting with for elective prostatectomy. A renal consultation is requested today for assistance in the management of hyponatremia.  Patient initially presented December 9 for elective prostatectomy.  Nephrology is consulted on 12/12 for hyponatremia evaluation.  Patient seen at bedside and family present.  I offered  iPad and patient stated he did not need Frisian interpretation and he was comfortable moving forward with our interview in English.  Patient currently denies pain or discomfort.  He states his main concern is the hiccups that started approximately 2 hours prior..  The course reviewed.  Patient postoperatively was having issues with nausea, poor appetite and was noted to have leukocytosis and fevers.  Fever started approximately 12/11 early a.m.Was started on broad-spectrum antibiotics while infectious workup was being evaluated.     Patient still with nausea.  No longer with hiccups.  Blood pressures 130s.  Heart rate 120.    Objective :  Temp:  [97.6 °F (36.4 °C)-101.5 °F (38.6 °C)] 99.3 °F (37.4 °C)  HR:  [] 120  BP: (131-147)/(90-99) 131/91  Resp:  [12-16] 16  SpO2:  [96 %-98 %] 96 %  O2 Device: None (Room air)    Current Weight: Weight - Scale: 59.9 kg (132 lb 1.6 oz)  First Weight: Weight - Scale: 63.5 kg (140 lb)  I/O         12/11 0701 12/12 0700 12/12 0701 12/13 0700  12/13 0701  12/14 0700    P.O. 760 840 240    I.V. (mL/kg) 0 (0)  323.3 (5.4)    IV Piggyback 100      Total Intake(mL/kg) 860 (14.1) 840 (14) 563.3 (9.4)    Urine (mL/kg/hr) 3730 (2.5) 2125 (1.5)     Drains 5      Total Output 3735 2125     Net -2875 -1285 +563.3                 Physical Exam  General: NAD  Skin: no rash  Eyes: anicteric sclera  ENT: moist mucous membrane  Neck: supple  Chest: CTA b/l, no ronchii, no wheeze, no rubs, no rales  CVS: s1s2, no murmur, no gallop, no rub  Abdomen: soft, nontender, nl sounds, surgical sites intact  Extremities: no edema LE b/l  Neuro: AAOX3  Psych: normal affect    Medications:    Current Facility-Administered Medications:     acetaminophen (TYLENOL) tablet 650 mg, 650 mg, Oral, Q6H PRN, TONY Godoy, 650 mg at 12/12/24 2246    ALPRAZolam (XANAX) tablet 0.5 mg, 0.5 mg, Oral, BID PRN, TONY Fox    aztreonam (AZACTAM) 1,000 mg in sodium chloride 0.9 % 50 mL IVPB, 1,000 mg, Intravenous, Q24H, Viry Granado PA-C, Last Rate: 100 mL/hr at 12/12/24 1936, 1,000 mg at 12/12/24 1936    baclofen tablet 10 mg, 10 mg, Oral, TID, Viry Granado PA-C, 10 mg at 12/12/24 2156    butalbital-acetaminophen-caffeine (FIORICET,ESGIC) -40 mg per tablet 1 tablet, 1 tablet, Oral, Q4H PRN, TONY Fox, 1 tablet at 12/11/24 1711    HYDROmorphone HCl (DILAUDID) injection 0.2 mg, 0.2 mg, Intravenous, Q2H PRN, Angie Rodriguez PA-C    metoprolol (LOPRESSOR) injection 5 mg, 5 mg, Intravenous, Q6H PRN, Shania Win, TONY, 5 mg at 12/10/24 0131    naloxone (NARCAN) 0.04 mg/mL syringe 0.04 mg, 0.04 mg, Intravenous, Q1MIN PRN, Angie Rodriguez PA-C    ondansetron (ZOFRAN-ODT) dispersible tablet 4 mg, 4 mg, Oral, Q6H PRN, Angie Rodriguez PA-C, 4 mg at 12/12/24 1355    oxyCODONE (ROXICODONE) split tablet 2.5 mg, 2.5 mg, Oral, Q4H PRN **OR** oxyCODONE (ROXICODONE) IR tablet 5 mg, 5 mg, Oral, Q4H PRN, Angie Rodriguez PA-C, 5 mg at 12/10/24 0404      Lab  "Results: I have reviewed the following results:  Results from last 7 days   Lab Units 12/13/24  0934 12/13/24  0232 12/12/24 2010 12/12/24  1627 12/12/24  1159 12/12/24  0317 12/11/24  0338 12/10/24  0403 12/09/24  1600   WBC Thousand/uL  --  15.20*  --   --   --  14.90* 14.08* 15.78* 7.15   HEMOGLOBIN g/dL  --  12.7  --   --   --  11.6* 10.4* 11.8* 11.9*   HEMATOCRIT %  --  36.9  --   --   --  34.1* 30.5* 35.1* 36.3*   PLATELETS Thousands/uL  --  233  --   --   --  237 221 258 252   POTASSIUM mmol/L 4.0 5.3 3.8 4.0 4.1 3.8 3.3* 3.8 4.3   CHLORIDE mmol/L 95* 93* 93* 93* 95* 97 103 101 105   CO2 mmol/L 23 25 22 23 23 22 26 29 26   BUN mg/dL 20 17 15 13 12 10 12 17 19   CREATININE mg/dL 1.27 1.43* 1.25 1.21 1.14 0.96 0.92 1.05 1.14   CALCIUM mg/dL 8.4 8.5 8.3* 8.2* 8.2* 8.1* 8.1* 8.6 8.4   ALBUMIN g/dL  --   --   --   --   --   --  3.4*  --   --        Administrative Statements     Portions of the record may have been created with voice recognition software. Occasional wrong word or \"sound a like\" substitutions may have occurred due to the inherent limitations of voice recognition software. Read the chart carefully and recognize, using context, where substitutions have occurred.If you have any questions, please contact the dictating provider.  "
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
## 2024-12-13 NOTE — ASSESSMENT & PLAN NOTE
-Likely in setting of fevers.  But is also having tachycardia when not febrile.  Will give intravenous fluids appears volume depleted.  Primary team may consider CT scan.

## 2024-12-13 NOTE — QUICK NOTE
Discussed with nephrology, Significant SIADH etiology of hyponatremia. Patient did not tolerate salt tablets yesterday. Repeat labwork this afternoon pending. His tachycardia is reaction from fever/illness, with persistent tachycardia this afternoon, afebrile. Patient denies any chest pain, SOB. +Nausea, no vomiting.     Plan:    - EKG. If sinus rhythm, trial of fluids to treat volume depletion  - Can consider CTA to R/u PE if tachycardia persists, hold off as of now.   - Trend vitals  - US kidney bladder- no hydronephrosis and repeat BMP showed Cr downtrending.

## 2024-12-13 NOTE — PLAN OF CARE
Problem: PAIN - ADULT  Goal: Verbalizes/displays adequate comfort level or baseline comfort level  Description: Interventions:  - Encourage patient to monitor pain and request assistance  - Assess pain using appropriate pain scale  - Administer analgesics based on type and severity of pain and evaluate response  - Implement non-pharmacological measures as appropriate and evaluate response  - Consider cultural and social influences on pain and pain management  - Notify physician/advanced practitioner if interventions unsuccessful or patient reports new pain  Outcome: Progressing     Problem: INFECTION - ADULT  Goal: Absence or prevention of progression during hospitalization  Description: INTERVENTIONS:  - Assess and monitor for signs and symptoms of infection  - Monitor lab/diagnostic results  - Monitor all insertion sites, i.e. indwelling lines, tubes, and drains  - Monitor endotracheal if appropriate and nasal secretions for changes in amount and color  - [place] appropriate cooling/warming therapies per order  - Administer medications as ordered  - Instruct and encourage patient and family to use good hand hygiene technique  - Identify and instruct in appropriate isolation precautions for identified infection/condition  Outcome: Progressing  Goal: Absence of fever/infection during neutropenic period  Description: INTERVENTIONS:  - Monitor WBC    Outcome: Progressing     Problem: SAFETY ADULT  Goal: Patient will remain free of falls  Description: INTERVENTIONS:  - Educate patient/family on patient safety including physical limitations  - Instruct patient to call for assistance with activity   - Consult OT/PT to assist with strengthening/mobility   - Keep Call bell within reach  - Keep bed low and locked with side rails adjusted as appropriate  - Keep care items and personal belongings within reach  - Initiate and maintain comfort rounds  - Make Fall Risk Sign visible to staff  - Offer Toileting every  Hours,  in advance of need  - Initiate/Maintain alarm  - Obtain necessary fall risk management equipment:   - Apply yellow socks and bracelet for high fall risk patients  - Consider moving patient to room near nurses station  Outcome: Progressing  Goal: Maintain or return to baseline ADL function  Description: INTERVENTIONS:  -  Assess patient's ability to carry out ADLs; assess patient's baseline for ADL function and identify physical deficits which impact ability to perform ADLs (bathing, care of mouth/teeth, toileting, grooming, dressing, etc.)  - Assess/evaluate cause of self-care deficits   - Assess range of motion  - Assess patient's mobility; develop plan if impaired  - Assess patient's need for assistive devices and provide as appropriate  - Encourage maximum independence but intervene and supervise when necessary  - Involve family in performance of ADLs  - Assess for home care needs following discharge   - Consider OT consult to assist with ADL evaluation and planning for discharge  - Provide patient education as appropriate  Outcome: Progressing  Goal: Maintains/Returns to pre admission functional level  Description: INTERVENTIONS:  - Perform AM-PAC 6 Click Basic Mobility/ Daily Activity assessment daily.  - Set and communicate daily mobility goal to care team and patient/family/caregiver.   - Collaborate with rehabilitation services on mobility goals if consulted  - Perform Range of Motion  times a day.  - Reposition patient every  hours.  - Dangle patient  times a day  - Stand patient  times a day  - Ambulate patient  times a day  - Out of bed to chair  times a day   - Out of bed for meals  times a day  - Out of bed for toileting  - Record patient progress and toleration of activity level   Outcome: Progressing     Problem: DISCHARGE PLANNING  Goal: Discharge to home or other facility with appropriate resources  Description: INTERVENTIONS:  - Identify barriers to discharge w/patient and caregiver  - Arrange for  needed discharge resources and transportation as appropriate  - Identify discharge learning needs (meds, wound care, etc.)  - Arrange for interpretive services to assist at discharge as needed  - Refer to Case Management Department for coordinating discharge planning if the patient needs post-hospital services based on physician/advanced practitioner order or complex needs related to functional status, cognitive ability, or social support system  Outcome: Progressing     Problem: Knowledge Deficit  Goal: Patient/family/caregiver demonstrates understanding of disease process, treatment plan, medications, and discharge instructions  Description: Complete learning assessment and assess knowledge base.  Interventions:  - Provide teaching at level of understanding  - Provide teaching via preferred learning methods  Outcome: Progressing     Problem: Prexisting or High Potential for Compromised Skin Integrity  Goal: Skin integrity is maintained or improved  Description: INTERVENTIONS:  - Identify patients at risk for skin breakdown  - Assess and monitor skin integrity  - Assess and monitor nutrition and hydration status  - Monitor labs   - Assess for incontinence   - Turn and reposition patient  - Assist with mobility/ambulation  - Relieve pressure over bony prominences  - Avoid friction and shearing  - Provide appropriate hygiene as needed including keeping skin clean and dry  - Evaluate need for skin moisturizer/barrier cream  - Collaborate with interdisciplinary team   - Patient/family teaching  - Consider wound care consult   Outcome: Progressing

## 2024-12-14 ENCOUNTER — APPOINTMENT (INPATIENT)
Dept: CT IMAGING | Facility: HOSPITAL | Age: 54
DRG: 710 | End: 2024-12-14
Payer: MEDICAID

## 2024-12-14 LAB
ANION GAP SERPL CALCULATED.3IONS-SCNC: 7 MMOL/L (ref 4–13)
ANION GAP SERPL CALCULATED.3IONS-SCNC: 8 MMOL/L (ref 4–13)
BUN SERPL-MCNC: 18 MG/DL (ref 5–25)
BUN SERPL-MCNC: 21 MG/DL (ref 5–25)
CALCIUM SERPL-MCNC: 8.1 MG/DL (ref 8.4–10.2)
CALCIUM SERPL-MCNC: 8.2 MG/DL (ref 8.4–10.2)
CHLORIDE SERPL-SCNC: 96 MMOL/L (ref 96–108)
CHLORIDE SERPL-SCNC: 97 MMOL/L (ref 96–108)
CO2 SERPL-SCNC: 20 MMOL/L (ref 21–32)
CO2 SERPL-SCNC: 22 MMOL/L (ref 21–32)
CREAT SERPL-MCNC: 0.97 MG/DL (ref 0.6–1.3)
CREAT SERPL-MCNC: 1.31 MG/DL (ref 0.6–1.3)
ERYTHROCYTE [DISTWIDTH] IN BLOOD BY AUTOMATED COUNT: 12.9 % (ref 11.6–15.1)
GFR SERPL CREATININE-BSD FRML MDRD: 60 ML/MIN/1.73SQ M
GFR SERPL CREATININE-BSD FRML MDRD: 87 ML/MIN/1.73SQ M
GLUCOSE SERPL-MCNC: 216 MG/DL (ref 65–140)
GLUCOSE SERPL-MCNC: 84 MG/DL (ref 65–140)
HCT VFR BLD AUTO: 31.9 % (ref 36.5–49.3)
HGB BLD-MCNC: 11.3 G/DL (ref 12–17)
MAGNESIUM SERPL-MCNC: 2.1 MG/DL (ref 1.9–2.7)
MCH RBC QN AUTO: 29.4 PG (ref 26.8–34.3)
MCHC RBC AUTO-ENTMCNC: 35.4 G/DL (ref 31.4–37.4)
MCV RBC AUTO: 83 FL (ref 82–98)
PHOSPHATE SERPL-MCNC: 2.7 MG/DL (ref 2.7–4.5)
PLATELET # BLD AUTO: 237 THOUSANDS/UL (ref 149–390)
PMV BLD AUTO: 9.8 FL (ref 8.9–12.7)
POTASSIUM SERPL-SCNC: 4 MMOL/L (ref 3.5–5.3)
POTASSIUM SERPL-SCNC: 4.1 MMOL/L (ref 3.5–5.3)
RBC # BLD AUTO: 3.84 MILLION/UL (ref 3.88–5.62)
SODIUM SERPL-SCNC: 125 MMOL/L (ref 135–147)
SODIUM SERPL-SCNC: 125 MMOL/L (ref 135–147)
WBC # BLD AUTO: 11.49 THOUSAND/UL (ref 4.31–10.16)

## 2024-12-14 PROCEDURE — 93005 ELECTROCARDIOGRAM TRACING: CPT

## 2024-12-14 PROCEDURE — 85027 COMPLETE CBC AUTOMATED: CPT | Performed by: INTERNAL MEDICINE

## 2024-12-14 PROCEDURE — 83735 ASSAY OF MAGNESIUM: CPT | Performed by: INTERNAL MEDICINE

## 2024-12-14 PROCEDURE — 99255 IP/OBS CONSLTJ NEW/EST HI 80: CPT

## 2024-12-14 PROCEDURE — 84100 ASSAY OF PHOSPHORUS: CPT | Performed by: INTERNAL MEDICINE

## 2024-12-14 PROCEDURE — 80048 BASIC METABOLIC PNL TOTAL CA: CPT | Performed by: INTERNAL MEDICINE

## 2024-12-14 PROCEDURE — 74177 CT ABD & PELVIS W/CONTRAST: CPT

## 2024-12-14 PROCEDURE — 99233 SBSQ HOSP IP/OBS HIGH 50: CPT | Performed by: INTERNAL MEDICINE

## 2024-12-14 PROCEDURE — 99024 POSTOP FOLLOW-UP VISIT: CPT | Performed by: NURSE PRACTITIONER

## 2024-12-14 PROCEDURE — 71275 CT ANGIOGRAPHY CHEST: CPT

## 2024-12-14 RX ORDER — SODIUM CHLORIDE, SODIUM GLUCONATE, SODIUM ACETATE, POTASSIUM CHLORIDE, MAGNESIUM CHLORIDE, SODIUM PHOSPHATE, DIBASIC, AND POTASSIUM PHOSPHATE .53; .5; .37; .037; .03; .012; .00082 G/100ML; G/100ML; G/100ML; G/100ML; G/100ML; G/100ML; G/100ML
50 INJECTION, SOLUTION INTRAVENOUS CONTINUOUS
Status: DISPENSED | OUTPATIENT
Start: 2024-12-14 | End: 2024-12-14

## 2024-12-14 RX ORDER — METOCLOPRAMIDE HYDROCHLORIDE 5 MG/ML
10 INJECTION INTRAMUSCULAR; INTRAVENOUS EVERY 6 HOURS PRN
Status: DISCONTINUED | OUTPATIENT
Start: 2024-12-14 | End: 2024-12-16 | Stop reason: HOSPADM

## 2024-12-14 RX ADMIN — METOPROLOL TARTRATE 5 MG: 1 INJECTION, SOLUTION INTRAVENOUS at 22:47

## 2024-12-14 RX ADMIN — ONDANSETRON 4 MG: 4 TABLET, ORALLY DISINTEGRATING ORAL at 04:27

## 2024-12-14 RX ADMIN — METOCLOPRAMIDE 10 MG: 5 INJECTION, SOLUTION INTRAMUSCULAR; INTRAVENOUS at 18:30

## 2024-12-14 RX ADMIN — AZTREONAM 1000 MG: 1 INJECTION, POWDER, LYOPHILIZED, FOR SOLUTION INTRAMUSCULAR; INTRAVENOUS at 18:29

## 2024-12-14 RX ADMIN — Medication 7.5 MG: at 13:25

## 2024-12-14 RX ADMIN — IOHEXOL 80 ML: 350 INJECTION, SOLUTION INTRAVENOUS at 15:00

## 2024-12-14 NOTE — ASSESSMENT & PLAN NOTE
Likely inappropriate/increased ADH   Did not tolerate prescribed salt tablets  Per nephrology--fluid management, tolvaptan x 1 dose hold ACE inhibitors  --Comanagement well-appreciated.

## 2024-12-14 NOTE — ASSESSMENT & PLAN NOTE
S/p robotic assisted laparoscopic suprapubic prostatectomy by Dr. Herzog 12/9  With febrile--likely due to  source given months of catheter dependency.  Tmax 100.3.  Patient carries low-grade temperature.  Most recent evaluation however 98.8.  Chest x-ray negative for atelectasis or pneumonia  Renal ultrasound negative for hydronephrosis  CT PE protocol and abdomen and pelvis negative for embolus,  or GI tract obstruction, fluid collection, nephroureterolithiasis, bleeding, or urine leak.  Granda in situ--900 cc so far during day shift    Plan:  Incentive spirometry  Ambulation  Hydration  Antibiosis  Trend labs  Appreciate nephrologic and internal medicine input.  Maintain Granda catheter to straight drainage for postoperative requirement.

## 2024-12-14 NOTE — ASSESSMENT & PLAN NOTE
-Patient does not have a known history of hyponatremia.  - Sodium on admission was 136 on 12/9  - Sodium level slowly decreasing to 124 on 12/12  - Patient received intravenous fluids until 12/11 in the evening when the patient refused further intravenous fluids  - Serum osmolarity 262  - Urine osmolarity-495  - Urine sodium 126  - Renal function appears to be stable and at baseline creatinine 1.1 mg/dL  - Potassium is appropriate  - Blood sugars are appropriate  -Renal function-patient briefly had a creatinine rise to 1.4 which has improved back to baseline.  Trial of fluids was given on 12/13 also with isotonic fluids due to tachycardia.    Overall patient likely has inappropriate/increased ADH in the setting of nausea, current illness.  Has ADH presents on urinalysis.  Patient did not tolerate salt tablets well.  Still with nausea.  1.8% saline was required overnight 12/12 into 12/13.  Sodium level is improving to 125.  And subsequently improved to 126 on 12/13 in the afternoon.  We gave a trial of isotonic fluids on 12/13 in the evening due to possible volume depletion and tachycardia.  Sodium level decreased to 125 on 12/14.  Still appears relatively euvolemic but mildly tachycardic.  Will give tolvaptan one-time.    Plan  - Give tolvaptan  - I/os; avoid nephrotoxic agents  - Continue holding ACE inhibitor  - Tachycardia-in the setting of acute illness/fevers/possible volume depletion.  Further evaluation workup in progress by primary team.  Will give another trial of intravenous fluids since the patient does appear to have responded yesterday with this.  Primary team will evaluate today and reassess for CT scan and therefore the fluids will also assist with renal protection if CT scan is needed  - Giving low-dose intravenous fluids to avoid rapid correction and sodium as the patient was given tolvaptan earlier  - BMP this evening  - Call parameters with BMP  - Free water intake can be liberalized and I advised  the patient to drink water to thirst

## 2024-12-14 NOTE — ASSESSMENT & PLAN NOTE
Sinus tachycardia on EKG  Consult internal medicine for evaluation.  Obtained CT PE protocol late this afternoon--negative for embolus

## 2024-12-14 NOTE — ASSESSMENT & PLAN NOTE
Patient status post prostatectomy.  This was done on 12/12/2024.  Urology is the primary.  No hydronephrosis on bladder ultrasound.

## 2024-12-14 NOTE — ASSESSMENT & PLAN NOTE
Patient has tachycardia to 116  Initially thought to have urinary tract infection.  Was changed to aztreonam on 12/11/2024  Denies any chest pain, shortness of breath, urinary discomfort.  Patient getting aztreonam 1000 mg once daily and multi light for 6 hours  Agree with CT abdomen pelvis is done and pending read  On Granda catheter.

## 2024-12-14 NOTE — ASSESSMENT & PLAN NOTE
Last fever on 12/12/2024.  No clear source of infection.  Has received 5-day course of antibiotic.

## 2024-12-14 NOTE — PLAN OF CARE
Problem: INFECTION - ADULT  Goal: Absence or prevention of progression during hospitalization  Description: INTERVENTIONS:  - Assess and monitor for signs and symptoms of infection  - Monitor lab/diagnostic results  - Monitor all insertion sites, i.e. indwelling lines, tubes, and drains  - Monitor endotracheal if appropriate and nasal secretions for changes in amount and color  - [place] appropriate cooling/warming therapies per order  - Administer medications as ordered  - Instruct and encourage patient and family to use good hand hygiene technique  - Identify and instruct in appropriate isolation precautions for identified infection/condition  Outcome: Progressing  Goal: Absence of fever/infection during neutropenic period  Description: INTERVENTIONS:  - Monitor WBC    Outcome: Progressing     Problem: PAIN - ADULT  Goal: Verbalizes/displays adequate comfort level or baseline comfort level  Description: Interventions:  - Encourage patient to monitor pain and request assistance  - Assess pain using appropriate pain scale  - Administer analgesics based on type and severity of pain and evaluate response  - Implement non-pharmacological measures as appropriate and evaluate response  - Consider cultural and social influences on pain and pain management  - Notify physician/advanced practitioner if interventions unsuccessful or patient reports new pain  Outcome: Progressing     Problem: SAFETY ADULT  Goal: Maintain or return to baseline ADL function  Description: INTERVENTIONS:  -  Assess patient's ability to carry out ADLs; assess patient's baseline for ADL function and identify physical deficits which impact ability to perform ADLs (bathing, care of mouth/teeth, toileting, grooming, dressing, etc.)  - Assess/evaluate cause of self-care deficits   - Assess range of motion  - Assess patient's mobility; develop plan if impaired  - Assess patient's need for assistive devices and provide as appropriate  - Encourage  maximum independence but intervene and supervise when necessary  - Involve family in performance of ADLs  - Assess for home care needs following discharge   - Consider OT consult to assist with ADL evaluation and planning for discharge  - Provide patient education as appropriate  Outcome: Progressing     Problem: Knowledge Deficit  Goal: Patient/family/caregiver demonstrates understanding of disease process, treatment plan, medications, and discharge instructions  Description: Complete learning assessment and assess knowledge base.  Interventions:  - Provide teaching at level of understanding  - Provide teaching via preferred learning methods  Outcome: Progressing
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
## 2024-12-14 NOTE — ASSESSMENT & PLAN NOTE
Recent Labs     12/13/24  1510 12/13/24  2229 12/14/24  0752   SODIUM 126* 126* 125*     Lab Results   Component Value Date    OSMOUA 495 12/12/2024    NAUR 126 12/12/2024    OSMOLALITSER 262 (L) 12/12/2024   Likely secondary to SIADH.  Nephrology is managing this 1.  Patient is set to receive tolvaptan today and trial of IV fluids.  Continuing to hold ACE inhibitor.

## 2024-12-14 NOTE — ASSESSMENT & PLAN NOTE
Surgical tissue sample sent to pathology--still pending.  Will review an outpatient visit.   normal...

## 2024-12-14 NOTE — PROGRESS NOTES
Progress Note - Nephrology   Name: Roby Louis 54 y.o. male I MRN: [id number]  Unit/Bed#: S -01 I Date of Admission: 12/9/2024   Date of Service: 12/14/2024 I Hospital Day: 4    Assessment & Plan  Hyponatremia  -Patient does not have a known history of hyponatremia.  - Sodium on admission was 136 on 12/9  - Sodium level slowly decreasing to 124 on 12/12  - Patient received intravenous fluids until 12/11 in the evening when the patient refused further intravenous fluids  - Serum osmolarity 262  - Urine osmolarity-495  - Urine sodium 126  - Renal function appears to be stable and at baseline creatinine 1.1 mg/dL  - Potassium is appropriate  - Blood sugars are appropriate  -Renal function-patient briefly had a creatinine rise to 1.4 which has improved back to baseline.  Trial of fluids was given on 12/13 also with isotonic fluids due to tachycardia.    Overall patient likely has inappropriate/increased ADH in the setting of nausea, current illness.  Has ADH presents on urinalysis.  Patient did not tolerate salt tablets well.  Still with nausea.  1.8% saline was required overnight 12/12 into 12/13.  Sodium level is improving to 125.  And subsequently improved to 126 on 12/13 in the afternoon.  We gave a trial of isotonic fluids on 12/13 in the evening due to possible volume depletion and tachycardia.  Sodium level decreased to 125 on 12/14.  Still appears relatively euvolemic but mildly tachycardic.  Will give tolvaptan one-time.    Plan  - Give tolvaptan  - I/os; avoid nephrotoxic agents  - Continue holding ACE inhibitor  - Tachycardia-in the setting of acute illness/fevers/possible volume depletion.  Further evaluation workup in progress by primary team.  Will give another trial of intravenous fluids since the patient does appear to have responded yesterday with this.  Primary team will evaluate today and reassess for CT scan and therefore the fluids will also assist with renal protection if CT scan is  needed  - Giving low-dose intravenous fluids to avoid rapid correction and sodium as the patient was given tolvaptan earlier  - BMP this evening  - Call parameters with BMP  - Free water intake can be liberalized and I advised the patient to drink water to thirst    BPH with obstruction/lower urinary tract symptoms  -Status post prostatectomy.  Further plans per primary team  Elevated PSA  -Per primary team  Urinary retention due to benign prostatic hyperplasia  -Status post prostatectomy.  Granda catheter management per primary team  Fever  -Antibiotics broadened to Azactam  HTN (hypertension)  -On lisinopril as outpatient  Tachycardia  -Likely in setting of fevers.  But is also having tachycardia when not febrile.  Will give intravenous fluids appears volume depleted.  Primary team may consider CT scan.    I have reviewed the nephrology recommendations including intravenous fluids and tolvaptan, with primary team advanced practitioner, and we are in agreement with renal plan including the information outlined above.     Subjective   Brief History of Admission - 54 y.o. male with a past medical history of BPH, hypertension, who was admitted to [facility] after presenting with for elective prostatectomy. A renal consultation is requested today for assistance in the management of hyponatremia.  Patient initially presented December 9 for elective prostatectomy.  Nephrology is consulted on 12/12 for hyponatremia evaluation.  Patient seen at bedside and family present.  I offered  iPad and patient stated he did not need Haitian interpretation and he was comfortable moving forward with our interview in English.  Patient currently denies pain or discomfort.  He states his main concern is the hiccups that started approximately 2 hours prior..  The course reviewed.  Patient postoperatively was having issues with nausea, poor appetite and was noted to have leukocytosis and fevers.  Fever  started approximately 12/11 early a.m.Was started on broad-spectrum antibiotics while infectious workup was being evaluated.     Patient still with nausea    Objective :  Temp:  [98.8 °F (37.1 °C)-100.3 °F (37.9 °C)] 98.8 °F (37.1 °C)  HR:  [] 116  BP: (123-131)/(85-91) 131/91  Resp:  [16-18] 17  SpO2:  [96 %-99 %] 97 %    Current Weight: Weight - Scale: 59.8 kg (131 lb 13.4 oz)  First Weight: Weight - Scale: 63.5 kg (140 lb)  I/O         12/12 0701 12/13 0700 12/13 0701 12/14 0700 12/14 0701  12/15 0700    P.O. 840 480     I.V. (mL/kg)  775 (13)     IV Piggyback       Total Intake(mL/kg) 840 (14) 1255 (21)     Urine (mL/kg/hr) 2125 (1.5) 300 (0.2) 900 (2.2)    Drains       Total Output 2125 300 900    Net -1285 +955 -900                 Physical Exam  General: NAD  Skin: no rash  Eyes: anicteric sclera  ENT: Dry mucous membrane  Neck: supple  Chest: CTA b/l, no ronchii, no wheeze, no rubs, no rales  CVS: s1s2, no murmur, no gallop, no rub  Abdomen: soft, nontender, nl sounds  Extremities: no edema LE b/l  : Positive littlejohn   Neuro: AAOX3  Psych: normal affect    Medications:    Current Facility-Administered Medications:     acetaminophen (TYLENOL) tablet 650 mg, 650 mg, Oral, Q6H PRN, TONY Godoy, 650 mg at 12/12/24 2246    ALPRAZolam (XANAX) tablet 0.5 mg, 0.5 mg, Oral, BID PRN, TONY Fox    aztreonam (AZACTAM) 1,000 mg in sodium chloride 0.9 % 50 mL IVPB, 1,000 mg, Intravenous, Q24H, Viry Granado PA-C, Last Rate: 100 mL/hr at 12/13/24 1959, 1,000 mg at 12/13/24 1959    butalbital-acetaminophen-caffeine (FIORICET,ESGIC) -40 mg per tablet 1 tablet, 1 tablet, Oral, Q4H PRN, TONY Fox, 1 tablet at 12/13/24 2008    HYDROmorphone HCl (DILAUDID) injection 0.2 mg, 0.2 mg, Intravenous, Q2H PRN, Angie Rodriguez PA-C    metoclopramide (REGLAN) injection 10 mg, 10 mg, Intravenous, Q6H PRN, TONY Fox    metoprolol (LOPRESSOR) injection 5 mg, 5 mg, Intravenous,  "Q6H PRN, Shania Win, CRNP, 5 mg at 12/10/24 0131    naloxone (NARCAN) 0.04 mg/mL syringe 0.04 mg, 0.04 mg, Intravenous, Q1MIN PRN, Angie Rodriguez PA-C    ondansetron (ZOFRAN-ODT) dispersible tablet 4 mg, 4 mg, Oral, Q6H PRN, Angie Rodriguez PA-C, 4 mg at 12/14/24 0427    oxyCODONE (ROXICODONE) split tablet 2.5 mg, 2.5 mg, Oral, Q4H PRN **OR** oxyCODONE (ROXICODONE) IR tablet 5 mg, 5 mg, Oral, Q4H PRN, Angie Rodriguez PA-C, 5 mg at 12/10/24 0404      Lab Results: I have reviewed the following results:  Results from last 7 days   Lab Units 12/14/24  0752 12/13/24  2229 12/13/24  1510 12/13/24  0934 12/13/24  0232 12/12/24 2010 12/12/24  1627 12/12/24  1159 12/12/24  0317 12/11/24  0338 12/10/24  0403 12/09/24  1600   WBC Thousand/uL 11.49*  --   --   --  15.20*  --   --   --  14.90* 14.08* 15.78* 7.15   HEMOGLOBIN g/dL 11.3*  --   --   --  12.7  --   --   --  11.6* 10.4* 11.8* 11.9*   HEMATOCRIT % 31.9*  --   --   --  36.9  --   --   --  34.1* 30.5* 35.1* 36.3*   PLATELETS Thousands/uL 237  --   --   --  233  --   --   --  237 221 258 252   POTASSIUM mmol/L 4.1 4.0 4.1 4.0 5.3 3.8 4.0   < > 3.8 3.3* 3.8 4.3   CHLORIDE mmol/L 97 97 98 95* 93* 93* 93*   < > 97 103 101 105   CO2 mmol/L 20* 22 20* 23 25 22 23   < > 22 26 29 26   BUN mg/dL 21 22 22 20 17 15 13   < > 10 12 17 19   CREATININE mg/dL 0.97 1.25 1.17 1.27 1.43* 1.25 1.21   < > 0.96 0.92 1.05 1.14   CALCIUM mg/dL 8.1* 8.3* 8.1* 8.4 8.5 8.3* 8.2*   < > 8.1* 8.1* 8.6 8.4   MAGNESIUM mg/dL 2.1  --   --   --   --   --   --   --   --   --   --   --    PHOSPHORUS mg/dL 2.7  --   --   --   --   --   --   --   --   --   --   --    ALBUMIN g/dL  --   --   --   --   --   --   --   --   --  3.4*  --   --     < > = values in this interval not displayed.       Administrative Statements     Portions of the record may have been created with voice recognition software. Occasional wrong word or \"sound a like\" substitutions may have occurred due to the inherent " limitations of voice recognition software. Read the chart carefully and recognize, using context, where substitutions have occurred.If you have any questions, please contact the dictating provider.

## 2024-12-14 NOTE — PROGRESS NOTES
Progress Note - Urology   Name: Roby Loius 54 y.o. male I MRN: [id number]  Unit/Bed#: S -01 I Date of Admission: 12/9/2024   Date of Service: 12/14/2024 I Hospital Day: 4    Assessment & Plan  BPH with obstruction/lower urinary tract symptoms  S/p robotic assisted laparoscopic suprapubic prostatectomy by Dr. Herzog 12/9  With febrile--likely due to  source given months of catheter dependency.  Tmax 100.3.  Patient carries low-grade temperature.  Most recent evaluation however 98.8.  Chest x-ray negative for atelectasis or pneumonia  Renal ultrasound negative for hydronephrosis  CT PE protocol and abdomen and pelvis negative for embolus,  or GI tract obstruction, fluid collection, nephroureterolithiasis, bleeding, or urine leak.  Granda in situ--900 cc so far during day shift    Plan:  Incentive spirometry  Ambulation  Hydration  Antibiosis  Trend labs  Appreciate nephrologic and internal medicine input.  Maintain Granda catheter to straight drainage for postoperative requirement.    Fever  As above.  Likely  source.  Continue Azactam.  Tachycardia  Sinus tachycardia on EKG  Consult internal medicine for evaluation.  Obtained CT PE protocol late this afternoon--negative for embolus  Elevated PSA  Surgical tissue sample sent to pathology--still pending.  Will review an outpatient visit.  Hyponatremia   Likely inappropriate/increased ADH   Did not tolerate prescribed salt tablets  Per nephrology--fluid management, tolvaptan x 1 dose hold ACE inhibitors  --Comanagement well-appreciated.          Subjective   Roby Louis  Is a 54-year-old male with BPH, status post robotic simple prostatectomy, complicated by persistent nausea and postoperative fever.  Patient defervesced well on Azactam.  Chest x-ray negative for acute cardiopulmonary source, ultrasound negative for hydronephrosis and CT today obtained for persistent tachycardia ruled out PE and/or other GI or  tract abnormality inconsistent with recent  surgical procedure.    Patient tolerating volumes of clear liquids but offers complaints of nausea with solids.  Ambulating ad francesca.  Denies chest pain, shortness of breath or increased work of breathing.  Oxygen saturations in the high 90s    Objective :  Temp:  [98.8 °F (37.1 °C)-100.3 °F (37.9 °C)] 99.8 °F (37.7 °C)  HR:  [] 104  BP: (123-159)/(85-99) 159/99  Resp:  [16-18] 18  SpO2:  [96 %-99 %] 97 %    I/O         12/12 0701 12/13 0700 12/13 0701 12/14 0700 12/14 0701  12/15 0700    P.O. 840 480     I.V. (mL/kg)  775 (13)     IV Piggyback       Total Intake(mL/kg) 840 (14) 1255 (21)     Urine (mL/kg/hr) 2125 (1.5) 300 (0.2) 900 (1.7)    Drains       Total Output 2125 300 900    Net -1285 +955 -900                 Lines/Drains/Airways       Active Status       Name Placement date Placement time Site Days    Urethral Catheter Three way;Non-latex;Other (Comment) 24 Fr. 12/09/24  1255  Three way;Non-latex;Other (Comment)  5                  Physical Exam  General appearance: alert and oriented, in no acute distress, appears stated age, cooperative, and no distress  Head: Normocephalic, without obvious abnormality, atraumatic  Neck: no JVD and supple, symmetrical, trachea midline  Lungs: diminished breath sounds  Heart: regular rate and rhythm, S1, S2 normal, no murmur, click, rub or gallop  Abdomen: abnormal findings:  mild and Dermabond--clean dry and intact, well-approximated, no signs of dehiscence or crepitus. tenderness in the lower abdomen  Extremities: extremities normal, warm and well-perfused; no cyanosis, clubbing, or edema  Pulses: 2+ and symmetric  Neurologic: Grossly normal  Granda--tristian      Lab Results: I have reviewed the following results:  Recent Labs     12/14/24  0752   WBC 11.49*   HGB 11.3*   HCT 31.9*      SODIUM 125*   K 4.1   CL 97   CO2 20*   BUN 21   CREATININE 0.97   GLUC 84   MG 2.1   PHOS 2.7       Imaging Results Review: I reviewed radiology reports from this admission  including: CT abdomen/pelvis.    Procedure Component Value Units Date/Time   CT pe study w abdomen pelvis w contrast [[id number]] Collected: 12/14/24 1603   Order Status: Completed Updated: 12/14/24 1625   Narrative:     CT PULMONARY ANGIOGRAM OF THE CHEST AND CT ABDOMEN AND PELVIS WITH INTRAVENOUS CONTRAST    INDICATION: Persistent tachycardia post pelvic surgery.    COMPARISON: Ultrasound dated 12/13/2024    TECHNIQUE: CT examination of the chest, abdomen and pelvis was performed. Thin section CT angiographic technique was used in the chest in order to evaluate for pulmonary embolus and coronal 3D MIP postprocessing was performed on the acquisition scanner.  Multiplanar 2D reformatted images were created from the source data.    This examination, like all CT scans performed in the [facility], was performed utilizing techniques to minimize radiation dose exposure, including the use of iterative reconstruction and automated exposure control. Radiation dose length  product (DLP) for this visit: 654.33 mGy-cm    IV Contrast: 80 mL of iohexol (OMNIPAQUE)  Enteric Contrast: Not administered.    Motion degraded evaluation of the lower chest and upper abdomen.    FINDINGS:    CHEST    PULMONARY ARTERIAL TREE: The evaluation of the subsegmental branches in both lung bases is limited by motion artifact. No central, lobar, or segmental embolus seen.    LUNGS: Lungs are clear. No tracheal or endobronchial lesion.    PLEURA: Unremarkable.    HEART/AORTA: Heart is unremarkable for patient's age. No thoracic aortic aneurysm.    MEDIASTINUM AND WAYNE: Unremarkable.    CHEST WALL AND LOWER NECK: Unremarkable.    ABDOMEN    LIVER/BILIARY TREE: Unremarkable.    GALLBLADDER: No calcified gallstones. No pericholecystic inflammatory change.    SPLEEN: Unremarkable.    PANCREAS: Unremarkable.    ADRENAL GLANDS: Unremarkable.    KIDNEYS/URETERS: 1.8 cm exophytic right lower pole renal cyst is poorly evaluated due to motion  but appears increased in size compared to 1.5 cm previously. There is no hydronephrosis or definite solid renal mass. There is mild bilateral urothelial  enhancement.    STOMACH AND BOWEL: Unremarkable.    APPENDIX: No findings to suggest appendicitis.    ABDOMINOPELVIC CAVITY: Trace free air is likely postoperative. No lymphadenopathy.    VESSELS: Unremarkable for patient's age.    PELVIS    REPRODUCTIVE ORGANS: Interval prostatectomy. There is a fluid collection in the prostatectomy bed which is nonspecific. This measures 4.3 cm transverse by 3.2 cm AP by 1.1 cm craniocaudal.    URINARY BLADDER: Partially decompressed by Granda catheter with pronounced wall thickening. This has progressed in the interim.    ABDOMINAL WALL/INGUINAL REGIONS: Unremarkable.    BONES: No acute fracture or suspicious osseous lesion.   Impression:       1. Evaluation of the lung bases is limited due to motion artifact. There is no central, lobar, or segmental pulmonary embolus.  2. Evidence of prostatectomy. There is a thin fluid collection in the prostatectomy bed which is nonspecific. Superimposed infection is best excluded clinically. Consider attention on follow-up.  3. Circumferential bladder wall thickening and bilateral urothelial enhancement. Urinary tract infection is not excluded. Consider urinalysis to correlate for urinary tract infection.  4. Exophytic 1.8 cm right lower pole cyst is poorly characterized on this exam due to motion but appears to reflect a simple cyst on recent ultrasound.      Workstation performed: [id number]

## 2024-12-14 NOTE — ASSESSMENT & PLAN NOTE
-Likely in setting of fevers.  But is also having tachycardia when not febrile.  Will give intravenous fluids appears volume depleted.  Primary team may consider CT scan.   Additional Complaints

## 2024-12-14 NOTE — PLAN OF CARE
Problem: PAIN - ADULT  Goal: Verbalizes/displays adequate comfort level or baseline comfort level  Description: Interventions:  - Encourage patient to monitor pain and request assistance  - Assess pain using appropriate pain scale  - Administer analgesics based on type and severity of pain and evaluate response  - Implement non-pharmacological measures as appropriate and evaluate response  - Consider cultural and social influences on pain and pain management  - Notify physician/advanced practitioner if interventions unsuccessful or patient reports new pain  Outcome: Progressing     Problem: INFECTION - ADULT  Goal: Absence or prevention of progression during hospitalization  Description: INTERVENTIONS:  - Assess and monitor for signs and symptoms of infection  - Monitor lab/diagnostic results  - Monitor all insertion sites, i.e. indwelling lines, tubes, and drains  - Monitor endotracheal if appropriate and nasal secretions for changes in amount and color  - Union appropriate cooling/warming therapies per order  - Administer medications as ordered  - Instruct and encourage patient and family to use good hand hygiene technique  - Identify and instruct in appropriate isolation precautions for identified infection/condition  Outcome: Progressing  Goal: Absence of fever/infection during neutropenic period  Description: INTERVENTIONS:  - Monitor WBC    Outcome: Progressing     Problem: SAFETY ADULT  Goal: Patient will remain free of falls  Description: INTERVENTIONS:  - Educate patient/family on patient safety including physical limitations  - Instruct patient to call for assistance with activity   - Consult OT/PT to assist with strengthening/mobility   - Keep Call bell within reach  - Keep bed low and locked with side rails adjusted as appropriate  - Keep care items and personal belongings within reach  - Initiate and maintain comfort rounds  - Make Fall Risk Sign visible to staff  - Offer Toileting every  Hours,  in advance of need  - Initiate/Maintain alarm  - Obtain necessary fall risk management equipment:   - Apply yellow socks and bracelet for high fall risk patients  - Consider moving patient to room near nurses station  Outcome: Progressing  Goal: Maintain or return to baseline ADL function  Description: INTERVENTIONS:  -  Assess patient's ability to carry out ADLs; assess patient's baseline for ADL function and identify physical deficits which impact ability to perform ADLs (bathing, care of mouth/teeth, toileting, grooming, dressing, etc.)  - Assess/evaluate cause of self-care deficits   - Assess range of motion  - Assess patient's mobility; develop plan if impaired  - Assess patient's need for assistive devices and provide as appropriate  - Encourage maximum independence but intervene and supervise when necessary  - Involve family in performance of ADLs  - Assess for home care needs following discharge   - Consider OT consult to assist with ADL evaluation and planning for discharge  - Provide patient education as appropriate  Outcome: Progressing  Goal: Maintains/Returns to pre admission functional level  Description: INTERVENTIONS:  - Perform AM-PAC 6 Click Basic Mobility/ Daily Activity assessment daily.  - Set and communicate daily mobility goal to care team and patient/family/caregiver.   - Collaborate with rehabilitation services on mobility goals if consulted  - Perform Range of Motion  times a day.  - Reposition patient every  hours.  - Dangle patient  times a day  - Stand patient  times a day  - Ambulate patient  times a day  - Out of bed to chair  times a day   - Out of bed for meals  times a day  - Out of bed for toileting  - Record patient progress and toleration of activity level   Outcome: Progressing     Problem: DISCHARGE PLANNING  Goal: Discharge to home or other facility with appropriate resources  Description: INTERVENTIONS:  - Identify barriers to discharge w/patient and caregiver  - Arrange for  needed discharge resources and transportation as appropriate  - Identify discharge learning needs (meds, wound care, etc.)  - Arrange for interpretive services to assist at discharge as needed  - Refer to Case Management Department for coordinating discharge planning if the patient needs post-hospital services based on physician/advanced practitioner order or complex needs related to functional status, cognitive ability, or social support system  Outcome: Progressing     Problem: Knowledge Deficit  Goal: Patient/family/caregiver demonstrates understanding of disease process, treatment plan, medications, and discharge instructions  Description: Complete learning assessment and assess knowledge base.  Interventions:  - Provide teaching at level of understanding  - Provide teaching via preferred learning methods  Outcome: Progressing     Problem: Prexisting or High Potential for Compromised Skin Integrity  Goal: Skin integrity is maintained or improved  Description: INTERVENTIONS:  - Identify patients at risk for skin breakdown  - Assess and monitor skin integrity  - Assess and monitor nutrition and hydration status  - Monitor labs   - Assess for incontinence   - Turn and reposition patient  - Assist with mobility/ambulation  - Relieve pressure over bony prominences  - Avoid friction and shearing  - Provide appropriate hygiene as needed including keeping skin clean and dry  - Evaluate need for skin moisturizer/barrier cream  - Collaborate with interdisciplinary team   - Patient/family teaching  - Consider wound care consult   Outcome: Progressing     Problem: GENITOURINARY - ADULT  Goal: Maintains or returns to baseline urinary function  Description: INTERVENTIONS:  - Assess urinary function  - Encourage oral fluids to ensure adequate hydration if ordered  - Administer IV fluids as ordered to ensure adequate hydration  - Administer ordered medications as needed  - Offer frequent toileting  - Follow urinary retention  protocol if ordered  Outcome: Progressing  Goal: Absence of urinary retention  Description: INTERVENTIONS:  - Assess patient’s ability to void and empty bladder  - Monitor I/O  - Bladder scan as needed  - Discuss with physician/AP medications to alleviate retention as needed  - Discuss catheterization for long term situations as appropriate  Outcome: Progressing  Goal: Urinary catheter remains patent  Description: INTERVENTIONS:  - Assess patency of urinary catheter  - If patient has a chronic littlejohn, consider changing catheter if non-functioning  - Follow guidelines for intermittent irrigation of non-functioning urinary catheter  Outcome: Progressing     Problem: SKIN/TISSUE INTEGRITY - ADULT  Goal: Skin Integrity remains intact(Skin Breakdown Prevention)  Description: Assess:  -Perform Henry assessment every   -Clean and moisturize skin every   -Inspect skin when repositioning, toileting, and assisting with ADLS  -Assess under medical devices such as  every   -Assess extremities for adequate circulation and sensation     Bed Management:  -Have minimal linens on bed & keep smooth, unwrinkled  -Change linens as needed when moist or perspiring  -Avoid sitting or lying in one position for more than  hours while in bed  -Keep HOB at degrees     Toileting:  -Offer bedside commode  -Assess for incontinence every   -Use incontinent care products after each incontinent episode such as     Activity:  -Mobilize patient  times a day  -Encourage activity and walks on unit  -Encourage or provide ROM exercises   -Turn and reposition patient every  Hours  -Use appropriate equipment to lift or move patient in bed  -Instruct/ Assist with weight shifting every  when out of bed in chair  -Consider limitation of chair time  hour intervals    Skin Care:  -Avoid use of baby powder, tape, friction and shearing, hot water or constrictive clothing  -Relieve pressure over bony prominences using   -Do not massage red bony areas    Next  Steps:  -Teach patient strategies to minimize risks such as    -Consider consults to  interdisciplinary teams such as   Outcome: Progressing  Goal: Incision(s), wounds(s) or drain site(s) healing without S/S of infection  Description: INTERVENTIONS  - Assess and document dressing, incision, wound bed, drain sites and surrounding tissue  - Provide patient and family education  - Perform skin care/dressing changes every   Outcome: Progressing  Goal: Pressure injury heals and does not worsen  Description: Interventions:  - Implement low air loss mattress or specialty surface (Criteria met)  - Apply silicone foam dressing  - Instruct/assist with weight shifting every  minutes when in chair   - Limit chair time to  hour intervals  - Use special pressure reducing interventions such as  when in chair   - Apply fecal or urinary incontinence containment device   - Perform passive or active ROM every   - Turn and reposition patient & offload bony prominences every  hours   - Utilize friction reducing device or surface for transfers   - Consider consults to  interdisciplinary teams such as   - Use incontinent care products after each incontinent episode such as  - Consider nutrition services referral as needed  Outcome: Progressing

## 2024-12-15 VITALS
TEMPERATURE: 98.6 F | WEIGHT: 131.84 LBS | OXYGEN SATURATION: 98 % | HEIGHT: 67 IN | SYSTOLIC BLOOD PRESSURE: 124 MMHG | RESPIRATION RATE: 19 BRPM | HEART RATE: 98 BPM | DIASTOLIC BLOOD PRESSURE: 86 MMHG | BODY MASS INDEX: 20.69 KG/M2

## 2024-12-15 LAB
ANION GAP SERPL CALCULATED.3IONS-SCNC: 7 MMOL/L (ref 4–13)
ANION GAP SERPL CALCULATED.3IONS-SCNC: 8 MMOL/L (ref 4–13)
ANION GAP SERPL CALCULATED.3IONS-SCNC: 8 MMOL/L (ref 4–13)
ATRIAL RATE: 108 BPM
ATRIAL RATE: 116 BPM
BACTERIA BLD CULT: NORMAL
BACTERIA BLD CULT: NORMAL
BUN SERPL-MCNC: 16 MG/DL (ref 5–25)
BUN SERPL-MCNC: 17 MG/DL (ref 5–25)
BUN SERPL-MCNC: 17 MG/DL (ref 5–25)
CALCIUM SERPL-MCNC: 8.1 MG/DL (ref 8.4–10.2)
CALCIUM SERPL-MCNC: 8.2 MG/DL (ref 8.4–10.2)
CALCIUM SERPL-MCNC: 8.2 MG/DL (ref 8.4–10.2)
CHLORIDE SERPL-SCNC: 102 MMOL/L (ref 96–108)
CHLORIDE SERPL-SCNC: 97 MMOL/L (ref 96–108)
CHLORIDE SERPL-SCNC: 97 MMOL/L (ref 96–108)
CO2 SERPL-SCNC: 22 MMOL/L (ref 21–32)
CO2 SERPL-SCNC: 22 MMOL/L (ref 21–32)
CO2 SERPL-SCNC: 24 MMOL/L (ref 21–32)
CREAT SERPL-MCNC: 1.1 MG/DL (ref 0.6–1.3)
CREAT SERPL-MCNC: 1.11 MG/DL (ref 0.6–1.3)
CREAT SERPL-MCNC: 1.12 MG/DL (ref 0.6–1.3)
ERYTHROCYTE [DISTWIDTH] IN BLOOD BY AUTOMATED COUNT: 12.8 % (ref 11.6–15.1)
GFR SERPL CREATININE-BSD FRML MDRD: 73 ML/MIN/1.73SQ M
GFR SERPL CREATININE-BSD FRML MDRD: 74 ML/MIN/1.73SQ M
GFR SERPL CREATININE-BSD FRML MDRD: 75 ML/MIN/1.73SQ M
GLUCOSE SERPL-MCNC: 111 MG/DL (ref 65–140)
GLUCOSE SERPL-MCNC: 112 MG/DL (ref 65–140)
GLUCOSE SERPL-MCNC: 126 MG/DL (ref 65–140)
HCT VFR BLD AUTO: 34.6 % (ref 36.5–49.3)
HGB BLD-MCNC: 12 G/DL (ref 12–17)
MAGNESIUM SERPL-MCNC: 2.4 MG/DL (ref 1.9–2.7)
MCH RBC QN AUTO: 29.3 PG (ref 26.8–34.3)
MCHC RBC AUTO-ENTMCNC: 34.7 G/DL (ref 31.4–37.4)
MCV RBC AUTO: 84 FL (ref 82–98)
P AXIS: -16 DEGREES
P AXIS: 73 DEGREES
PHOSPHATE SERPL-MCNC: 3.1 MG/DL (ref 2.7–4.5)
PLATELET # BLD AUTO: 281 THOUSANDS/UL (ref 149–390)
PMV BLD AUTO: 10 FL (ref 8.9–12.7)
POTASSIUM SERPL-SCNC: 3.8 MMOL/L (ref 3.5–5.3)
PR INTERVAL: 128 MS
PR INTERVAL: 134 MS
QRS AXIS: 0 DEGREES
QRS AXIS: 47 DEGREES
QRSD INTERVAL: 74 MS
QRSD INTERVAL: 74 MS
QT INTERVAL: 306 MS
QT INTERVAL: 314 MS
QTC INTERVAL: 421 MS
QTC INTERVAL: 425 MS
RBC # BLD AUTO: 4.1 MILLION/UL (ref 3.88–5.62)
SODIUM SERPL-SCNC: 127 MMOL/L (ref 135–147)
SODIUM SERPL-SCNC: 127 MMOL/L (ref 135–147)
SODIUM SERPL-SCNC: 133 MMOL/L (ref 135–147)
T WAVE AXIS: -24 DEGREES
T WAVE AXIS: 78 DEGREES
VENTRICULAR RATE: 108 BPM
VENTRICULAR RATE: 116 BPM
WBC # BLD AUTO: 9.73 THOUSAND/UL (ref 4.31–10.16)

## 2024-12-15 PROCEDURE — 80048 BASIC METABOLIC PNL TOTAL CA: CPT | Performed by: PHYSICIAN ASSISTANT

## 2024-12-15 PROCEDURE — 85027 COMPLETE CBC AUTOMATED: CPT | Performed by: INTERNAL MEDICINE

## 2024-12-15 PROCEDURE — 84100 ASSAY OF PHOSPHORUS: CPT | Performed by: INTERNAL MEDICINE

## 2024-12-15 PROCEDURE — 99024 POSTOP FOLLOW-UP VISIT: CPT | Performed by: NURSE PRACTITIONER

## 2024-12-15 PROCEDURE — 99232 SBSQ HOSP IP/OBS MODERATE 35: CPT | Performed by: INTERNAL MEDICINE

## 2024-12-15 PROCEDURE — 93010 ELECTROCARDIOGRAM REPORT: CPT | Performed by: INTERNAL MEDICINE

## 2024-12-15 PROCEDURE — 83735 ASSAY OF MAGNESIUM: CPT | Performed by: INTERNAL MEDICINE

## 2024-12-15 PROCEDURE — 80048 BASIC METABOLIC PNL TOTAL CA: CPT | Performed by: INTERNAL MEDICINE

## 2024-12-15 RX ORDER — SODIUM CHLORIDE, SODIUM GLUCONATE, SODIUM ACETATE, POTASSIUM CHLORIDE, MAGNESIUM CHLORIDE, SODIUM PHOSPHATE, DIBASIC, AND POTASSIUM PHOSPHATE .53; .5; .37; .037; .03; .012; .00082 G/100ML; G/100ML; G/100ML; G/100ML; G/100ML; G/100ML; G/100ML
50 INJECTION, SOLUTION INTRAVENOUS CONTINUOUS
Status: DISCONTINUED | OUTPATIENT
Start: 2024-12-15 | End: 2024-12-15

## 2024-12-15 RX ADMIN — AZTREONAM 1000 MG: 1 INJECTION, POWDER, LYOPHILIZED, FOR SOLUTION INTRAMUSCULAR; INTRAVENOUS at 19:44

## 2024-12-15 RX ADMIN — Medication 7.5 MG: at 09:45

## 2024-12-15 RX ADMIN — SODIUM CHLORIDE, SODIUM GLUCONATE, SODIUM ACETATE, POTASSIUM CHLORIDE, MAGNESIUM CHLORIDE, SODIUM PHOSPHATE, DIBASIC, AND POTASSIUM PHOSPHATE 50 ML/HR: .53; .5; .37; .037; .03; .012; .00082 INJECTION, SOLUTION INTRAVENOUS at 09:40

## 2024-12-15 NOTE — PROGRESS NOTES
Progress Note - Nephrology   Name: Roby Louis 54 y.o. male I MRN: [id number]  Unit/Bed#: S -01 I Date of Admission: 12/9/2024   Date of Service: 12/15/2024 I Hospital Day: 5    Assessment & Plan  Hyponatremia  -Patient does not have a known history of hyponatremia.  - Sodium on admission was 136 on 12/9  - Sodium level slowly decreasing to 124 on 12/12  - Patient received intravenous fluids until 12/11 in the evening when the patient refused further intravenous fluids  - Serum osmolarity 262  - Urine osmolarity-495  - Urine sodium 126  - Renal function appears to be stable and at baseline creatinine 1.1 mg/dL  - Potassium is appropriate  - Blood sugars are appropriate  -Renal function-patient briefly had a creatinine rise to 1.4 which has improved back to baseline.  Trial of fluids was given on 12/13 also with isotonic fluids due to tachycardia.    Overall patient likely has inappropriate/increased ADH in the setting of nausea, current illness.  Has ADH presents on urinalysis.  Patient did not tolerate salt tablets well.  Still with nausea.  1.8% saline was required overnight 12/12 into 12/13.  Sodium level is improving to 125.  And subsequently improved to 126 on 12/13 in the afternoon.  We gave a trial of isotonic fluids on 12/13 in the evening due to possible volume depletion and tachycardia.  Sodium level decreased to 125 on 12/14.  And we gave the patient tolvaptan as he is not amenable to salt tablets and isotonic fluids.  Sodium level improving to 127 on 12/15.  Continued low-dose intravenous fluids and tolvaptan.    Plan  - Give tolvaptan 1 more time today  - Check LFTs in a.m.  - Give intravenous fluids today as the patient still clinically appears to be volume depleted and large volume outputs and net negative balance  - Continue holding ACE inhibitor  - I/os; avoid nephrotoxic agents  - Continue holding ACE inhibitor  - Tachycardia-evaluation per primary hospitalist and primary teams.  There may  be component of volume depletion.  Does not have PE on CT scan.  Has sepsis.  Which is also likely contributing but sepsis is being source controlled now  - Monitor renal function closely as the patient received contrast 12/14  - BMP this evening with call parameters  - Free water intake can be liberalized as doing  - Patient has not yet stable for disposition from renal standpoint    BPH with obstruction/lower urinary tract symptoms  -Status post prostatectomy.  Further plans per primary team  Elevated PSA  -Per primary team  Urinary retention due to benign prostatic hyperplasia  -Status post prostatectomy.  Granda catheter management per primary team  Fever  -Antibiotics broadened to Azactam  HTN (hypertension)  -On lisinopril as outpatient  Tachycardia  -Likely in setting of fevers.  But is also having tachycardia when not febrile.  Will give intravenous fluids appears volume depleted.  No PE    I have reviewed the nephrology recommendations including tolvaptan and intravenous fluids, with primary team advanced practitioner and hospitalist team, and we are in agreement with renal plan including the information outlined above.     Subjective   Brief History of Admission - 54 y.o. male with a past medical history of BPH, hypertension, who was admitted to [facility] after presenting with for elective prostatectomy. A renal consultation is requested today for assistance in the management of hyponatremia. Patient initially presented December 9 for elective prostatectomy. Nephrology is consulted on 12/12 for hyponatremia evaluation. Patient seen at bedside and family present. I offered  iPad and patient stated he did not need Kiswahili interpretation and he was comfortable moving forward with our interview in English. Patient currently denies pain or discomfort. He states his main concern is the hiccups that started approximately 2 hours prior.. The course reviewed. Patient  postoperatively was having issues with nausea, poor appetite and was noted to have leukocytosis and fevers. Fever started approximately 12/11 early a.m.Was started on broad-spectrum antibiotics while infectious workup was being evaluated.     Patient denies complaints.  Is appearing to be clinically improving.  In good spirits today.  Denies nausea.    Objective :  Temp:  [99.3 °F (37.4 °C)-100.9 °F (38.3 °C)] 99.3 °F (37.4 °C)  HR:  [101-109] 101  BP: (113-186)/() 113/83  Resp:  [16-18] 16  SpO2:  [96 %-99 %] 96 %    Current Weight: Weight - Scale: 59.8 kg (131 lb 13.4 oz)  First Weight: Weight - Scale: 63.5 kg (140 lb)  I/O         12/13 0701  12/14 0700 12/14 0701  12/15 0700 12/15 0701  12/16 0700    P.O. 480      I.V. (mL/kg) 775 (13)      Total Intake(mL/kg) 1255 (21)      Urine (mL/kg/hr) 300 (0.2) 4000 (2.8)     Total Output 300 4000     Net +955 -4000                  Physical Exam  General: NAD  Skin: no rash  Eyes: anicteric sclera  ENT: moist mucous membrane  Neck: supple  Chest: CTA b/l, no ronchii, no wheeze, no rubs, no rales  CVS: s1s2, no murmur, no gallop, no rub  Abdomen: soft, nontender, nl sounds, surgical sites intact  Extremities: no edema LE b/l  : Positive littlejohn with dark yellow urine  Neuro: AAOX3  Psych: normal affect    Medications:    Current Facility-Administered Medications:     acetaminophen (TYLENOL) tablet 650 mg, 650 mg, Oral, Q6H PRN, TONY Godoy, 650 mg at 12/12/24 2246    ALPRAZolam (XANAX) tablet 0.5 mg, 0.5 mg, Oral, BID PRN, TONY Fox    aztreonam (AZACTAM) 1,000 mg in sodium chloride 0.9 % 50 mL IVPB, 1,000 mg, Intravenous, Q24H, Viry Loy, PA-C, Last Rate: 100 mL/hr at 12/14/24 1829, 1,000 mg at 12/14/24 1829    butalbital-acetaminophen-caffeine (FIORICET,ESGIC) -40 mg per tablet 1 tablet, 1 tablet, Oral, Q4H PRN, TONY Fox, 1 tablet at 12/13/24 2008    HYDROmorphone HCl (DILAUDID) injection 0.2 mg, 0.2 mg, Intravenous, Q2H  PRN, Angie Rodriguez PA-C    metoclopramide (REGLAN) injection 10 mg, 10 mg, Intravenous, Q6H PRN, TONY Fox, 10 mg at 12/14/24 1830    metoprolol (LOPRESSOR) injection 5 mg, 5 mg, Intravenous, Q6H PRN, Shania Villa FranklylaTONY, 5 mg at 12/14/24 2247    multi-electrolyte (PLASMALYTE-A/ISOLYTE-S PH 7.4) IV solution, 50 mL/hr, Intravenous, Continuous, Mary Beth Connell MD, Last Rate: 50 mL/hr at 12/15/24 0940, 50 mL/hr at 12/15/24 0940    naloxone (NARCAN) 0.04 mg/mL syringe 0.04 mg, 0.04 mg, Intravenous, Q1MIN PRN, Angie Rodriguez PA-C    ondansetron (ZOFRAN-ODT) dispersible tablet 4 mg, 4 mg, Oral, Q6H PRN, Angie Rodriguez PA-C, 4 mg at 12/14/24 0427    oxyCODONE (ROXICODONE) split tablet 2.5 mg, 2.5 mg, Oral, Q4H PRN **OR** oxyCODONE (ROXICODONE) IR tablet 5 mg, 5 mg, Oral, Q4H PRN, Angie Rodriguez PA-C, 5 mg at 12/10/24 0404      Lab Results: I have reviewed the following results:  Results from last 7 days   Lab Units 12/15/24  0459 12/14/24  2111 12/14/24  0752 12/13/24  2229 12/13/24  1510 12/13/24  0934 12/13/24  0232 12/12/24  1159 12/12/24  0317 12/11/24  0338 12/10/24  0403 12/09/24  1600   WBC Thousand/uL 9.73  --  11.49*  --   --   --  15.20*  --  14.90* 14.08* 15.78* 7.15   HEMOGLOBIN g/dL 12.0  --  11.3*  --   --   --  12.7  --  11.6* 10.4* 11.8* 11.9*   HEMATOCRIT % 34.6*  --  31.9*  --   --   --  36.9  --  34.1* 30.5* 35.1* 36.3*   PLATELETS Thousands/uL 281  --  237  --   --   --  233  --  237 221 258 252   POTASSIUM mmol/L 3.8  3.8 4.0 4.1 4.0 4.1 4.0 5.3   < > 3.8 3.3* 3.8 4.3   CHLORIDE mmol/L 97  97 96 97 97 98 95* 93*   < > 97 103 101 105   CO2 mmol/L 22  22 22 20* 22 20* 23 25   < > 22 26 29 26   BUN mg/dL 17  17 18 21 22 22 20 17   < > 10 12 17 19   CREATININE mg/dL 1.10  1.12 1.31* 0.97 1.25 1.17 1.27 1.43*   < > 0.96 0.92 1.05 1.14   CALCIUM mg/dL 8.2*  8.1* 8.2* 8.1* 8.3* 8.1* 8.4 8.5   < > 8.1* 8.1* 8.6 8.4   MAGNESIUM mg/dL 2.4  --  2.1  --   --   --   --    "--   --   --   --   --    PHOSPHORUS mg/dL 3.1  --  2.7  --   --   --   --   --   --   --   --   --    ALBUMIN g/dL  --   --   --   --   --   --   --   --   --  3.4*  --   --     < > = values in this interval not displayed.       Administrative Statements     Portions of the record may have been created with voice recognition software. Occasional wrong word or \"sound a like\" substitutions may have occurred due to the inherent limitations of voice recognition software. Read the chart carefully and recognize, using context, where substitutions have occurred.If you have any questions, please contact the dictating provider.  "

## 2024-12-15 NOTE — PLAN OF CARE
Problem: PAIN - ADULT  Goal: Verbalizes/displays adequate comfort level or baseline comfort level  Description: Interventions:  - Encourage patient to monitor pain and request assistance  - Assess pain using appropriate pain scale  - Administer analgesics based on type and severity of pain and evaluate response  - Implement non-pharmacological measures as appropriate and evaluate response  - Consider cultural and social influences on pain and pain management  - Notify physician/advanced practitioner if interventions unsuccessful or patient reports new pain  Outcome: Progressing     Problem: INFECTION - ADULT  Goal: Absence or prevention of progression during hospitalization  Description: INTERVENTIONS:  - Assess and monitor for signs and symptoms of infection  - Monitor lab/diagnostic results  - Monitor all insertion sites, i.e. indwelling lines, tubes, and drains  - Monitor endotracheal if appropriate and nasal secretions for changes in amount and color  - Jackson appropriate cooling/warming therapies per order  - Administer medications as ordered  - Instruct and encourage patient and family to use good hand hygiene technique  - Identify and instruct in appropriate isolation precautions for identified infection/condition  Outcome: Progressing  Goal: Absence of fever/infection during neutropenic period  Description: INTERVENTIONS:  - Monitor WBC    Outcome: Progressing     Problem: SAFETY ADULT  Goal: Patient will remain free of falls  Description: INTERVENTIONS:  - Educate patient/family on patient safety including physical limitations  - Instruct patient to call for assistance with activity   - Consult OT/PT to assist with strengthening/mobility   - Keep Call bell within reach  - Keep bed low and locked with side rails adjusted as appropriate  - Keep care items and personal belongings within reach  - Initiate and maintain comfort rounds  - Make Fall Risk Sign visible to staff  - Offer Toileting every  Hours,  in advance of need  - Initiate/Maintain alarm  - Obtain necessary fall risk management equipment:   - Apply yellow socks and bracelet for high fall risk patients  - Consider moving patient to room near nurses station  Outcome: Progressing  Goal: Maintain or return to baseline ADL function  Description: INTERVENTIONS:  -  Assess patient's ability to carry out ADLs; assess patient's baseline for ADL function and identify physical deficits which impact ability to perform ADLs (bathing, care of mouth/teeth, toileting, grooming, dressing, etc.)  - Assess/evaluate cause of self-care deficits   - Assess range of motion  - Assess patient's mobility; develop plan if impaired  - Assess patient's need for assistive devices and provide as appropriate  - Encourage maximum independence but intervene and supervise when necessary  - Involve family in performance of ADLs  - Assess for home care needs following discharge   - Consider OT consult to assist with ADL evaluation and planning for discharge  - Provide patient education as appropriate  Outcome: Progressing  Goal: Maintains/Returns to pre admission functional level  Description: INTERVENTIONS:  - Perform AM-PAC 6 Click Basic Mobility/ Daily Activity assessment daily.  - Set and communicate daily mobility goal to care team and patient/family/caregiver.   - Collaborate with rehabilitation services on mobility goals if consulted  - Perform Range of Motion  times a day.  - Reposition patient every  hours.  - Dangle patient  times a day  - Stand patient  times a day  - Ambulate patient  times a day  - Out of bed to chair  times a day   - Out of bed for meals  times a day  - Out of bed for toileting  - Record patient progress and toleration of activity level   Outcome: Progressing     Problem: DISCHARGE PLANNING  Goal: Discharge to home or other facility with appropriate resources  Description: INTERVENTIONS:  - Identify barriers to discharge w/patient and caregiver  - Arrange for  needed discharge resources and transportation as appropriate  - Identify discharge learning needs (meds, wound care, etc.)  - Arrange for interpretive services to assist at discharge as needed  - Refer to Case Management Department for coordinating discharge planning if the patient needs post-hospital services based on physician/advanced practitioner order or complex needs related to functional status, cognitive ability, or social support system  Outcome: Progressing     Problem: Knowledge Deficit  Goal: Patient/family/caregiver demonstrates understanding of disease process, treatment plan, medications, and discharge instructions  Description: Complete learning assessment and assess knowledge base.  Interventions:  - Provide teaching at level of understanding  - Provide teaching via preferred learning methods  Outcome: Progressing     Problem: Prexisting or High Potential for Compromised Skin Integrity  Goal: Skin integrity is maintained or improved  Description: INTERVENTIONS:  - Identify patients at risk for skin breakdown  - Assess and monitor skin integrity  - Assess and monitor nutrition and hydration status  - Monitor labs   - Assess for incontinence   - Turn and reposition patient  - Assist with mobility/ambulation  - Relieve pressure over bony prominences  - Avoid friction and shearing  - Provide appropriate hygiene as needed including keeping skin clean and dry  - Evaluate need for skin moisturizer/barrier cream  - Collaborate with interdisciplinary team   - Patient/family teaching  - Consider wound care consult   Outcome: Progressing     Problem: GENITOURINARY - ADULT  Goal: Maintains or returns to baseline urinary function  Description: INTERVENTIONS:  - Assess urinary function  - Encourage oral fluids to ensure adequate hydration if ordered  - Administer IV fluids as ordered to ensure adequate hydration  - Administer ordered medications as needed  - Offer frequent toileting  - Follow urinary retention  protocol if ordered  Outcome: Progressing  Goal: Absence of urinary retention  Description: INTERVENTIONS:  - Assess patient’s ability to void and empty bladder  - Monitor I/O  - Bladder scan as needed  - Discuss with physician/AP medications to alleviate retention as needed  - Discuss catheterization for long term situations as appropriate  Outcome: Progressing  Goal: Urinary catheter remains patent  Description: INTERVENTIONS:  - Assess patency of urinary catheter  - If patient has a chronic littlejohn, consider changing catheter if non-functioning  - Follow guidelines for intermittent irrigation of non-functioning urinary catheter  Outcome: Progressing     Problem: SKIN/TISSUE INTEGRITY - ADULT  Goal: Skin Integrity remains intact(Skin Breakdown Prevention)  Description: Assess:  -Perform Henry assessment every   -Clean and moisturize skin every   -Inspect skin when repositioning, toileting, and assisting with ADLS  -Assess under medical devices such as  every   -Assess extremities for adequate circulation and sensation     Bed Management:  -Have minimal linens on bed & keep smooth, unwrinkled  -Change linens as needed when moist or perspiring  -Avoid sitting or lying in one position for more than  hours while in bed  -Keep HOB at degrees     Toileting:  -Offer bedside commode  -Assess for incontinence every   -Use incontinent care products after each incontinent episode such as     Activity:  -Mobilize patient  times a day  -Encourage activity and walks on unit  -Encourage or provide ROM exercises   -Turn and reposition patient every  Hours  -Use appropriate equipment to lift or move patient in bed  -Instruct/ Assist with weight shifting every  when out of bed in chair  -Consider limitation of chair time  hour intervals    Skin Care:  -Avoid use of baby powder, tape, friction and shearing, hot water or constrictive clothing  -Relieve pressure over bony prominences using   -Do not massage red bony areas    Next  Steps:  -Teach patient strategies to minimize risks such as    -Consider consults to  interdisciplinary teams such as   Outcome: Progressing     Problem: SKIN/TISSUE INTEGRITY - ADULT  Goal: Skin Integrity remains intact(Skin Breakdown Prevention)  Description: Assess:  -Perform Henry assessment every   -Clean and moisturize skin every   -Inspect skin when repositioning, toileting, and assisting with ADLS  -Assess under medical devices such as  every   -Assess extremities for adequate circulation and sensation     Bed Management:  -Have minimal linens on bed & keep smooth, unwrinkled  -Change linens as needed when moist or perspiring  -Avoid sitting or lying in one position for more than  hours while in bed  -Keep HOB at degrees     Toileting:  -Offer bedside commode  -Assess for incontinence every   -Use incontinent care products after each incontinent episode such as     Activity:  -Mobilize patient  times a day  -Encourage activity and walks on unit  -Encourage or provide ROM exercises   -Turn and reposition patient every  Hours  -Use appropriate equipment to lift or move patient in bed  -Instruct/ Assist with weight shifting every  when out of bed in chair  -Consider limitation of chair time  hour intervals    Skin Care:  -Avoid use of baby powder, tape, friction and shearing, hot water or constrictive clothing  -Relieve pressure over bony prominences using   -Do not massage red bony areas    Next Steps:  -Teach patient strategies to minimize risks such as    -Consider consults to  interdisciplinary teams such as   Outcome: Progressing  Goal: Incision(s), wounds(s) or drain site(s) healing without S/S of infection  Description: INTERVENTIONS  - Assess and document dressing, incision, wound bed, drain sites and surrounding tissue  - Provide patient and family education  - Perform skin care/dressing changes every   Outcome: Progressing  Goal: Pressure injury heals and does not worsen  Description:  Interventions:  - Implement low air loss mattress or specialty surface (Criteria met)  - Apply silicone foam dressing  - Instruct/assist with weight shifting every  minutes when in chair   - Limit chair time to  hour intervals  - Use special pressure reducing interventions such as  when in chair   - Apply fecal or urinary incontinence containment device   - Perform passive or active ROM every   - Turn and reposition patient & offload bony prominences every  hours   - Utilize friction reducing device or surface for transfers   - Consider consults to  interdisciplinary teams such as   - Use incontinent care products after each incontinent episode such as   - Consider nutrition services referral as needed  Outcome: Progressing     Problem: METABOLIC, FLUID AND ELECTROLYTES - ADULT  Goal: Electrolytes maintained within normal limits  Description: INTERVENTIONS:  - Monitor labs and assess patient for signs and symptoms of electrolyte imbalances  - Administer electrolyte replacement as ordered  - Monitor response to electrolyte replacements, including repeat lab results as appropriate  - Instruct patient on fluid and nutrition as appropriate  Outcome: Progressing  Goal: Fluid balance maintained  Description: INTERVENTIONS:  - Monitor labs   - Monitor I/O and WT  - Instruct patient on fluid and nutrition as appropriate  - Assess for signs & symptoms of volume excess or deficit  Outcome: Progressing     Problem: Potential for Falls  Goal: Patient will remain free of falls  Description: INTERVENTIONS:  - Educate patient/family on patient safety including physical limitations  - Instruct patient to call for assistance with activity   - Consult OT/PT to assist with strengthening/mobility   - Keep Call bell within reach  - Keep bed low and locked with side rails adjusted as appropriate  - Keep care items and personal belongings within reach  - Initiate and maintain comfort rounds  - Make Fall Risk Sign visible to staff  - Offer  Toileting every  Hours, in advance of need  - Initiate/Maintain alarm  - Obtain necessary fall risk management equipment:  - Apply yellow socks and bracelet for high fall risk patients  - Consider moving patient to room near nurses station  Outcome: Progressing

## 2024-12-15 NOTE — ASSESSMENT & PLAN NOTE
-Likely in setting of fevers.  But is also having tachycardia when not febrile.  Will give intravenous fluids appears volume depleted.  No PE

## 2024-12-15 NOTE — PROGRESS NOTES
Progress Note - Hospitalist   Name: Roby Louis 54 y.o. male I MRN: [id number]  Unit/Bed#: S -01 I Date of Admission: 12/9/2024   Date of Service: 12/15/2024 I Hospital Day: 5    Assessment & Plan  Tachycardia  Patient was tachycardic to the 1 teens, now low 100s.  Initially thought to have urinary tract infection.  Was changed to aztreonam on 12/11/2024, history of Pseudomonas in the urine in November  Patient getting aztreonam 1000 mg once daily and multi light for 6 hours  Agree with CT abdomen pelvis with bladder wall thickening  Granda catheter maintained  BPH with obstruction/lower urinary tract symptoms  Patient status post prostatectomy.  This was done on 12/12/2024.  Urology is the primary.  No hydronephrosis on bladder ultrasound.  Hyponatremia  Recent Labs     12/14/24  0752 12/14/24  2111 12/15/24  0459   SODIUM 125* 125* 127*  127*     Lab Results   Component Value Date    OSMOUA 495 12/12/2024    NAUR 126 12/12/2024    OSMOLALITSER 262 (L) 12/12/2024   Likely secondary to SIADH.  Nephrology is managing this 1.  Patient is set to receive tolvaptan today and trial of IV fluids.      Fever  Last fever on 12/12/2024.    Pseudomonas in the urine in mid November  Maintained on aztreonam per urology day 7  CT showed bladder wall thickening without pneumonia presents in lungs.  WBC normal    Elevated PSA  Managed by urology.  Urinary retention due to benign prostatic hyperplasia  Per urology  HTN (hypertension)  Maintained on lisinopril as an outpatient.  Creatinine is normalizing, however remains hyponatremic.  Continue to hold ACE inhibitor and may benefit from addition of amlodipine if persistent hypertension    VTE Pharmacologic Prophylaxis: VTE Score: 3  per prior team    Mobility:   Basic Mobility Inpatient Raw Score: 24  JH-HLM Goal: 8: Walk 250 feet or more  JH-HLM Achieved: 8: Walk 250 feet ot more  JH-HLM Goal achieved. Continue to encourage appropriate mobility.    Patient Centered Rounds: I  performed bedside rounds with nursing staff today.   Discussions with Specialists or Other Care Team Provider: Nephrology and urology notes reviewed yesterday    Education and Discussions with Family / Patient: Patient declined call to .     Current Length of Stay: 5 day(s)  Current Patient Status: Inpatient   Certification Statement:  Per primary team  Discharge Plan: [facility] is following this patient on consult. They are not yet medically stable for discharge secondary to currently on IV antibiotics and hyponatremia.    Code Status: Level 1 - Full Code    Subjective   Patient notes to me that he feels an 8 out of 10 and would like to go home.  We discussed his low sodium and elevated temperatures.  He denies any fevers or chills.  He denies shortness of breath or cough.  He notes he had diarrhea 2 days ago however this is gone.  He feels his pain is controlled.    Objective :  Temp:  [98.8 °F (37.1 °C)-100.9 °F (38.3 °C)] 99.3 °F (37.4 °C)  HR:  [101-116] 101  BP: (113-186)/() 113/83  Resp:  [16-18] 16  SpO2:  [96 %-99 %] 96 %    Body mass index is 20.65 kg/m².     Input and Output Summary (last 24 hours):     Intake/Output Summary (Last 24 hours) at 12/15/2024 0823  Last data filed at 12/14/2024 2254  Gross per 24 hour   Intake --   Output 4000 ml   Net -4000 ml       Physical Exam  Vitals and nursing note reviewed.   Constitutional:       Appearance: Normal appearance. He is not ill-appearing or diaphoretic.   HENT:      Head: Normocephalic and atraumatic.      Nose: Nose normal. No congestion.      Mouth/Throat:      Mouth: Mucous membranes are moist.      Pharynx: No oropharyngeal exudate.   Eyes:      General: No scleral icterus.     Conjunctiva/sclera: Conjunctivae normal.   Pulmonary:      Effort: Pulmonary effort is normal. No respiratory distress.   Abdominal:      General: Bowel sounds are normal. There is no distension.      Palpations: Abdomen is soft.      Tenderness: There is no  abdominal tenderness.   Genitourinary:     Comments: Granda draining clear yellow urine  Musculoskeletal:      Cervical back: Neck supple. No rigidity.   Skin:     General: Skin is warm.      Coloration: Skin is not jaundiced.   Neurological:      Mental Status: He is alert and oriented to person, place, and time.   Psychiatric:         Mood and Affect: Mood normal.         Behavior: Behavior normal.           Lines/Drains:  Lines/Drains/Airways       Active Status       Name Placement date Placement time Site Days    Urethral Catheter Three way;Non-latex;Other (Comment) 24 Fr. 12/09/24  1255  Three way;Non-latex;Other (Comment)  5                  Urinary Catheter:  Goal for removal:  Per primary team                 Lab Results: I have reviewed the following results:   Results from last 7 days   Lab Units 12/15/24  0459 12/12/24 0317 12/11/24  0338   WBC Thousand/uL 9.73   < > 14.08*   HEMOGLOBIN g/dL 12.0   < > 10.4*   HEMATOCRIT % 34.6*   < > 30.5*   PLATELETS Thousands/uL 281   < > 221   SEGS PCT %  --   --  75   LYMPHO PCT %  --   --  17   MONO PCT %  --   --  7   EOS PCT %  --   --  0    < > = values in this interval not displayed.     Results from last 7 days   Lab Units 12/15/24  0459 12/12/24 0317 12/11/24  0338   SODIUM mmol/L 127*  127*   < > 133*   POTASSIUM mmol/L 3.8  3.8   < > 3.3*   CHLORIDE mmol/L 97  97   < > 103   CO2 mmol/L 22  22   < > 26   BUN mg/dL 17  17   < > 12   CREATININE mg/dL 1.10  1.12   < > 0.92   ANION GAP mmol/L 8  8   < > 4   CALCIUM mg/dL 8.2*  8.1*   < > 8.1*   ALBUMIN g/dL  --   --  3.4*   TOTAL BILIRUBIN mg/dL  --   --  0.56   ALK PHOS U/L  --   --  49   ALT U/L  --   --  17   AST U/L  --   --  23   GLUCOSE RANDOM mg/dL 112  111   < > 123    < > = values in this interval not displayed.                       Recent Cultures (last 7 days):   Results from last 7 days   Lab Units 12/11/24  0344   BLOOD CULTURE  No Growth at 72 hrs.  No Growth at 72 hrs.             Last  24 Hours Medication List:     Current Facility-Administered Medications:     acetaminophen (TYLENOL) tablet 650 mg, Q6H PRN    ALPRAZolam (XANAX) tablet 0.5 mg, BID PRN    aztreonam (AZACTAM) 1,000 mg in sodium chloride 0.9 % 50 mL IVPB, Q24H, Last Rate: 1,000 mg (12/14/24 1829)    butalbital-acetaminophen-caffeine (FIORICET,ESGIC) -40 mg per tablet 1 tablet, Q4H PRN    HYDROmorphone HCl (DILAUDID) injection 0.2 mg, Q2H PRN    metoclopramide (REGLAN) injection 10 mg, Q6H PRN    metoprolol (LOPRESSOR) injection 5 mg, Q6H PRN    multi-electrolyte (PLASMALYTE-A/ISOLYTE-S PH 7.4) IV solution, Continuous    naloxone (NARCAN) 0.04 mg/mL syringe 0.04 mg, Q1MIN PRN    ondansetron (ZOFRAN-ODT) dispersible tablet 4 mg, Q6H PRN    oxyCODONE (ROXICODONE) split tablet 2.5 mg, Q4H PRN **OR** oxyCODONE (ROXICODONE) IR tablet 5 mg, Q4H PRN    tolvaptan (Samsca) split tablet 7.5 mg, Once    Administrative Statements   Today, Patient Was Seen By: Thai Looney MD      **Please Note: This note may have been constructed using a voice recognition system.**

## 2024-12-15 NOTE — ASSESSMENT & PLAN NOTE
Last fever on 12/12/2024.    Pseudomonas in the urine in mid November  Maintained on aztreonam per urology day 7  CT showed bladder wall thickening without pneumonia presents in lungs.  WBC normal

## 2024-12-15 NOTE — ASSESSMENT & PLAN NOTE
-Patient does not have a known history of hyponatremia.  - Sodium on admission was 136 on 12/9  - Sodium level slowly decreasing to 124 on 12/12  - Patient received intravenous fluids until 12/11 in the evening when the patient refused further intravenous fluids  - Serum osmolarity 262  - Urine osmolarity-495  - Urine sodium 126  - Renal function appears to be stable and at baseline creatinine 1.1 mg/dL  - Potassium is appropriate  - Blood sugars are appropriate  -Renal function-patient briefly had a creatinine rise to 1.4 which has improved back to baseline.  Trial of fluids was given on 12/13 also with isotonic fluids due to tachycardia.    Overall patient likely has inappropriate/increased ADH in the setting of nausea, current illness.  Has ADH presents on urinalysis.  Patient did not tolerate salt tablets well.  Still with nausea.  1.8% saline was required overnight 12/12 into 12/13.  Sodium level is improving to 125.  And subsequently improved to 126 on 12/13 in the afternoon.  We gave a trial of isotonic fluids on 12/13 in the evening due to possible volume depletion and tachycardia.  Sodium level decreased to 125 on 12/14.  And we gave the patient tolvaptan as he is not amenable to salt tablets and isotonic fluids.  Sodium level improving to 127 on 12/15.  Continued low-dose intravenous fluids and tolvaptan.    Plan  - Give tolvaptan 1 more time today  - Check LFTs in a.m.  - Give intravenous fluids today as the patient still clinically appears to be volume depleted and large volume outputs and net negative balance  - Continue holding ACE inhibitor  - I/os; avoid nephrotoxic agents  - Continue holding ACE inhibitor  - Tachycardia-evaluation per primary hospitalist and primary teams.  There may be component of volume depletion.  Does not have PE on CT scan.  Has sepsis.  Which is also likely contributing but sepsis is being source controlled now  - Monitor renal function closely as the patient received  contrast 12/14  - [facility] this evening with call parameters  - Free water intake can be liberalized as doing  - Patient has not yet stable for disposition from renal standpoint

## 2024-12-15 NOTE — ASSESSMENT & PLAN NOTE
"S/p robotic assisted laparoscopic suprapubic prostatectomy by Dr. Herzog 12/9  Febrile--likely due to  source given months of catheter dependency.  Tmax 100.9 overnight without leukocytosis.  Chest x-ray negative for atelectasis or pneumonia.  Renal ultrasound negative for hydronephrosis  Patient states that he no longer has headache, nausea and feels much better desiring \"to go home close \"  CT PE protocol and abdomen and pelvis negative for embolus,  or GI tract obstruction, fluid collection, nephroureterolithiasis, bleeding, or urine leak.  Granda in situ--4L 24 hour urinary output    Plan:  Incentive spirometry  Ambulation  Hydration  Antibiosis  Trend labs  Appreciate nephrologic and internal medicine input.  Maintain Granda catheter to straight drainage for postoperative requirement.  Discharge when appropriate from the medical standpoint if afebrile this PM..  "

## 2024-12-15 NOTE — PLAN OF CARE
Problem: PAIN - ADULT  Goal: Verbalizes/displays adequate comfort level or baseline comfort level  Description: Interventions:  - Encourage patient to monitor pain and request assistance  - Assess pain using appropriate pain scale  - Administer analgesics based on type and severity of pain and evaluate response  - Implement non-pharmacological measures as appropriate and evaluate response  - Consider cultural and social influences on pain and pain management  - Notify physician/advanced practitioner if interventions unsuccessful or patient reports new pain  12/14/2024 2328 by Zbe Barnes RN  Outcome: Progressing  12/14/2024 2322 by Zeb Barnes RN  Outcome: Progressing     Problem: INFECTION - ADULT  Goal: Absence or prevention of progression during hospitalization  Description: INTERVENTIONS:  - Assess and monitor for signs and symptoms of infection  - Monitor lab/diagnostic results  - Monitor all insertion sites, i.e. indwelling lines, tubes, and drains  - Monitor endotracheal if appropriate and nasal secretions for changes in amount and color  - [place] appropriate cooling/warming therapies per order  - Administer medications as ordered  - Instruct and encourage patient and family to use good hand hygiene technique  - Identify and instruct in appropriate isolation precautions for identified infection/condition  12/14/2024 2328 by Zeb Barnes RN  Outcome: Progressing  12/14/2024 2322 by Zeb Barnes RN  Outcome: Progressing     Problem: GENITOURINARY - ADULT  Goal: Maintains or returns to baseline urinary function  Description: INTERVENTIONS:  - Assess urinary function  - Encourage oral fluids to ensure adequate hydration if ordered  - Administer IV fluids as ordered to ensure adequate hydration  - Administer ordered medications as needed  - Offer frequent toileting  - Follow urinary retention protocol if ordered  12/14/2024 2328 by Zeb Barnes RN  Outcome: Progressing  12/14/2024 2322 by Zeb  TREE Barnes  Outcome: Progressing     Problem: GENITOURINARY - ADULT  Goal: Absence of urinary retention  Description: INTERVENTIONS:  - Assess patient’s ability to void and empty bladder  - Monitor I/O  - Bladder scan as needed  - Discuss with physician/AP medications to alleviate retention as needed  - Discuss catheterization for long term situations as appropriate  12/14/2024 2328 by Zeb Barnes RN  Outcome: Progressing  12/14/2024 2322 by Zeb Barnes RN  Outcome: Progressing     Problem: GENITOURINARY - ADULT  Goal: Urinary catheter remains patent  Description: INTERVENTIONS:  - Assess patency of urinary catheter  - If patient has a chronic littlejohn, consider changing catheter if non-functioning  - Follow guidelines for intermittent irrigation of non-functioning urinary catheter  12/14/2024 2328 by Zeb Barnes RN  Outcome: Progressing  12/14/2024 2322 by Zeb Barnes RN  Outcome: Progressing     Problem: SKIN/TISSUE INTEGRITY - ADULT  Goal: Incision(s), wounds(s) or drain site(s) healing without S/S of infection  Description: INTERVENTIONS  - Assess and document dressing, incision, wound bed, drain sites and surrounding tissue  - Provide patient and family education  - Perform skin care/dressing changes every shift and as needed.  12/14/2024 2328 by Zeb Barnes RN  Outcome: Progressing  12/14/2024 2322 by Zeb Barnes RN  Outcome: Progressing     Problem: METABOLIC, FLUID AND ELECTROLYTES - ADULT  Goal: Electrolytes maintained within normal limits  Description: INTERVENTIONS:  - Monitor labs and assess patient for signs and symptoms of electrolyte imbalances  - Administer electrolyte replacement as ordered  - Monitor response to electrolyte replacements, including repeat lab results as appropriate  - Instruct patient on fluid and nutrition as appropriate  Outcome: Progressing     Problem: METABOLIC, FLUID AND ELECTROLYTES - ADULT  Goal: Fluid balance maintained  Description: INTERVENTIONS:  - Monitor  labs   - Monitor I/O and WT  - Instruct patient on fluid and nutrition as appropriate  - Assess for signs & symptoms of volume excess or deficit  Outcome: Progressing

## 2024-12-15 NOTE — ASSESSMENT & PLAN NOTE
Patient was tachycardic to the 1 teens, now low 100s.  Initially thought to have urinary tract infection.  Was changed to aztreonam on 12/11/2024, history of Pseudomonas in the urine in November  Patient getting aztreonam 1000 mg once daily and multi light for 6 hours  Agree with CT abdomen pelvis with bladder wall thickening  Granda catheter maintained

## 2024-12-15 NOTE — ASSESSMENT & PLAN NOTE
Recent Labs     12/14/24  0752 12/14/24  2111 12/15/24  0459   SODIUM 125* 125* 127*  127*     Lab Results   Component Value Date    OSMOUA 495 12/12/2024    NAUR 126 12/12/2024    OSMOLALITSER 262 (L) 12/12/2024   Likely secondary to SIADH.  Nephrology is managing this 1.  Patient is set to receive tolvaptan today and trial of IV fluids.

## 2024-12-15 NOTE — ASSESSMENT & PLAN NOTE
Maintained on lisinopril as an outpatient.  Creatinine is normalizing, however remains hyponatremic.  Continue to hold ACE inhibitor and may benefit from addition of amlodipine if persistent hypertension

## 2024-12-15 NOTE — PROGRESS NOTES
"Progress Note - Urology   Name: Roby Louis 54 y.o. male I MRN: [id number]  Unit/Bed#: S -01 I Date of Admission: 12/9/2024   Date of Service: 12/15/2024 I Hospital Day: 5    Assessment & Plan  BPH with obstruction/lower urinary tract symptoms  S/p robotic assisted laparoscopic suprapubic prostatectomy by Dr. Herzog 12/9  Febrile--likely due to  source given months of catheter dependency.  Tmax 100.9 overnight without leukocytosis.  Chest x-ray negative for atelectasis or pneumonia.  Renal ultrasound negative for hydronephrosis  Patient states that he no longer has headache, nausea and feels much better desiring \"to go home close \"  CT PE protocol and abdomen and pelvis negative for embolus,  or GI tract obstruction, fluid collection, nephroureterolithiasis, bleeding, or urine leak.  Granda in situ--4L 24 hour urinary output    Plan:  Incentive spirometry  Ambulation  Hydration  Antibiosis  Trend labs  Appreciate nephrologic and internal medicine input.  Maintain Granda catheter to straight drainage for postoperative requirement.  Discharge when appropriate from the medical standpoint if afebrile this PM..  Fever  As above.  Likely  source.  Continue Azactam for now.  Tachycardia  Sinus tachycardia on EKG  Consult internal medicine for evaluation.  Obtained CT PE protocol late this afternoon--negative for embolus  Elevated PSA  Surgical tissue sample sent to pathology--still pending.  Will review an outpatient visit.  Hyponatremia   Likely inappropriate/increased ADH   Did not tolerate prescribed salt tablets  Per nephrology--fluid management, tolvaptan x 1 dose hold ACE inhibitors  --Comanagement well-appreciated.      Urinary retention due to benign prostatic hyperplasia  As above    Urology service will follow.    Subjective   Roby Louis  Is a 54-year-old male with BPH, status post robotic simple prostatectomy, complicated by persistent nausea and postoperative fever.  Patient defervesced well on " Azactam.  Chest x-ray negative for acute cardiopulmonary source, ultrasound negative for hydronephrosis and CT today obtained for persistent tachycardia ruled out PE and/or other GI or  tract abnormality inconsistent with recent surgical procedure.         Objective :  Temp:  [99.3 °F (37.4 °C)-100.9 °F (38.3 °C)] 99.3 °F (37.4 °C)  HR:  [101-109] 101  BP: (113-186)/() 113/83  Resp:  [16-18] 16  SpO2:  [96 %-99 %] 96 %    I/O         12/13 0701  12/14 0700 12/14 0701  12/15 0700 12/15 0701  12/16 0700    P.O. 480      I.V. (mL/kg) 775 (13)      Total Intake(mL/kg) 1255 (21)      Urine (mL/kg/hr) 300 (0.2) 4000 (2.8)     Total Output 300 4000     Net +955 -4000                  Lines/Drains/Airways       Active Status       Name Placement date Placement time Site Days    Urethral Catheter Three way;Non-latex;Other (Comment) 24 Fr. 12/09/24  1255  Three way;Non-latex;Other (Comment)  5                  Physical Exam  General appearance: alert and oriented, in no acute distress, appears stated age, cooperative, and no distress  Head: Normocephalic, without obvious abnormality, atraumatic  Neck: no JVD and supple, symmetrical, trachea midline  Lungs: diminished breath sounds  Heart: regular rate and rhythm, S1, S2 normal, no murmur, click, rub or gallop  Abdomen: abnormal findings:  mild and puncture sites clean dry and intact. tenderness in the lower abdomen  Extremities: extremities normal, warm and well-perfused; no cyanosis, clubbing, or edema  Pulses: 2+ and symmetric  Neurologic: Grossly normal  Granda--yellow urine      Lab Results: I have reviewed the following results:  Recent Labs     12/15/24  0459   WBC 9.73   HGB 12.0   HCT 34.6*      SODIUM 127*  127*   K 3.8  3.8   CL 97  97   CO2 22  22   BUN 17  17   CREATININE 1.10  1.12   GLUC 112  111   MG 2.4   PHOS 3.1       Imaging Results Review: I reviewed radiology reports from this admission including: CT abdomen/pelvis.      CT pe study w  abdomen pelvis w contrast [[id number]] Collected: 12/14/24 1603   Order Status: Completed Updated: 12/14/24 1625   Narrative:     CT PULMONARY ANGIOGRAM OF THE CHEST AND CT ABDOMEN AND PELVIS WITH INTRAVENOUS CONTRAST    INDICATION: Persistent tachycardia post pelvic surgery.    COMPARISON: Ultrasound dated 12/13/2024    TECHNIQUE: CT examination of the chest, abdomen and pelvis was performed. Thin section CT angiographic technique was used in the chest in order to evaluate for pulmonary embolus and coronal 3D MIP postprocessing was performed on the acquisition scanner.  Multiplanar 2D reformatted images were created from the source data.    This examination, like all CT scans performed in the [facility], was performed utilizing techniques to minimize radiation dose exposure, including the use of iterative reconstruction and automated exposure control. Radiation dose length  product (DLP) for this visit: 654.33 mGy-cm    IV Contrast: 80 mL of iohexol (OMNIPAQUE)  Enteric Contrast: Not administered.    Motion degraded evaluation of the lower chest and upper abdomen.    FINDINGS:    CHEST    PULMONARY ARTERIAL TREE: The evaluation of the subsegmental branches in both lung bases is limited by motion artifact. No central, lobar, or segmental embolus seen.    LUNGS: Lungs are clear. No tracheal or endobronchial lesion.    PLEURA: Unremarkable.    HEART/AORTA: Heart is unremarkable for patient's age. No thoracic aortic aneurysm.    MEDIASTINUM AND WAYNE: Unremarkable.    CHEST WALL AND LOWER NECK: Unremarkable.    ABDOMEN    LIVER/BILIARY TREE: Unremarkable.    GALLBLADDER: No calcified gallstones. No pericholecystic inflammatory change.    SPLEEN: Unremarkable.    PANCREAS: Unremarkable.    ADRENAL GLANDS: Unremarkable.    KIDNEYS/URETERS: 1.8 cm exophytic right lower pole renal cyst is poorly evaluated due to motion but appears increased in size compared to 1.5 cm previously. There is no hydronephrosis or  definite solid renal mass. There is mild bilateral urothelial  enhancement.    STOMACH AND BOWEL: Unremarkable.    APPENDIX: No findings to suggest appendicitis.    ABDOMINOPELVIC CAVITY: Trace free air is likely postoperative. No lymphadenopathy.    VESSELS: Unremarkable for patient's age.    PELVIS    REPRODUCTIVE ORGANS: Interval prostatectomy. There is a fluid collection in the prostatectomy bed which is nonspecific. This measures 4.3 cm transverse by 3.2 cm AP by 1.1 cm craniocaudal.    URINARY BLADDER: Partially decompressed by Granda catheter with pronounced wall thickening. This has progressed in the interim.    ABDOMINAL WALL/INGUINAL REGIONS: Unremarkable.    BONES: No acute fracture or suspicious osseous lesion.   Impression:       1. Evaluation of the lung bases is limited due to motion artifact. There is no central, lobar, or segmental pulmonary embolus.  2. Evidence of prostatectomy. There is a thin fluid collection in the prostatectomy bed which is nonspecific. Superimposed infection is best excluded clinically. Consider attention on follow-up.  3. Circumferential bladder wall thickening and bilateral urothelial enhancement. Urinary tract infection is not excluded. Consider urinalysis to correlate for urinary tract infection.  4. Exophytic 1.8 cm right lower pole cyst is poorly characterized on this exam due to motion but appears to reflect a simple cyst on recent ultrasound.      Workstation performed: [id number]    kidney and bladder [[id number]] Collected: 12/13/24 1330   Order Status: Completed Updated: 12/13/24 1334   Narrative:     RENAL ULTRASOUND    INDICATION: elevation in Cr, eval for hydro.    COMPARISON: CT abdomen/pelvis 8/24/2023    TECHNIQUE: Ultrasound of the retroperitoneum was performed with a curvilinear transducer utilizing volumetric sweeps and still imaging techniques.    FINDINGS:    KIDNEYS:  Symmetric and normal size.  Right kidney: 8.0 x 4.6 x 4.0 cm. Volume 77.2 mL  Left  kidney: 9.9 x 6.2 x 4.0 cm. Volume 126.7 mL    Right kidney  Normal echogenicity and contour.  No mass is identified. Simple appearing lower pole cyst measuring 2.1 cm.  No hydronephrosis.  No shadowing calculi.  No perinephric fluid collections.    Left kidney  Normal echogenicity and contour.  No mass is identified.  No hydronephrosis.  No shadowing calculi.  No perinephric fluid collections.    URETERS:  Nonvisualized.    BLADDER:  A littlejohn catheter is in place, decompressing the bladder and limiting its evaluation.       Impression:       No hydronephrosis.    A littlejohn catheter is in place, decompressing the bladder and limiting its evaluation.        Workstation performed: [id number]   XR chest pa & lateral [[id number]]

## 2024-12-16 LAB
ALBUMIN SERPL BCG-MCNC: 3.3 G/DL (ref 3.5–5)
ALP SERPL-CCNC: 61 U/L (ref 34–104)
ALT SERPL W P-5'-P-CCNC: 40 U/L (ref 7–52)
ANION GAP SERPL CALCULATED.3IONS-SCNC: 7 MMOL/L (ref 4–13)
AST SERPL W P-5'-P-CCNC: 29 U/L (ref 13–39)
BACTERIA BLD CULT: NORMAL
BACTERIA BLD CULT: NORMAL
BILIRUB DIRECT SERPL-MCNC: 0.01 MG/DL (ref 0–0.2)
BILIRUB SERPL-MCNC: 0.26 MG/DL (ref 0.2–1)
BUN SERPL-MCNC: 14 MG/DL (ref 5–25)
CALCIUM SERPL-MCNC: 8.2 MG/DL (ref 8.4–10.2)
CHLORIDE SERPL-SCNC: 106 MMOL/L (ref 96–108)
CO2 SERPL-SCNC: 25 MMOL/L (ref 21–32)
CREAT SERPL-MCNC: 0.98 MG/DL (ref 0.6–1.3)
ERYTHROCYTE [DISTWIDTH] IN BLOOD BY AUTOMATED COUNT: 13.1 % (ref 11.6–15.1)
GFR SERPL CREATININE-BSD FRML MDRD: 86 ML/MIN/1.73SQ M
GLUCOSE SERPL-MCNC: 112 MG/DL (ref 65–140)
HCT VFR BLD AUTO: 34.4 % (ref 36.5–49.3)
HGB BLD-MCNC: 11.8 G/DL (ref 12–17)
MAGNESIUM SERPL-MCNC: 2.6 MG/DL (ref 1.9–2.7)
MCH RBC QN AUTO: 29.2 PG (ref 26.8–34.3)
MCHC RBC AUTO-ENTMCNC: 34.3 G/DL (ref 31.4–37.4)
MCV RBC AUTO: 85 FL (ref 82–98)
PHOSPHATE SERPL-MCNC: 3 MG/DL (ref 2.7–4.5)
PLATELET # BLD AUTO: 317 THOUSANDS/UL (ref 149–390)
PMV BLD AUTO: 9.6 FL (ref 8.9–12.7)
POTASSIUM SERPL-SCNC: 4.2 MMOL/L (ref 3.5–5.3)
PROT SERPL-MCNC: 7.1 G/DL (ref 6.4–8.4)
RBC # BLD AUTO: 4.04 MILLION/UL (ref 3.88–5.62)
SODIUM SERPL-SCNC: 138 MMOL/L (ref 135–147)
WBC # BLD AUTO: 6.64 THOUSAND/UL (ref 4.31–10.16)

## 2024-12-16 PROCEDURE — 85027 COMPLETE CBC AUTOMATED: CPT | Performed by: INTERNAL MEDICINE

## 2024-12-16 PROCEDURE — 99232 SBSQ HOSP IP/OBS MODERATE 35: CPT | Performed by: STUDENT IN AN ORGANIZED HEALTH CARE EDUCATION/TRAINING PROGRAM

## 2024-12-16 PROCEDURE — 99024 POSTOP FOLLOW-UP VISIT: CPT

## 2024-12-16 PROCEDURE — NC001 PR NO CHARGE

## 2024-12-16 PROCEDURE — 80048 BASIC METABOLIC PNL TOTAL CA: CPT | Performed by: INTERNAL MEDICINE

## 2024-12-16 PROCEDURE — 84100 ASSAY OF PHOSPHORUS: CPT | Performed by: INTERNAL MEDICINE

## 2024-12-16 PROCEDURE — 83735 ASSAY OF MAGNESIUM: CPT | Performed by: INTERNAL MEDICINE

## 2024-12-16 PROCEDURE — 80076 HEPATIC FUNCTION PANEL: CPT | Performed by: INTERNAL MEDICINE

## 2024-12-16 RX ORDER — NITROFURANTOIN 25; 75 MG/1; MG/1
100 CAPSULE ORAL 2 TIMES DAILY
Qty: 14 CAPSULE | Refills: 0 | Status: SHIPPED | OUTPATIENT
Start: 2024-12-16 | End: 2024-12-23

## 2024-12-16 RX ORDER — CIPROFLOXACIN 500 MG/1
500 TABLET, FILM COATED ORAL EVERY 12 HOURS SCHEDULED
Qty: 14 TABLET | Refills: 0 | Status: SHIPPED | OUTPATIENT
Start: 2024-12-16 | End: 2024-12-23

## 2024-12-16 NOTE — PROGRESS NOTES
Progress Note - Nephrology   Name: Roby Louis 54 y.o. male I MRN: [id number]  Unit/Bed#: S -01 I Date of Admission: 12/9/2024   Date of Service: 12/16/2024 I Hospital Day: 6     Assessment & Plan  Hyponatremia    Sodium on admission:   Serum osm: 262  Urine osm: 495  Serum sodium 126  Etiology: Likely secondary to SIADH   Current sodium 138 mg/L, resolved after tolvaptan   Recommend relative fluid restriction 1.8 L to 2 L alternating drinks   No indication of sodium chloride tablets at this time  Monitor BMP   Sodium goal > 133  Follow-up with PCP or nephrology on discharge\      BPH with obstruction/lower urinary tract symptoms  Status post prostatectomy   Management as per urology   HTN (hypertension)    Volume: Euvolemic on exam  Blood pressure: Hypertensive, /101  Recommend:  Goal less than 140/90  Can resume ACE inhibitors  Agree with amlodipine if no  BP control      I have reviewed the nephrology recommendations including continue with fluid restriction 1.8 to 2.8 L, with primary team, and we are in agreement with renal plan including the information outlined above. Nephrology service signing off.    Subjective   Brief History of Admission - 55 yo man with PMH of BPH, hypertension, status post prostatectomy with urinary retention.  Nephrology is consulted for management of hyponatremia    Feels well, no shortness of breath, no chest pain, eating and drinking well    Objective :  Temp:  [97.3 °F (36.3 °C)-99.7 °F (37.6 °C)] 99.7 °F (37.6 °C)  HR:  [91-98] 95  BP: (124-147)/() 141/101  Resp:  [14-19] 14  SpO2:  [97 %-99 %] 97 %  O2 Device: None (Room air)    Current Weight: Weight - Scale: 60.1 kg (132 lb 7.9 oz)  First Weight: Weight - Scale: 63.5 kg (140 lb)  I/O         12/14 0701  12/15 0700 12/15 0701 12/16 0700 12/16 0701 12/17 0700    P.O.  720     I.V. (mL/kg)       Total Intake(mL/kg)  720 (12)     Urine (mL/kg/hr) 4000 (2.8) 9175 (6.4)     Total Output 4000 9175     Net -4000  -0111                  Physical Exam  General:  no acute distress at this time  Skin:  No acute rash  Eyes:  No scleral icterus and noninjected  ENT:  mucous membranes moist  Neck:  no carotid bruits  Chest:  Clear to auscultation percussion, good respiratory effort, no use of accessory respiratory muscles  CVS:  Regular rate and rhythm without rub   Abdomen:  soft and nontender   Extremities: no significant lower extremity edema  Neuro:  No gross focality  Psych:  Alert , cooperative     Medications:    Current Facility-Administered Medications:     acetaminophen (TYLENOL) tablet 650 mg, 650 mg, Oral, Q6H PRN, TONY Godoy, 650 mg at 12/12/24 2246    ALPRAZolam (XANAX) tablet 0.5 mg, 0.5 mg, Oral, BID PRN, TONY Fox    butalbital-acetaminophen-caffeine (FIORICET,ESGIC) -40 mg per tablet 1 tablet, 1 tablet, Oral, Q4H PRN, TONY Fox, 1 tablet at 12/13/24 2008    HYDROmorphone HCl (DILAUDID) injection 0.2 mg, 0.2 mg, Intravenous, Q2H PRN, Angie Rodriguez PA-C    metoclopramide (REGLAN) injection 10 mg, 10 mg, Intravenous, Q6H PRN, TONY Fox, 10 mg at 12/14/24 1830    metoprolol (LOPRESSOR) injection 5 mg, 5 mg, Intravenous, Q6H PRN, TONY Gomez, 5 mg at 12/14/24 2247    naloxone (NARCAN) 0.04 mg/mL syringe 0.04 mg, 0.04 mg, Intravenous, Q1MIN PRN, Angie Rodriguez PA-C    ondansetron (ZOFRAN-ODT) dispersible tablet 4 mg, 4 mg, Oral, Q6H PRN, Angie Rodriguez PA-C, 4 mg at 12/14/24 0427    oxyCODONE (ROXICODONE) split tablet 2.5 mg, 2.5 mg, Oral, Q4H PRN **OR** oxyCODONE (ROXICODONE) IR tablet 5 mg, 5 mg, Oral, Q4H PRN, Angie Rodriguez PA-C, 5 mg at 12/10/24 0404      Lab Results: I have reviewed the following results:  Results from last 7 days   Lab Units 12/16/24  0515 12/15/24  1558 12/15/24  0459 12/14/24  2111 12/14/24  0752 12/13/24  2229 12/13/24  1510 12/13/24  0934 12/13/24  0232 12/12/24  1159 12/12/24  0317 12/11/24  0338 12/10/24  0403  "  WBC Thousand/uL 6.64  --  9.73  --  11.49*  --   --   --  15.20*  --  14.90* 14.08* 15.78*   HEMOGLOBIN g/dL 11.8*  --  12.0  --  11.3*  --   --   --  12.7  --  11.6* 10.4* 11.8*   HEMATOCRIT % 34.4*  --  34.6*  --  31.9*  --   --   --  36.9  --  34.1* 30.5* 35.1*   PLATELETS Thousands/uL 317  --  281  --  237  --   --   --  233  --  237 221 258   POTASSIUM mmol/L 4.2 3.8 3.8  3.8 4.0 4.1 4.0 4.1   < > 5.3   < > 3.8 3.3* 3.8   CHLORIDE mmol/L 106 102 97  97 96 97 97 98   < > 93*   < > 97 103 101   CO2 mmol/L 25 24 22  22 22 20* 22 20*   < > 25   < > 22 26 29   BUN mg/dL 14 16 17  17 18 21 22 22   < > 17   < > 10 12 17   CREATININE mg/dL 0.98 1.11 1.10  1.12 1.31* 0.97 1.25 1.17   < > 1.43*   < > 0.96 0.92 1.05   CALCIUM mg/dL 8.2* 8.2* 8.2*  8.1* 8.2* 8.1* 8.3* 8.1*   < > 8.5   < > 8.1* 8.1* 8.6   MAGNESIUM mg/dL 2.6  --  2.4  --  2.1  --   --   --   --   --   --   --   --    PHOSPHORUS mg/dL 3.0  --  3.1  --  2.7  --   --   --   --   --   --   --   --    ALBUMIN g/dL 3.3*  --   --   --   --   --   --   --   --   --   --  3.4*  --     < > = values in this interval not displayed.       Administrative Statements     Portions of the record may have been created with voice recognition software. Occasional wrong word or \"sound a like\" substitutions may have occurred due to the inherent limitations of voice recognition software. Read the chart carefully and recognize, using context, where substitutions have occurred.If you have any questions, please contact the dictating provider.  "

## 2024-12-16 NOTE — DISCHARGE SUMMARY
Discharge Summary - Urology   Name: Roby Louis 54 y.o. male I MRN: [id number]  Unit/Bed#: S -01 I Date of Admission: 12/9/2024   Date of Service: 12/16/2024 I Hospital Day: 6    Discharge Summary - Roby Louis 54 y.o. male MRN: [id number]    Unit/Bed#: S -01 Encounter: [id number]    Admission Date: 12/9/2024     Discharge Date: 12/16/24    HPI: Roby Louis is a 54 y.o. male who presented for obotic simple prostatectomy on 12/9, operation went as planned.  Procedure(s):  ROBOTIC LAPAROSCOPIC SUPRAPUBIC PROSTATECTOMY  Surgeon(s):  MD Tracy Peoples PA-C  12/9/2024    Hospital Course: Patient presented for robotic simple prostatectomy on 12/9, operation went as planned. See op note for more details. POD1 patient was progressing well. He had symptoms of a headache, Nausea. He Developed a fever POD2- started on azactam due to preop urine cxs. Patient had good UOP, Fevers with Tmax 101F. Patient developed hyponatremia postoperatively and nephrology was consults. He did not tolerate salt tablets and was started on 1.8 % saline IV fluids, lasix. Patient had persistent tachycardia that did not respond to IV fluids and CTA was obtained, negative for PE. On 12/16 patient remains afebrile x 48hours. Denies any pain, SOB, chest pain, nausea. He received 7 days azactam.     Diagnosis of sepsis is treated and resolved with IV antibiotics.     Discharge Diagnosis: BPH with obstruction/lower urinary tract symptoms    Condition at Discharge: good    Discharge Medications:  See after visit summary for reconciled discharge medications provided to patient and family.  Patient was discharged home on home medications with the addition of senna, NSAIDs, tylenol.     Discharge instructions/Information to patient and family:   See after visit summary for written and verbal information which has been provided to patient and family.      Provisions for Follow-Up Care:  See after visit summary for  information related to follow-up care and any pertinent home health orders.      Disposition: Home    Planned Readmission: No    Discharge Statement   I spent 15  minutes discharging the patient. This time was spent on the day of discharge. I had direct contact with the patient on the day of discharge. Additional documentation is required if more than 30 minutes were spent on discharge.     Signature:   Viry Granado PA-C  Date: 12/16/2024 Time: 3:39 PM
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
## 2024-12-16 NOTE — TREATMENT PLAN
Renal Short note-sodium level rising appropriately this afternoon.  Hold fluids for rest of evening.  Maintain liberalized free water intake.  To note patient received one-time tolvaptan and pH balance fluids low-dose today

## 2024-12-16 NOTE — ASSESSMENT & PLAN NOTE
Sinus tachycardia on EKG  Consult internal medicine for evaluation.  Obtained CT PE protocol--negative for embolus

## 2024-12-16 NOTE — ASSESSMENT & PLAN NOTE
S/p robotic assisted laparoscopic suprapubic prostatectomy by Dr. Herzog 12/9  Afebrile  Received 7 days Azactam  Labs stable. Hyponatremia resolved. No leukocytosis and renal function at baseline  CTA negative for PE      Stable for discharge  Outpatient TOV

## 2024-12-16 NOTE — ASSESSMENT & PLAN NOTE
Volume: Euvolemic on exam  Blood pressure: Hypertensive, /101  Recommend:  Goal less than 140/90  Can resume ACE inhibitors  Agree with amlodipine if no  BP control

## 2024-12-16 NOTE — ASSESSMENT & PLAN NOTE
Sodium on admission:   Serum osm: 262  Urine osm: 495  Serum sodium 126  Etiology: Likely secondary to SIADH   Current sodium 138 mg/L, resolved after tolvaptan   Recommend relative fluid restriction 1.8 L to 2 L alternating drinks   No indication of sodium chloride tablets at this time  Monitor BMP   Sodium goal > 133  Follow-up with PCP or nephrology on discharge\

## 2024-12-16 NOTE — ASSESSMENT & PLAN NOTE
Likely inappropriate/increased ADH   Did not tolerate prescribed salt tablets  Per nephrology--fluid management, tolvaptan x 1 dose hold ACE inhibitors  --Comanagement well-appreciated  Sodium 138 this AM

## 2024-12-16 NOTE — CASE MANAGEMENT
Case Management Assessment & Discharge Planning Note    Patient name Roby Louis  Location S /S -01 MRN [id number]  : 1970 Date 2024       Current Admission Date: 2024  Current Admission Diagnosis:BPH with obstruction/lower urinary tract symptoms   Patient Active Problem List    Diagnosis Date Noted Date Diagnosed    Hyponatremia 2024     Fever 2024     HTN (hypertension) 2024     Person consulting for explanation of examination or test finding 2024     Urinary retention due to benign prostatic hyperplasia 2024     Elevated PSA 2024     Unintentional weight loss 2024     Tachycardia 2024     Chronic right shoulder pain 2021     Murmur 2021     Hypertension 2021     BPH with obstruction/lower urinary tract symptoms 2021     Chronic right-sided low back pain with right-sided sciatica 2021       LOS (days): 6  Geometric Mean LOS (GMLOS) (days): 2.2  Days to GMLOS:-3.8     OBJECTIVE:    Risk of Unplanned Readmission Score: 8.27         Current admission status: Inpatient       Preferred Pharmacy:   [id number] - [place], [place] - [address] [address]  Phone: [phone] Fax: [phone]    Primary Care Provider: Elias Schoen, MD    Primary Insurance:   Secondary Insurance:     ASSESSMENT:  Active Health Care Proxies    There are no active Health Care Proxies on file.                 Readmission Root Cause  30 Day Readmission: No    Patient Information  Admitted from:: Home  Mental Status: Alert  During Assessment patient was accompanied by: Not accompanied during assessment  Assessment information provided by:: Patient  Primary Caregiver: Self  Support Systems: Self, Spouse/significant other, Daughter  County of Residence: [place]  What city do you live in?: [place]  Home entry access options. Select all that apply.: Stairs  Number of steps to enter home.:  10  Type of Current Residence: [facility]  Living Arrangements: Lives w/ Spouse/significant other    Activities of Daily Living Prior to Admission  Functional Status: Independent  Completes ADLs independently?: Yes  Ambulates independently?: Yes  Does patient use assisted devices?: No  Does patient currently own DME?: No  Does patient have a history of Outpatient Therapy (PT/OT)?: Yes  Does the patient have a history of Short-Term Rehab?: No  Does patient have a history of HHC?: Yes  Does patient currently have HHC?: No         Patient Information Continued  Income Source: Employed  Does patient have prescription coverage?: No  Does patient receive dialysis treatments?: No  Does patient have a history of substance abuse?: No  Does patient have a history of Mental Health Diagnosis?: No         Means of Transportation  Means of Transport to Appts:: Drives Self          DISCHARGE DETAILS:    Discharge planning discussed with:: Patient  Freedom of Choice: Yes  Comments - Freedom of Choice: Return home  CM contacted family/caregiver?: No- see comments (Pt A&O)  Were Treatment Team discharge recommendations reviewed with patient/caregiver?: Yes  Did patient/caregiver verbalize understanding of patient care needs?: Yes  Were patient/caregiver advised of the risks associated with not following Treatment Team discharge recommendations?: Yes    Contacts  Patient Contacts: Patient  Contact Method: In Person  Reason/Outcome: Continuity of Care, Discharge Planning    Other Referral/Resources/Interventions Provided:  Financial Resources Provided: Financial Counselor  Interventions: Other (Specify)  Referral Comments: CM spoke with pt at bedside, introduced self and role with dcp. Pt reported he lives with his spouse in a ranch style home with about 10 J[address]. Pt reported he is fully IPTA, does not own or ened DME. Pt reported being employed and driving. Pt reported a hx of HHC and OPPT. Pt reported preferred pharmacy is [facility]. Pt reported  he does not have insurance, email sent to hospital financial counselors to initiate a PATHS referral. CM will continue to follow to assist with dcp.    Treatment Team Recommendation: Home  Discharge Destination Plan:: Home  Transport at Discharge : Family

## 2024-12-17 ENCOUNTER — TRANSITIONAL CARE MANAGEMENT (OUTPATIENT)
Dept: FAMILY MEDICINE CLINIC | Facility: CLINIC | Age: 54
End: 2024-12-17

## 2024-12-17 VITALS
OXYGEN SATURATION: 83 % | BODY MASS INDEX: 20.8 KG/M2 | DIASTOLIC BLOOD PRESSURE: 71 MMHG | HEIGHT: 67 IN | WEIGHT: 132.5 LBS | HEART RATE: 68 BPM | TEMPERATURE: 98.1 F | RESPIRATION RATE: 18 BRPM | SYSTOLIC BLOOD PRESSURE: 140 MMHG

## 2024-12-17 NOTE — ANESTHESIA POSTPROCEDURE EVALUATION
Post-Op Assessment Note    CV Status:  Stable  Pain Score: 2    Pain management: adequate       Mental Status:  Alert and awake   Hydration Status:  Euvolemic   PONV Controlled:  Controlled   Airway Patency:  Patent     Post Op Vitals Reviewed: Yes    No anethesia notable event occurred.    Staff: Anesthesiologist           Last Filed PACU Vitals:  Vitals Value Taken Time   Temp 98 °F (36.7 °C) 12/09/24 1700   Pulse 96 12/09/24 1715   /94 12/09/24 1715   Resp 14 12/09/24 1715   SpO2 98 % 12/09/24 1715       Modified Artie:  No data recorded

## 2024-12-18 NOTE — TELEPHONE ENCOUNTER
Post Op Note    Roby Louis is a 54 y.o. male s/p ROBOTIC LAPAROSCOPIC SUPRAPUBIC PROSTATECTOMY (Abdomen)  performed 12/9/24.  Roby Louis is a patient of Dr. Dr. Herzog and is seen at the [place] office.     Attempted to call patient. There was no answer and there was no option to leave a VM      If patient calls back please relay the following   It is normal to have some abdominal discomfort due to the surgical sites. There might be some light bruising. Monitor the sites daily to ensure there is no signs of infection (increasing redness, pus like fluid coming out or hot to the touch skin). Any signs of infection please call the office.      Please ask if he has any questions regarding caring for your catheter? If any discomfort on penis can use neosporin to help.      Please monitor for being able to pass gas and an eventual bowel movement. Take your colace as prescribed. If unable to have a bowel movement by day 4 post op can use miralax or prune juice to assist. Do not use any suppositories or enemas. If not having bowel movements and having increased abdominal pain please call office.      Please make sure you are using your incentive spirometer hourly to keep your breathing regular. Please get up and move around the house regularly. No heavy lifting.      Call the office with any signs of infection, fevers, wine colored urine, or a clogged littlejohn.      Please confirm post op  littlejohn removal and follow up. Please reach out with any questions or concerns

## 2024-12-19 NOTE — UTILIZATION REVIEW
Initial Clinical Review    Elective outpatient procedure, converted to inpatient admission due to need for pain control. Monitoring tolerance to diet advancement .     Elective outpt  surgical procedure  Age/Sex: 54 y.o. male with hx BPH with obstruction/lower urinary tract symptoms   Surgery Date: 12/9/2024 12:52 PM   Procedure: ROBOTIC LAPAROSCOPIC SUPRAPUBIC PROSTATECTOMY   Anesthesia: General   Operative Findings: Trilobar hypertrophy with significant intravesical protrusion of the prostate.  Successful robot-assisted laparoscopic simple prostatectomy with removal of the central and transition zone with preservation of the prostatic capsule and posterior/peripheral zone of the prostate.  Come [place] bladder neck reconstruction performed.  Watertight anastomosis at the end of today's case.  Continuous bladder irrigation is running clear at the end of today's procedure    ml       POD#1 Progress Note: 12/10  Converted to IP  Offers generalized somatic complaints including headache, anorexia, nausea without vomiting, and generalized weakness. Pt mainly only taking in CL due to nausea and poor appetite. IVF infusing . Pt given x 1 metoprolol overnight for elevated BP .  CBI infusing with littlejohn patent for light blush urine  . To maintain littlejohn upon d/c   . YUMI drain LLQ  patent for 15 ml overnight . On exam, Pt has  moderate tenderness in the lower abdomen and puncture sites intact.  Lower abdominal incision with Dermabond.  No signs of dehiscence. No appreciable increasing abdominal girth or distention Ordered abdominal binder  WBC 15.78 - likely reactive .  Hgb stable .  .Normal renal function. PLan- Aggressive ambulation . Clamp CBI after current bag. Monitor YUMI output. . Continue IVF . Will require an additional night stay to work on diet tolerance and pain control.      Date 12/11 Day 2    Temp 101.9 F overnight , tachycardic . Ordered CXR- neg for acute findings  , blood cx ordered today .  On po  Cephalexin . WBC 14.08 today . Pt refusing IVF ordered this am .Lower abdominal pain-/mild. Complains of headache . Abdomen with puncture sites and incision--Dermabond, clean dry and intact well-approximated without evidence of dehiscence or crepitus.  YUMI drain--scant drainage of serosanguineous--removed without incident.   Maintain littlejohn cath upon discharge with void trial scheduled for 12/23/24.  . Pt tolerating clear liquids.  Patient expresses fear/anxiety over advancing diet. Patient has verbalized increasing anxiety within the hospital environment.  Ordered prn Xanax for anxiety . If afebrile this afternoon, plan for d/c to home with recovery in familiar surroundings. . K 3.3- repletion ordered. CBC, BMP in am  if remains hospitalized .    Update temp 101.2 F @ 1400 today     Admission Orders: Date/Time/Statement:   Admission Orders (From admission, onward)       Ordered        12/10/24 1435  INPATIENT ADMISSION  Once                          Orders Placed This Encounter   Procedures    INPATIENT ADMISSION     Standing Status:   Standing     Number of Occurrences:   1     Level of Care:   Med Surg [16]     Estimated length of stay:   More than 2 Midnights     Certification:   I certify that inpatient services are medically necessary for this patient for a duration of greater than two midnights. See H&P and MD Progress Notes for additional information about the patient's course of treatment.     Diet: reg diet   Mobility: Up as cyndee  DVT Prophylaxis: SCD, ambulation     Medications/Pain Control:   Scheduled Medications:  No current facility-administered medications for this encounter.  metoclopramide (REGLAN) injection 10 mg  Dose: 10 mg  Freq: Once Route: IV  Start: 12/10/24 0400 End: 12/10/24 0405  acetaminophen (Ofirmev) injection 1,000 mg  Dose: 1,000 mg  Freq: Once Route: IV  Last Dose: 1,000 mg (12/09/24 1645)  Start: 12/09/24 1645 End: 12/09/24 1700  potassium chloride (Klor-Con M20) CR tablet 40  mEq  Dose: 40 mEq  Freq: Once Route: PO  Start: 12/11/24 0945 End: 12/11/24 1005    Continuous IV Infusions:  lactated ringers, 125 mL/hr, Intravenous, ContinuousStart: 12/09/24 1815 End: 12/11/24 0930   sodium chloride 0.9 % infusion  Rate: 75 mL/hr Dose: 75 mL/hr  Freq: Continuous Route: IV  Indications of Use: IV Hydration  Start: 12/11/24 0930- pt refusing       PRN Meds:  butalbital-acetaminophen-caffeine, 1 tablet, Oral, Q4H PRN x1 12/10, x1 12/11  HYDROmorphone, 0.2 mg, Intravenous, Q2H PRN  lactated ringers, 1,000 mL, Intravenous, Once PRN   And  lactated ringers, 1,000 mL, Intravenous, Once PRN x1 12/10 @ 1536   metoprolol, 5 mg, Intravenous, Q6H PRNSbp > 160  x1 12/10   naloxone, 0.04 mg, Intravenous, Q1MIN PRN  ondansetron, 4 mg, Oral, Q6H PRN    x1  12/9 , x1 12/10   oxyCODONE, 2.5 mg, Oral, Q4H PRN   Or  oxyCODONE, 5 mg, Oral, Q4H PRN x1 12/9, x1 12/10   ALPRAZolam (XANAX) tablet 0.5 mg  Dose: 0.5 mg  Freq: 2 times daily PRN Route: PO  PRN Reason: anxiety  Start: 12/11/24 0929  HYDROmorphone (DILAUDID) injection 0.5 mg  Dose: 0.5 mg  Freq: Every 5 minutes PRN Route: IV  PRN Reason: Pain - PACU  PRN Comment: Breakthrough Pain. Second Line  Start: 12/09/24 1533 End: 12/09/24 1615   x2 12/9   HYDROmorphone (DILAUDID) injection 0.5 mg  Dose: 0.5 mg  Freq: Every 5 minutes PRN Route: IV  PRN Reasons: severe pain,breakthrough pain  Start: 12/09/24 1632 End: 12/09/24 1643 x2 12/9   fentaNYL (SUBLIMAZE) injection 25 mcg  Dose: 25 mcg  Freq: Every 5 minutes PRN Route: IV  PRN Reason: Pain - PACU  PRN Comment: Breakthrough - first line  Start: 12/09/24 1533 End: 12/09/24 1606 x4 12/9   acetaminophen (TYLENOL) tablet 650 mg  Dose: 650 mg  Freq: Every 4 hours PRN Route: PO  PRN Reason: mild pain  Start: 12/09/24 1824 End: 12/10/24 1307 x1 12/9   acetaminophen (TYLENOL) tablet 650 mg  Dose: 650 mg  Freq: Every 6 hours PRN Route: PO  PRN Reasons: mild pain,fever  Start: 12/10/24 1712    x2 12/11         Vital Signs  (last 3 days) before discharge       Date/Time Temp Pulse Resp BP MAP (mmHg) SpO2 O2 Device Patient Position - Orthostatic VS [place] Coma Scale Score Pain    12/16/24 20:03:42 97.6 °F (36.4 °C) 74 16 162/85 111 97 % -- -- -- --    12/16/24 14:51:57 97.8 °F (36.6 °C) 79 16 144/95 111 98 % -- -- -- --    12/16/24 0730 -- -- -- -- -- -- -- -- -- No Pain    12/16/24 0721 99.7 °F (37.6 °C) 95 14 141/101 114 97 % -- -- -- --    12/16/24 0001 -- -- -- -- -- -- -- -- 15 No Pain    12/15/24 22:30:18 97.3 °F (36.3 °C) 91 14 147/100 116 99 % None (Room air) -- -- --    12/15/24 15:06:04 98.6 °F (37 °C) 98 19 124/86 99 98 % -- -- -- --    12/15/24 1156 -- -- -- -- -- -- -- -- 15 No Pain    12/15/24 07:59:51 99.3 °F (37.4 °C) 101 -- 113/83 93 96 % -- -- -- --    12/14/24 23:44:14 -- 109 -- 121/84 96 97 % -- Lying -- --    12/14/24 2340 -- -- -- 147/90 -- -- -- -- -- --    12/14/24 2216 -- -- -- 186/100 -- -- -- -- -- --    12/14/24 22:12:05 100.9 °F (38.3 °C) 107 16 158/113 128 99 % -- -- -- --    12/14/24 15:27:36 99.8 °F (37.7 °C) 104 18 159/99 119 97 % -- -- -- --    12/14/24 0930 -- -- -- -- -- -- -- -- 15 --    12/14/24 08:28:18 98.8 °F (37.1 °C) 116 -- -- -- 97 % -- -- -- No Pain    12/14/24 07:58:14 100.3 °F (37.9 °C) 116 17 131/91 104 96 % -- -- -- --    12/13/24 22:26:20 -- 80 16 123/85 98 99 % -- -- -- --    12/13/24 2226 99.8 °F (37.7 °C) -- -- -- -- -- -- -- -- --    12/13/24 2100 -- -- -- -- -- -- -- -- 15 --    12/13/24 2008 -- -- -- -- -- -- -- -- -- 7    12/13/24 19:58:03 99.7 °F (37.6 °C) 121 18 123/90 101 98 % -- -- -- --    12/13/24 14:53:48 99.8 °F (37.7 °C) 121 16 130/87 101 98 % -- -- -- --    12/13/24 08:21:31 99.3 °F (37.4 °C) 120 16 131/91 104 96 % -- -- -- --    12/13/24 0800 -- -- -- -- -- -- -- -- 15 No Pain    12/13/24 02:32:55 97.6 °F (36.4 °C) 94 -- -- -- 98 % -- -- -- --          Weight (last 2 days) before discharge       Date/Time Weight    12/16/24 0532 60.1 (132.5)    12/15/24 0543 59.8  (131.84)    12/14/24 0600 59.8 (131.84)            Pertinent Labs/Diagnostic Test Results:    12/11 CXR- await read         Results from last 7 days   Lab Units 12/16/24  0515 12/15/24  0459 12/14/24 0752 12/13/24  0232   WBC Thousand/uL 6.64 9.73 11.49* 15.20*   HEMOGLOBIN g/dL 11.8* 12.0 11.3* 12.7   HEMATOCRIT % 34.4* 34.6* 31.9* 36.9   PLATELETS Thousands/uL 317 281 237 233         Results from last 7 days   Lab Units 12/16/24  0515 12/15/24  1558 12/15/24  0459 12/14/24 2111 12/14/24  0752   SODIUM mmol/L 138 133* 127*  127* 125* 125*   POTASSIUM mmol/L 4.2 3.8 3.8  3.8 4.0 4.1   CHLORIDE mmol/L 106 102 97  97 96 97   CO2 mmol/L 25 24 22  22 22 20*   ANION GAP mmol/L 7 7 8  8 7 8   BUN mg/dL 14 16 17  17 18 21   CREATININE mg/dL 0.98 1.11 1.10  1.12 1.31* 0.97   EGFR ml/min/1.73sq m 86 74 75  73 60 87   CALCIUM mg/dL 8.2* 8.2* 8.2*  8.1* 8.2* 8.1*   MAGNESIUM mg/dL 2.6  --  2.4  --  2.1   PHOSPHORUS mg/dL 3.0  --  3.1  --  2.7     Results from last 7 days   Lab Units 12/16/24  0515   AST U/L 29   ALT U/L 40   ALK PHOS U/L 61   TOTAL PROTEIN g/dL 7.1   ALBUMIN g/dL 3.3*   TOTAL BILIRUBIN mg/dL 0.26   BILIRUBIN DIRECT mg/dL 0.01         Results from last 7 days   Lab Units 12/16/24  0515 12/15/24  1558 12/15/24  0459 12/14/24 2111 12/14/24  0752 12/13/24  2229 12/13/24  1510 12/13/24  0934 12/13/24  0232 12/12/24 2010 12/12/24  1627 12/12/24  1159   GLUCOSE RANDOM mg/dL 112 126 112  111 216* 84 97 113 90 99 127 135 130     Results from last 7 days   Lab Units 12/12/24  1627   OSMOLALITY, SERUM mmol/*         Network Utilization Review Department  ATTENTION: Please call with any questions or concerns to [phone] and carefully listen to the prompts so that you are directed to the right person. All voicemails are confidential.   For Discharge needs, contact Care Management DC Support Team at [phone] opt. 2  Send all requests for admission clinical reviews, approved or denied  determinations and any other requests to dedicated fax number below belonging to the campus where the patient is receiving treatment. List of dedicated fax numbers for the Facilities:  FACILITY NAME UR FAX NUMBER   ADMISSION DENIALS (Administrative/Medical Necessity) [phone]   DISCHARGE SUPPORT TEAM (NETWORK) [phone]   PARENT CHILD HEALTH (Maternity/NICU/Pediatrics) [phone]   [facility] [phone]   [facility] [phone]   [facility] [phone]   [facility] [phone]   [facility] [phone]   [facility] [phone]   [facility] [phone]   [facility] [phone]   [facility] [phone]   [facility] [phone]   [facility] [phone]   [facility] [phone]

## 2024-12-19 NOTE — UTILIZATION REVIEW
NOTIFICATION OF INPATIENT ADMISSION   AUTHORIZATION REQUEST   SERVICING FACILITY:   [place] [address]  Tax ID: [id number]  NPI: [id number]   ATTENDING PROVIDER:  Attending Name and NPI#: Keegan Herzog Md [[id number]]  Address: [address] [place] [address]  Phone: [phone]     ADMISSION INFORMATION:  Place of Service: Inpatient [facility]  Place of Service Code: 21  Inpatient Admission Date/Time: 12/10/24  2:35 PM  Discharge Date/Time: 12/16/2024  6:15 PM  Admitting Diagnosis Code/Description:  BPH with obstruction/lower urinary tract symptoms [N40.1, N13.8]  Urinary retention due to benign prostatic hyperplasia [N40.1, R33.8]     UTILIZATION REVIEW CONTACT:  Sadia Ca Utilization   Network Utilization Review Department  Phone: [phone]  Fax: [phone]  Email: [email or web address]  Contact for approvals/pending authorizations, clinical reviews, and discharge.     PHYSICIAN ADVISORY SERVICES:  Medical Necessity Denial & Nxar-qg-Ccrg Review  Phone: [phone]  Fax: [phone]  Email: [email or web address]     DISCHARGE SUPPORT TEAM:  For Patients Discharge Needs & Updates  Phone: [phone] opt. 2 Fax: [phone]  Email: [email or web address]       No

## 2024-12-19 NOTE — TELEPHONE ENCOUNTER
Post Op Note     Roby Louis is a 54 y.o. male s/p ROBOTIC LAPAROSCOPIC SUPRAPUBIC PROSTATECTOMY (Abdomen)  performed 12/9/24.  Roby Louis is a patient of Dr. Dr. Herzog and is seen at the [place] office.        Call attempt #2  Attempted to call patient. There was no answer and there was no option to leave a VM      If patient calls back please relay the following   It is normal to have some abdominal discomfort due to the surgical sites. There might be some light bruising. Monitor the sites daily to ensure there is no signs of infection (increasing redness, pus like fluid coming out or hot to the touch skin). Any signs of infection please call the office.      Please ask if he has any questions regarding caring for your catheter? If any discomfort on penis can use neosporin to help.      Please monitor for being able to pass gas and an eventual bowel movement. Take your colace as prescribed. If unable to have a bowel movement by day 4 post op can use miralax or prune juice to assist. Do not use any suppositories or enemas. If not having bowel movements and having increased abdominal pain please call office.      Please make sure you are using your incentive spirometer hourly to keep your breathing regular. Please get up and move around the house regularly. No heavy lifting.      Call the office with any signs of infection, fevers, wine colored urine, or a clogged littlejohn.      Please confirm post op  littlejohn removal and follow up. Please reach out with any questions or concerns

## 2024-12-20 DIAGNOSIS — I10 PRIMARY HYPERTENSION: ICD-10-CM

## 2024-12-20 RX ORDER — LISINOPRIL 40 MG/1
40 TABLET ORAL DAILY
Qty: 30 TABLET | Refills: 0 | Status: SHIPPED | OUTPATIENT
Start: 2024-12-20

## 2024-12-20 NOTE — TELEPHONE ENCOUNTER
Call attempt #4  Attempted to call patient. There was no answer and there was no option to leave a VM     If patient calls the office please confirm the TOV for monday

## 2024-12-23 ENCOUNTER — PROCEDURE VISIT (OUTPATIENT)
Dept: UROLOGY | Facility: CLINIC | Age: 54
End: 2024-12-23
Payer: MEDICAID

## 2024-12-23 VITALS
BODY MASS INDEX: 20.72 KG/M2 | OXYGEN SATURATION: 100 % | DIASTOLIC BLOOD PRESSURE: 90 MMHG | HEIGHT: 67 IN | SYSTOLIC BLOOD PRESSURE: 140 MMHG | WEIGHT: 132 LBS | HEART RATE: 99 BPM

## 2024-12-23 DIAGNOSIS — R33.9 URINARY RETENTION: Primary | ICD-10-CM

## 2024-12-23 LAB — POST-VOID RESIDUAL VOLUME, ML POC: 44 ML

## 2024-12-23 PROCEDURE — 51798 US URINE CAPACITY MEASURE: CPT

## 2024-12-23 PROCEDURE — 99024 POSTOP FOLLOW-UP VISIT: CPT

## 2024-12-23 NOTE — PROGRESS NOTES
"12/23/2024    Roby Louis  1970  [id number]    Diagnosis  Chief Complaint    TOV         Patient presents for TOV managed by our office    Plan  Plan to follow up with Dr. Herzog as scheduled     Procedure Granda removal/voiding trial    Granda catheter removed after deflation of an intact balloon. Patient tolerated well. Encouraged patient to hydrate well and return this afternoon for post void residual.  he knows he may return early if uncomfortable and unable to urinate. Patient agrees to this plan.    Patient returned this afternoon. Patient states able to void. Patient voided while in office. Bladder ultrasound performed and PVR measured 44ml.    Recent Results (from the past 4 hours)   POCT Measure PVR    Collection Time: 12/23/24  3:17 PM   Result Value Ref Range    POST-VOID RESIDUAL VOLUME, ML POC 44 mL       Vitals:    12/23/24 0913   BP: 140/90   Pulse: 99   SpO2: 100%   Weight: 59.9 kg (132 lb)   Height: 5' 7\" (1.702 m)           Sarah Serna RN       "

## 2024-12-25 NOTE — PROGRESS NOTES
Name: Roby Louis      : 1970      MRN: [id number]  Encounter Provider: Keegan Herzog MD  Encounter Date: 2024   Encounter department: [facility] UROLOGY [place]  :  Assessment & Plan  BPH with obstruction/lower urinary tract symptoms  -Status post robotic simple prostatectomy on 24- incisions healing well.  -reviewed pathology with patient- benign   -will continue to follow symptoms   Elevated PSA  -PSA prior to surgery 9  -repeat PSA prior to next office visit         Follow up in 6 months with psa prior     History of Present Illness   Roby Louis is a 54 y.o. male who presents today for post op simple prostatectomy, benign pathology. States he has some urgency at times. Reports some blood tinged urine however mostly clear. Reports emptying well.     Review of Systems   Constitutional:  Negative for chills and fever.   Gastrointestinal:  Negative for abdominal pain, blood in stool, constipation and diarrhea.   Genitourinary:  Positive for frequency, hematuria and urgency. Negative for difficulty urinating, dysuria, enuresis and flank pain.          Objective   There were no vitals taken for this visit.    Physical Exam  Constitutional:       General: He is not in acute distress.     Appearance: He is normal weight. He is not toxic-appearing.   HENT:      Head: Normocephalic.   Eyes:      Conjunctiva/sclera: Conjunctivae normal.   Cardiovascular:      Rate and Rhythm: Normal rate.   Pulmonary:      Effort: Pulmonary effort is normal. No respiratory distress.   Abdominal:      Tenderness: There is no abdominal tenderness. There is no guarding.      Comments: Surgical incisions well approximated with glue flaking off. No redness/swelling at incision sites.    Skin:     General: Skin is warm.   Neurological:      Mental Status: He is alert and oriented to person, place, and time.   Psychiatric:         Mood and Affect: Mood normal.         Thought Content: Thought content normal.           Results  Lab Results   Component Value Date    PSA 9.061 (H) 11/19/2024    PSA 4.0 04/05/2021     Lab Results   Component Value Date    CALCIUM 8.2 (L) 12/16/2024    K 4.2 12/16/2024    CO2 25 12/16/2024     12/16/2024    BUN 14 12/16/2024    CREATININE 0.98 12/16/2024     Lab Results   Component Value Date    WBC 6.64 12/16/2024    HGB 11.8 (L) 12/16/2024    HCT 34.4 (L) 12/16/2024    MCV 85 12/16/2024     12/16/2024       Office Urine Dip  No results found for this or any previous visit (from the past hour).]    Case Report   Surgical Pathology Report                         Case: [id number]                                   Authorizing Provider:  Keegan Herzog MD        Collected:           12/09/2024 1354               Ordering Location:     [facility]        Received:            12/09/2024 [address] [place] [address] Operating Room                                                       Pathologist:           Roque Mccann MD                                                       Specimen:    Prostate, prostate adenoma                                                                Final Diagnosis   A. Prostate, prostate adenoma ( 50.4 gm)TURP      -Circumscribed nodular Prostatic tissue with glandular & stromal hyperplasia , consistent with prostatic  adenoma  -Background of mild acute & chronic inflammation  -Overlying urothelium with chronic inflammation          Electronically signed by Roque Mccann MD on 12/12/2024 at 0940 EST

## 2024-12-30 ENCOUNTER — OFFICE VISIT (OUTPATIENT)
Dept: UROLOGY | Facility: CLINIC | Age: 54
End: 2024-12-30

## 2024-12-30 VITALS
WEIGHT: 132 LBS | OXYGEN SATURATION: 99 % | SYSTOLIC BLOOD PRESSURE: 136 MMHG | HEIGHT: 67 IN | DIASTOLIC BLOOD PRESSURE: 80 MMHG | HEART RATE: 98 BPM | BODY MASS INDEX: 20.72 KG/M2

## 2024-12-30 DIAGNOSIS — R97.20 ELEVATED PSA: ICD-10-CM

## 2024-12-30 DIAGNOSIS — N40.1 BPH WITH OBSTRUCTION/LOWER URINARY TRACT SYMPTOMS: Primary | ICD-10-CM

## 2024-12-30 DIAGNOSIS — N13.8 BPH WITH OBSTRUCTION/LOWER URINARY TRACT SYMPTOMS: Primary | ICD-10-CM

## 2024-12-30 PROCEDURE — 99024 POSTOP FOLLOW-UP VISIT: CPT | Performed by: UROLOGY

## 2024-12-30 NOTE — ASSESSMENT & PLAN NOTE
-Status post robotic simple prostatectomy on 12/9/24- incisions healing well.  -reviewed pathology with patient- benign   -will continue to follow symptoms

## 2025-01-11 PROBLEM — R50.9 FEVER: Status: RESOLVED | Noted: 2024-12-12 | Resolved: 2025-01-11

## 2025-02-03 ENCOUNTER — TELEPHONE (OUTPATIENT)
Dept: FAMILY MEDICINE CLINIC | Facility: CLINIC | Age: 55
End: 2025-02-03

## 2025-02-03 NOTE — TELEPHONE ENCOUNTER
Called pt to see if he would like to schedule a physical.  Pt's phone is disconnected.  Letter sent  
René Childress(Attending)

## 2025-06-14 ENCOUNTER — HOSPITAL ENCOUNTER (INPATIENT)
Facility: HOSPITAL | Age: 55
LOS: 4 days | Discharge: HOME | DRG: 727 | End: 2025-06-19
Attending: EMERGENCY MEDICINE | Admitting: FAMILY MEDICINE
Payer: COMMERCIAL

## 2025-06-14 DIAGNOSIS — N45.1 EPIDIDYMITIS: ICD-10-CM

## 2025-06-14 DIAGNOSIS — N49.0: ICD-10-CM

## 2025-06-14 DIAGNOSIS — N17.9 AKI (ACUTE KIDNEY INJURY) (HCC): Primary | ICD-10-CM

## 2025-06-14 DIAGNOSIS — D72.829 LEUKOCYTOSIS, UNSPECIFIED TYPE: ICD-10-CM

## 2025-06-14 DIAGNOSIS — E87.1 HYPONATREMIA: ICD-10-CM

## 2025-06-14 DIAGNOSIS — N50.812 LEFT TESTICULAR PAIN: ICD-10-CM

## 2025-06-14 PROCEDURE — 87186 SC STD MICRODIL/AGAR DIL: CPT | Performed by: EMERGENCY MEDICINE

## 2025-06-14 PROCEDURE — 99284 EMERGENCY DEPT VISIT MOD MDM: CPT

## 2025-06-14 PROCEDURE — 84145 PROCALCITONIN (PCT): CPT | Performed by: EMERGENCY MEDICINE

## 2025-06-14 PROCEDURE — 99291 CRITICAL CARE FIRST HOUR: CPT | Performed by: EMERGENCY MEDICINE

## 2025-06-14 PROCEDURE — 80048 BASIC METABOLIC PNL TOTAL CA: CPT | Performed by: EMERGENCY MEDICINE

## 2025-06-14 PROCEDURE — 87077 CULTURE AEROBIC IDENTIFY: CPT | Performed by: EMERGENCY MEDICINE

## 2025-06-14 PROCEDURE — 85025 COMPLETE CBC W/AUTO DIFF WBC: CPT | Performed by: EMERGENCY MEDICINE

## 2025-06-14 PROCEDURE — 83735 ASSAY OF MAGNESIUM: CPT

## 2025-06-14 PROCEDURE — 81001 URINALYSIS AUTO W/SCOPE: CPT | Performed by: EMERGENCY MEDICINE

## 2025-06-14 PROCEDURE — 87086 URINE CULTURE/COLONY COUNT: CPT | Performed by: EMERGENCY MEDICINE

## 2025-06-14 PROCEDURE — 36415 COLL VENOUS BLD VENIPUNCTURE: CPT | Performed by: EMERGENCY MEDICINE

## 2025-06-14 PROCEDURE — 84100 ASSAY OF PHOSPHORUS: CPT

## 2025-06-14 PROCEDURE — 81003 URINALYSIS AUTO W/O SCOPE: CPT | Performed by: EMERGENCY MEDICINE

## 2025-06-14 NOTE — LETTER
[address] MEDICAL SURGICAL UNIT  [address] [facility] [id number] [phone]  Dept: [phone]    June 19, 2025     Patient: Roby Louis   YOB: 1970   Date of Visit: 6/14/2025       To Whom it May Concern:    Roby Louis is under my professional care. He was seen in the hospital from 6/14/2025 to 06/19/25. He may return to work on 6/20/25 without limitations. Please excuse Roby for the days of hospitalization    If you have any questions or concerns, please don't hesitate to call.         Sincerely,          Ronel Velazco MD

## 2025-06-15 ENCOUNTER — APPOINTMENT (EMERGENCY)
Dept: CT IMAGING | Facility: HOSPITAL | Age: 55
DRG: 727 | End: 2025-06-15
Payer: COMMERCIAL

## 2025-06-15 ENCOUNTER — APPOINTMENT (EMERGENCY)
Dept: ULTRASOUND IMAGING | Facility: HOSPITAL | Age: 55
DRG: 727 | End: 2025-06-15
Payer: COMMERCIAL

## 2025-06-15 PROBLEM — R65.10 SIRS (SYSTEMIC INFLAMMATORY RESPONSE SYNDROME) (HCC): Status: ACTIVE | Noted: 2025-06-15

## 2025-06-15 PROBLEM — E87.6 HYPOKALEMIA: Status: ACTIVE | Noted: 2025-06-15

## 2025-06-15 PROBLEM — Z90.79 H/O PROSTATECTOMY: Status: ACTIVE | Noted: 2025-06-15

## 2025-06-15 PROBLEM — N17.9 ACUTE KIDNEY INJURY (HCC): Status: RESOLVED | Noted: 2025-06-15 | Resolved: 2025-06-15

## 2025-06-15 PROBLEM — N45.1 EPIDIDYMITIS: Status: ACTIVE | Noted: 2025-06-15

## 2025-06-15 PROBLEM — N17.9 ACUTE KIDNEY INJURY (HCC): Status: ACTIVE | Noted: 2025-06-15

## 2025-06-15 PROBLEM — R39.9 UTI SYMPTOMS: Status: ACTIVE | Noted: 2025-06-15

## 2025-06-15 PROBLEM — N49.0: Status: ACTIVE | Noted: 2025-06-15

## 2025-06-15 LAB
ANION GAP SERPL CALCULATED.3IONS-SCNC: 8 MMOL/L (ref 4–13)
ANION GAP SERPL CALCULATED.3IONS-SCNC: 8 MMOL/L (ref 4–13)
BACTERIA UR QL AUTO: ABNORMAL /HPF
BASOPHILS # BLD AUTO: 0.05 THOUSANDS/ÂΜL (ref 0–0.1)
BASOPHILS # BLD MANUAL: 0 THOUSAND/UL (ref 0–0.1)
BASOPHILS NFR BLD AUTO: 0 % (ref 0–1)
BASOPHILS NFR MAR MANUAL: 0 % (ref 0–1)
BILIRUB UR QL STRIP: NEGATIVE
BUN SERPL-MCNC: 19 MG/DL (ref 5–25)
BUN SERPL-MCNC: 20 MG/DL (ref 5–25)
CALCIUM SERPL-MCNC: 8.7 MG/DL (ref 8.4–10.2)
CALCIUM SERPL-MCNC: 9.1 MG/DL (ref 8.4–10.2)
CHLORIDE SERPL-SCNC: 102 MMOL/L (ref 96–108)
CHLORIDE SERPL-SCNC: 97 MMOL/L (ref 96–108)
CLARITY UR: ABNORMAL
CO2 SERPL-SCNC: 25 MMOL/L (ref 21–32)
CO2 SERPL-SCNC: 28 MMOL/L (ref 21–32)
COLOR UR: ABNORMAL
CREAT SERPL-MCNC: 1.27 MG/DL (ref 0.6–1.3)
CREAT SERPL-MCNC: 1.62 MG/DL (ref 0.6–1.3)
EOSINOPHIL # BLD AUTO: 0.02 THOUSAND/ÂΜL (ref 0–0.61)
EOSINOPHIL # BLD MANUAL: 0.18 THOUSAND/UL (ref 0–0.4)
EOSINOPHIL NFR BLD AUTO: 0 % (ref 0–6)
EOSINOPHIL NFR BLD MANUAL: 1 % (ref 0–6)
ERYTHROCYTE [DISTWIDTH] IN BLOOD BY AUTOMATED COUNT: 14.3 % (ref 11.6–15.1)
ERYTHROCYTE [DISTWIDTH] IN BLOOD BY AUTOMATED COUNT: 14.6 % (ref 11.6–15.1)
GFR SERPL CREATININE-BSD FRML MDRD: 47 ML/MIN/1.73SQ M
GFR SERPL CREATININE-BSD FRML MDRD: 63 ML/MIN/1.73SQ M
GLUCOSE SERPL-MCNC: 101 MG/DL (ref 65–140)
GLUCOSE SERPL-MCNC: 97 MG/DL (ref 65–140)
GLUCOSE UR STRIP-MCNC: NEGATIVE MG/DL
HCT VFR BLD AUTO: 38.4 % (ref 36.5–49.3)
HCT VFR BLD AUTO: 40.6 % (ref 36.5–49.3)
HGB BLD-MCNC: 12.7 G/DL (ref 12–17)
HGB BLD-MCNC: 13.6 G/DL (ref 12–17)
HGB UR QL STRIP.AUTO: 50
IMM GRANULOCYTES # BLD AUTO: 0.14 THOUSAND/UL (ref 0–0.2)
IMM GRANULOCYTES NFR BLD AUTO: 1 % (ref 0–2)
KETONES UR STRIP-MCNC: NEGATIVE MG/DL
LACTATE SERPL-SCNC: 0.8 MMOL/L (ref 0.5–2)
LEUKOCYTE ESTERASE UR QL STRIP: 500
LYMPHOCYTES # BLD AUTO: 1.65 THOUSAND/UL (ref 0.6–4.47)
LYMPHOCYTES # BLD AUTO: 2.09 THOUSANDS/ÂΜL (ref 0.6–4.47)
LYMPHOCYTES # BLD AUTO: 9 % (ref 14–44)
LYMPHOCYTES NFR BLD AUTO: 11 % (ref 14–44)
MAGNESIUM SERPL-MCNC: 2.1 MG/DL (ref 1.9–2.7)
MCH RBC QN AUTO: 28.8 PG (ref 26.8–34.3)
MCH RBC QN AUTO: 29.1 PG (ref 26.8–34.3)
MCHC RBC AUTO-ENTMCNC: 33.1 G/DL (ref 31.4–37.4)
MCHC RBC AUTO-ENTMCNC: 33.5 G/DL (ref 31.4–37.4)
MCV RBC AUTO: 86 FL (ref 82–98)
MCV RBC AUTO: 88 FL (ref 82–98)
MONOCYTES # BLD AUTO: 0.73 THOUSAND/UL (ref 0–1.22)
MONOCYTES # BLD AUTO: 0.87 THOUSAND/ÂΜL (ref 0.17–1.22)
MONOCYTES NFR BLD AUTO: 5 % (ref 4–12)
MONOCYTES NFR BLD: 4 % (ref 4–12)
MUCOUS THREADS UR QL AUTO: ABNORMAL
NEUTROPHILS # BLD AUTO: 15.33 THOUSANDS/ÂΜL (ref 1.85–7.62)
NEUTROPHILS # BLD MANUAL: 15.74 THOUSAND/UL (ref 1.85–7.62)
NEUTS BAND NFR BLD MANUAL: 10 % (ref 0–8)
NEUTS SEG NFR BLD AUTO: 76 % (ref 43–75)
NEUTS SEG NFR BLD AUTO: 83 % (ref 43–75)
NITRITE UR QL STRIP: NEGATIVE
NON-SQ EPI CELLS URNS QL MICRO: ABNORMAL /HPF
NRBC BLD AUTO-RTO: 0 /100 WBCS
PH UR STRIP.AUTO: 5 [PH]
PHOSPHATE SERPL-MCNC: 3.6 MG/DL (ref 2.7–4.5)
PLATELET # BLD AUTO: 190 THOUSANDS/UL (ref 149–390)
PLATELET # BLD AUTO: 202 THOUSANDS/UL (ref 149–390)
PLATELET BLD QL SMEAR: ADEQUATE
PMV BLD AUTO: 9.5 FL (ref 8.9–12.7)
PMV BLD AUTO: 9.9 FL (ref 8.9–12.7)
POTASSIUM SERPL-SCNC: 3.3 MMOL/L (ref 3.5–5.3)
POTASSIUM SERPL-SCNC: 3.4 MMOL/L (ref 3.5–5.3)
PROCALCITONIN SERPL-MCNC: 12.45 NG/ML
PROT UR STRIP-MCNC: ABNORMAL MG/DL
RBC # BLD AUTO: 4.37 MILLION/UL (ref 3.88–5.62)
RBC # BLD AUTO: 4.72 MILLION/UL (ref 3.88–5.62)
RBC #/AREA URNS AUTO: ABNORMAL /HPF
RBC MORPH BLD: NORMAL
SODIUM SERPL-SCNC: 133 MMOL/L (ref 135–147)
SODIUM SERPL-SCNC: 135 MMOL/L (ref 135–147)
SP GR UR STRIP.AUTO: 1.01 (ref 1–1.04)
UROBILINOGEN UA: NEGATIVE MG/DL
WBC # BLD AUTO: 18.3 THOUSAND/UL (ref 4.31–10.16)
WBC # BLD AUTO: 18.5 THOUSAND/UL (ref 4.31–10.16)
WBC #/AREA URNS AUTO: ABNORMAL /HPF
WBC CASTS URNS QL MICRO: ABNORMAL /LPF

## 2025-06-15 PROCEDURE — 76870 US EXAM SCROTUM: CPT

## 2025-06-15 PROCEDURE — 99223 1ST HOSP IP/OBS HIGH 75: CPT | Performed by: FAMILY MEDICINE

## 2025-06-15 PROCEDURE — 87186 SC STD MICRODIL/AGAR DIL: CPT | Performed by: EMERGENCY MEDICINE

## 2025-06-15 PROCEDURE — 96360 HYDRATION IV INFUSION INIT: CPT

## 2025-06-15 PROCEDURE — 99222 1ST HOSP IP/OBS MODERATE 55: CPT | Performed by: UROLOGY

## 2025-06-15 PROCEDURE — 80048 BASIC METABOLIC PNL TOTAL CA: CPT

## 2025-06-15 PROCEDURE — 74177 CT ABD & PELVIS W/CONTRAST: CPT

## 2025-06-15 PROCEDURE — 36415 COLL VENOUS BLD VENIPUNCTURE: CPT | Performed by: EMERGENCY MEDICINE

## 2025-06-15 PROCEDURE — 96375 TX/PRO/DX INJ NEW DRUG ADDON: CPT

## 2025-06-15 PROCEDURE — 96365 THER/PROPH/DIAG IV INF INIT: CPT

## 2025-06-15 PROCEDURE — 96361 HYDRATE IV INFUSION ADD-ON: CPT

## 2025-06-15 PROCEDURE — 87040 BLOOD CULTURE FOR BACTERIA: CPT | Performed by: EMERGENCY MEDICINE

## 2025-06-15 PROCEDURE — 85007 BL SMEAR W/DIFF WBC COUNT: CPT

## 2025-06-15 PROCEDURE — 87154 CUL TYP ID BLD PTHGN 6+ TRGT: CPT | Performed by: EMERGENCY MEDICINE

## 2025-06-15 PROCEDURE — 83605 ASSAY OF LACTIC ACID: CPT | Performed by: EMERGENCY MEDICINE

## 2025-06-15 PROCEDURE — 85027 COMPLETE CBC AUTOMATED: CPT

## 2025-06-15 PROCEDURE — 96366 THER/PROPH/DIAG IV INF ADDON: CPT

## 2025-06-15 RX ORDER — ACETAMINOPHEN 325 MG/1
975 TABLET ORAL EVERY 6 HOURS SCHEDULED
Status: DISCONTINUED | OUTPATIENT
Start: 2025-06-15 | End: 2025-06-19 | Stop reason: HOSPADM

## 2025-06-15 RX ORDER — LEVOFLOXACIN 5 MG/ML
750 INJECTION, SOLUTION INTRAVENOUS ONCE
Status: COMPLETED | OUTPATIENT
Start: 2025-06-15 | End: 2025-06-15

## 2025-06-15 RX ORDER — SODIUM CHLORIDE, SODIUM LACTATE, POTASSIUM CHLORIDE, CALCIUM CHLORIDE 600; 310; 30; 20 MG/100ML; MG/100ML; MG/100ML; MG/100ML
160 INJECTION, SOLUTION INTRAVENOUS CONTINUOUS
Status: DISCONTINUED | OUTPATIENT
Start: 2025-06-15 | End: 2025-06-15

## 2025-06-15 RX ORDER — SODIUM CHLORIDE, SODIUM LACTATE, POTASSIUM CHLORIDE, CALCIUM CHLORIDE 600; 310; 30; 20 MG/100ML; MG/100ML; MG/100ML; MG/100ML
75 INJECTION, SOLUTION INTRAVENOUS CONTINUOUS
Status: DISCONTINUED | OUTPATIENT
Start: 2025-06-15 | End: 2025-06-16

## 2025-06-15 RX ORDER — MORPHINE SULFATE 4 MG/ML
4 INJECTION, SOLUTION INTRAMUSCULAR; INTRAVENOUS ONCE
Status: COMPLETED | OUTPATIENT
Start: 2025-06-15 | End: 2025-06-15

## 2025-06-15 RX ORDER — LEVOFLOXACIN 5 MG/ML
750 INJECTION, SOLUTION INTRAVENOUS DAILY
Status: DISCONTINUED | OUTPATIENT
Start: 2025-06-15 | End: 2025-06-16

## 2025-06-15 RX ORDER — LEVOFLOXACIN 5 MG/ML
750 INJECTION, SOLUTION INTRAVENOUS DAILY
Status: DISCONTINUED | OUTPATIENT
Start: 2025-06-16 | End: 2025-06-15

## 2025-06-15 RX ORDER — LEVOFLOXACIN 5 MG/ML
750 INJECTION, SOLUTION INTRAVENOUS DAILY
Status: DISCONTINUED | OUTPATIENT
Start: 2025-06-15 | End: 2025-06-15

## 2025-06-15 RX ORDER — POTASSIUM CHLORIDE 1500 MG/1
20 TABLET, EXTENDED RELEASE ORAL DAILY
Status: COMPLETED | OUTPATIENT
Start: 2025-06-15 | End: 2025-06-16

## 2025-06-15 RX ADMIN — SODIUM CHLORIDE, SODIUM LACTATE, POTASSIUM CHLORIDE, AND CALCIUM CHLORIDE 160 ML/HR: .6; .31; .03; .02 INJECTION, SOLUTION INTRAVENOUS at 05:10

## 2025-06-15 RX ADMIN — MORPHINE SULFATE 4 MG: 4 INJECTION, SOLUTION INTRAMUSCULAR; INTRAVENOUS at 01:14

## 2025-06-15 RX ADMIN — LEVOFLOXACIN 750 MG: 5 INJECTION, SOLUTION INTRAVENOUS at 22:56

## 2025-06-15 RX ADMIN — ACETAMINOPHEN 975 MG: 325 TABLET, FILM COATED ORAL at 12:00

## 2025-06-15 RX ADMIN — LEVOFLOXACIN 750 MG: 750 INJECTION, SOLUTION INTRAVENOUS at 01:30

## 2025-06-15 RX ADMIN — POTASSIUM CHLORIDE 20 MEQ: 1500 TABLET, EXTENDED RELEASE ORAL at 08:08

## 2025-06-15 RX ADMIN — SODIUM CHLORIDE, SODIUM LACTATE, POTASSIUM CHLORIDE, AND CALCIUM CHLORIDE 100 ML/HR: .6; .31; .03; .02 INJECTION, SOLUTION INTRAVENOUS at 06:00

## 2025-06-15 RX ADMIN — ACETAMINOPHEN 975 MG: 325 TABLET, FILM COATED ORAL at 23:00

## 2025-06-15 RX ADMIN — ACETAMINOPHEN 975 MG: 325 TABLET, FILM COATED ORAL at 17:43

## 2025-06-15 RX ADMIN — ACETAMINOPHEN 975 MG: 325 TABLET, FILM COATED ORAL at 05:38

## 2025-06-15 RX ADMIN — IOHEXOL 85 ML: 350 INJECTION, SOLUTION INTRAVENOUS at 01:42

## 2025-06-15 RX ADMIN — SODIUM CHLORIDE 1000 ML: 0.9 INJECTION, SOLUTION INTRAVENOUS at 01:39

## 2025-06-15 RX ADMIN — SODIUM CHLORIDE, SODIUM LACTATE, POTASSIUM CHLORIDE, AND CALCIUM CHLORIDE 75 ML/HR: .6; .31; .03; .02 INJECTION, SOLUTION INTRAVENOUS at 19:24

## 2025-06-15 NOTE — ASSESSMENT & PLAN NOTE
In congruence with evidence for cystitis on imaging  Positive leukocytes and occult blood on UA  CT abd/ pelvis wt contrast: With circumferential bladder wall thickening    Plan  See AP abscess

## 2025-06-15 NOTE — ASSESSMENT & PLAN NOTE
Tender left testicle  Ultrasound scrotum (6/15): left epididymitis. Associated left scrotal thickening and edema. Mild left varicocele     Plan  See AP abscess
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
## 2025-06-15 NOTE — ASSESSMENT & PLAN NOTE
Home med: lisinopril 40 mg tablet daily  Reports not taking home med post prostatectomy (12/24) due to normal Bps  At goal as of this morning     Plan  Hold lisinopril 40 mg tablet daily

## 2025-06-15 NOTE — PLAN OF CARE
Problem: PAIN - ADULT  Goal: Verbalizes/displays adequate comfort level or baseline comfort level  Description: Interventions:  - Encourage patient to monitor pain and request assistance  - Assess pain using appropriate pain scale  - Administer analgesics as ordered based on type and severity of pain and evaluate response  - Implement non-pharmacological measures as appropriate and evaluate response  - Consider cultural and social influences on pain and pain management  - Notify physician/advanced practitioner if interventions unsuccessful or patient reports new pain  - Educate patient/family on pain management process including their role and importance of  reporting pain   - Provide non-pharmacologic/complimentary pain relief interventions  Outcome: Progressing     Problem: INFECTION - ADULT  Goal: Absence or prevention of progression during hospitalization  Description: INTERVENTIONS:  - Assess and monitor for signs and symptoms of infection  - Monitor lab/diagnostic results  - Monitor all insertion sites, i.e. indwelling lines, tubes, and drains  - Monitor endotracheal if appropriate and nasal secretions for changes in amount and color  - [place] appropriate cooling/warming therapies per order  - Administer medications as ordered  - Instruct and encourage patient and family to use good hand hygiene technique  - Identify and instruct in appropriate isolation precautions for identified infection/condition  Outcome: Progressing     Problem: Knowledge Deficit  Goal: Patient/family/caregiver demonstrates understanding of disease process, treatment plan, medications, and discharge instructions  Description: Complete learning assessment and assess knowledge base.  Interventions:  - Provide teaching at level of understanding  - Provide teaching via preferred learning methods  Outcome: Progressing     Problem: DISCHARGE PLANNING  Goal: Discharge to home or other facility with appropriate resources  Description:  INTERVENTIONS:  - Identify barriers to discharge w/patient and caregiver  - Arrange for needed discharge resources and transportation as appropriate  - Identify discharge learning needs (meds, wound care, etc.)  - Arrange for interpretive services to assist at discharge as needed  - Refer to Case Management Department for coordinating discharge planning if the patient needs post-hospital services based on physician/advanced practitioner order or complex needs related to functional status, cognitive ability, or social support system  Outcome: Progressing

## 2025-06-15 NOTE — ASSESSMENT & PLAN NOTE
For management of BPH, sizable gland and recurrent UTI  Robotic simple prostatectomy December 2020 for  Patient has done well postoperatively until now.    Plan:  Monitor voiding  Outpatient follow-up.

## 2025-06-15 NOTE — ASSESSMENT & PLAN NOTE
3.4 x 2.2 cm focal rim enhancing collection involving the seminal vesicle compatible with abscess.  Patient is afebrile and hemodynamically stable.  Blood positive leukocytosis of 18K.      Plan:  Symptom management  Monitor voiding  On Levaquin per internal medicine--appreciated  Await all cultures and narrow antibiotics  Follow clinical progress.  Most cases are treated with antibiotics alone as first-line however drainage procedures can be crucial for effective treatment and to prevent additional complications and can be considered if no improvement with current therapeutic regimen.  Would allow at least an additional 24 hours of observation and await culture results as well as obtain a.m. labs to follow WBC curve.
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
## 2025-06-15 NOTE — PLAN OF CARE
Problem: PAIN - ADULT  Goal: Verbalizes/displays adequate comfort level or baseline comfort level  Description: Interventions:  - Encourage patient to monitor pain and request assistance  - Assess pain using appropriate pain scale  - Administer analgesics as ordered based on type and severity of pain and evaluate response  - Implement non-pharmacological measures as appropriate and evaluate response  - Consider cultural and social influences on pain and pain management  - Notify physician/advanced practitioner if interventions unsuccessful or patient reports new pain  - Educate patient/family on pain management process including their role and importance of  reporting pain   - Provide non-pharmacologic/complimentary pain relief interventions  Outcome: Progressing     Problem: INFECTION - ADULT  Goal: Absence of fever/infection during neutropenic period  Description: INTERVENTIONS:  - Monitor WBC  - Perform  hand hygiene    Outcome: Progressing     Problem: GENITOURINARY - ADULT  Goal: Maintains or returns to baseline urinary function  Description: INTERVENTIONS:  - Assess urinary function  - Encourage oral fluids to ensure adequate hydration if ordered  - Administer IV fluids as ordered to ensure adequate hydration  - Administer ordered medications as needed  - Offer frequent toileting  - Follow urinary retention protocol if ordered  Outcome: Progressing     Problem: GENITOURINARY - ADULT  Goal: Absence of urinary retention  Description: INTERVENTIONS:  - Assess patient's ability to void and empty bladder  - Monitor I/O  - Bladder scan as needed  - Discuss with physician/AP medications to alleviate retention as needed  - Discuss catheterization for long term situations as appropriate  Outcome: Progressing

## 2025-06-15 NOTE — ASSESSMENT & PLAN NOTE
Met SIRS criteria with HR 99 and wbc 18,500  In the setting of abscess of seminal vesicle, UTI, cystitis and epididymitis of left testicle.  Nontoxic in appearance and hemodynamically stable    SIRS resolved
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
## 2025-06-15 NOTE — PLAN OF CARE
Problem: PAIN - ADULT  Goal: Verbalizes/displays adequate comfort level or baseline comfort level  Description: Interventions:  - Encourage patient to monitor pain and request assistance  - Assess pain using appropriate pain scale  - Administer analgesics as ordered based on type and severity of pain and evaluate response  - Implement non-pharmacological measures as appropriate and evaluate response  - Consider cultural and social influences on pain and pain management  - Notify physician/advanced practitioner if interventions unsuccessful or patient reports new pain  - Educate patient/family on pain management process including their role and importance of  reporting pain   - Provide non-pharmacologic/complimentary pain relief interventions  Outcome: Progressing     Problem: INFECTION - ADULT  Goal: Absence or prevention of progression during hospitalization  Description: INTERVENTIONS:  - Assess and monitor for signs and symptoms of infection  - Monitor lab/diagnostic results  - Monitor all insertion sites, i.e. indwelling lines, tubes, and drains  - Monitor endotracheal if appropriate and nasal secretions for changes in amount and color  - [place] appropriate cooling/warming therapies per order  - Administer medications as ordered  - Instruct and encourage patient and family to use good hand hygiene technique  - Identify and instruct in appropriate isolation precautions for identified infection/condition  Outcome: Progressing

## 2025-06-15 NOTE — ASSESSMENT & PLAN NOTE
S/p prostatectomy 12/9/24, due to BPH  Established with urology as an outpatient    Plan:   6-month follow-up appointment with urology scheduled for 6/24/2025

## 2025-06-15 NOTE — QUICK NOTE
Patient seen at bedside. Reports improvement of symptoms from yesterday, minimal pain and states swelling has gone down. Had mild discomfort while urinating. Otherwise, denies any other concerns. Will continue monitoring, follow up Ucx/Bcx and trend labs in AM. Continue IVF and Levaquin in the meantime.

## 2025-06-15 NOTE — ASSESSMENT & PLAN NOTE
Left testicle over double the size of right testicle  CT abdomen and pelvis witH contrast (6/15/25): seminal vesiculitis with associated 3.4 cm abscess within the left seminal vesicle.   Blood culture w/GNR     Plan  Tylenol 975 mg q6h jessica  Toradol 15 mg IV q6 PRN for two days   Continue  mg levofloxacin x 7 day course (3/7)   Urinary protocol   Scrotal support  Urology consult  AM CBC and BMP  Urine culture pending

## 2025-06-15 NOTE — ASSESSMENT & PLAN NOTE
Lab Results   Component Value Date    SODIUM 139 06/16/2025    K 3.7 06/16/2025     (H) 06/16/2025    CO2 24 06/16/2025    BUN 16 06/16/2025    CREATININE 1.17 06/16/2025    GLUC 108 06/16/2025    CALCIUM 8.5 06/16/2025     Resolved

## 2025-06-16 PROBLEM — E87.1 HYPONATREMIA: Status: RESOLVED | Noted: 2024-12-12 | Resolved: 2025-06-16

## 2025-06-16 PROBLEM — R65.10 SIRS (SYSTEMIC INFLAMMATORY RESPONSE SYNDROME) (HCC): Status: RESOLVED | Noted: 2025-06-15 | Resolved: 2025-06-16

## 2025-06-16 LAB
ANION GAP SERPL CALCULATED.3IONS-SCNC: 5 MMOL/L (ref 4–13)
BASOPHILS # BLD AUTO: 0.04 THOUSANDS/ÂΜL (ref 0–0.1)
BASOPHILS NFR BLD AUTO: 0 % (ref 0–1)
BUN SERPL-MCNC: 16 MG/DL (ref 5–25)
CALCIUM SERPL-MCNC: 8.5 MG/DL (ref 8.4–10.2)
CHLORIDE SERPL-SCNC: 110 MMOL/L (ref 96–108)
CO2 SERPL-SCNC: 24 MMOL/L (ref 21–32)
CREAT SERPL-MCNC: 1.17 MG/DL (ref 0.6–1.3)
EOSINOPHIL # BLD AUTO: 0.14 THOUSAND/ÂΜL (ref 0–0.61)
EOSINOPHIL NFR BLD AUTO: 1 % (ref 0–6)
ERYTHROCYTE [DISTWIDTH] IN BLOOD BY AUTOMATED COUNT: 14.8 % (ref 11.6–15.1)
GFR SERPL CREATININE-BSD FRML MDRD: 69 ML/MIN/1.73SQ M
GLUCOSE SERPL-MCNC: 108 MG/DL (ref 65–140)
HCT VFR BLD AUTO: 35.8 % (ref 36.5–49.3)
HGB BLD-MCNC: 11.6 G/DL (ref 12–17)
IMM GRANULOCYTES # BLD AUTO: 0.18 THOUSAND/UL (ref 0–0.2)
IMM GRANULOCYTES NFR BLD AUTO: 1 % (ref 0–2)
LYMPHOCYTES # BLD AUTO: 2.15 THOUSANDS/ÂΜL (ref 0.6–4.47)
LYMPHOCYTES NFR BLD AUTO: 14 % (ref 14–44)
MCH RBC QN AUTO: 28.6 PG (ref 26.8–34.3)
MCHC RBC AUTO-ENTMCNC: 32.4 G/DL (ref 31.4–37.4)
MCV RBC AUTO: 88 FL (ref 82–98)
MONOCYTES # BLD AUTO: 0.7 THOUSAND/ÂΜL (ref 0.17–1.22)
MONOCYTES NFR BLD AUTO: 5 % (ref 4–12)
NEUTROPHILS # BLD AUTO: 12.29 THOUSANDS/ÂΜL (ref 1.85–7.62)
NEUTS SEG NFR BLD AUTO: 79 % (ref 43–75)
NRBC BLD AUTO-RTO: 0 /100 WBCS
PLATELET # BLD AUTO: 182 THOUSANDS/UL (ref 149–390)
PMV BLD AUTO: 9.9 FL (ref 8.9–12.7)
POTASSIUM SERPL-SCNC: 3.7 MMOL/L (ref 3.5–5.3)
RBC # BLD AUTO: 4.05 MILLION/UL (ref 3.88–5.62)
SODIUM SERPL-SCNC: 139 MMOL/L (ref 135–147)
WBC # BLD AUTO: 15.5 THOUSAND/UL (ref 4.31–10.16)

## 2025-06-16 PROCEDURE — 85025 COMPLETE CBC W/AUTO DIFF WBC: CPT

## 2025-06-16 PROCEDURE — 99232 SBSQ HOSP IP/OBS MODERATE 35: CPT | Performed by: FAMILY MEDICINE

## 2025-06-16 PROCEDURE — 80048 BASIC METABOLIC PNL TOTAL CA: CPT

## 2025-06-16 RX ORDER — KETOROLAC TROMETHAMINE 30 MG/ML
15 INJECTION, SOLUTION INTRAMUSCULAR; INTRAVENOUS EVERY 6 HOURS PRN
Status: DISPENSED | OUTPATIENT
Start: 2025-06-16 | End: 2025-06-18

## 2025-06-16 RX ORDER — LEVOFLOXACIN 5 MG/ML
750 INJECTION, SOLUTION INTRAVENOUS EVERY 24 HOURS
Status: DISCONTINUED | OUTPATIENT
Start: 2025-06-16 | End: 2025-06-16

## 2025-06-16 RX ORDER — LEVOFLOXACIN 5 MG/ML
750 INJECTION, SOLUTION INTRAVENOUS EVERY 24 HOURS
Status: DISCONTINUED | OUTPATIENT
Start: 2025-06-16 | End: 2025-06-17

## 2025-06-16 RX ORDER — LISINOPRIL 20 MG/1
40 TABLET ORAL DAILY
Status: DISCONTINUED | OUTPATIENT
Start: 2025-06-16 | End: 2025-06-19 | Stop reason: HOSPADM

## 2025-06-16 RX ORDER — KETOROLAC TROMETHAMINE 30 MG/ML
15 INJECTION, SOLUTION INTRAMUSCULAR; INTRAVENOUS ONCE
Status: COMPLETED | OUTPATIENT
Start: 2025-06-16 | End: 2025-06-16

## 2025-06-16 RX ORDER — LEVOFLOXACIN 750 MG/1
750 TABLET, FILM COATED ORAL EVERY 24 HOURS
Status: DISCONTINUED | OUTPATIENT
Start: 2025-06-16 | End: 2025-06-16

## 2025-06-16 RX ADMIN — LISINOPRIL 40 MG: 20 TABLET ORAL at 23:39

## 2025-06-16 RX ADMIN — LEVOFLOXACIN 750 MG: 5 INJECTION, SOLUTION INTRAVENOUS at 21:27

## 2025-06-16 RX ADMIN — POTASSIUM CHLORIDE 20 MEQ: 1500 TABLET, EXTENDED RELEASE ORAL at 08:25

## 2025-06-16 RX ADMIN — Medication 3 MG: at 21:27

## 2025-06-16 RX ADMIN — ACETAMINOPHEN 975 MG: 325 TABLET, FILM COATED ORAL at 06:00

## 2025-06-16 RX ADMIN — KETOROLAC TROMETHAMINE 15 MG: 30 INJECTION, SOLUTION INTRAMUSCULAR; INTRAVENOUS at 02:07

## 2025-06-16 RX ADMIN — KETOROLAC TROMETHAMINE 15 MG: 30 INJECTION, SOLUTION INTRAMUSCULAR; INTRAVENOUS at 13:15

## 2025-06-16 RX ADMIN — KETOROLAC TROMETHAMINE 15 MG: 30 INJECTION, SOLUTION INTRAMUSCULAR; INTRAVENOUS at 19:45
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
## 2025-06-16 NOTE — PROGRESS NOTES
Progress Note - Family Medicine   Name: Roby Louis 54 y.o. male I MRN: [id number]  Unit/Bed#: 7T [facility] 713-01 I Date of Admission: 6/14/2025   Date of Service: 6/16/2025 I Hospital Day: 1     Assessment & Plan  Abscess of seminal vesicle  Left testicle over double the size of right testicle  CT abdomen and pelvis witH contrast (6/15/25): seminal vesiculitis with associated 3.4 cm abscess within the left seminal vesicle.   Blood culture w/GNR     Plan  Tylenol 975 mg q6h jessica  Toradol 15 mg IV q6 PRN for two days   Continue  mg levofloxacin x 7 day course (3/7)   Urinary protocol   Scrotal support  Urology consult  AM CBC and BMP  Urine culture pending    SIRS (systemic inflammatory response syndrome) (HCC) (Resolved: 6/16/2025)  Met SIRS criteria with HR 99 and wbc 18,500  In the setting of abscess of seminal vesicle, UTI, cystitis and epididymitis of left testicle.  Nontoxic in appearance and hemodynamically stable    SIRS resolved   UTI symptoms  In congruence with evidence for cystitis on imaging  Positive leukocytes and occult blood on UA  CT abd/ pelvis wt contrast: With circumferential bladder wall thickening    Plan  See AP abscess     Epididymitis  Tender left testicle  Ultrasound scrotum (6/15): left epididymitis. Associated left scrotal thickening and edema. Mild left varicocele     Plan  See AP abscess   H/O prostatectomy  S/p prostatectomy 12/9/24, due to BPH  Established with urology as an outpatient    Plan:   6-month follow-up appointment with urology scheduled for 6/24/2025      Hypertension  Home med: lisinopril 40 mg tablet daily  Reports not taking home med post prostatectomy (12/24) due to normal Bps  At goal as of this morning     Plan  Hold lisinopril 40 mg tablet daily   Hyponatremia (Resolved: 6/16/2025)  Resolved   Hypokalemia  Resolved   Acute kidney injury (HCC) (Resolved: 6/15/2025)  Lab Results   Component Value Date    SODIUM 139 06/16/2025    K 3.7 06/16/2025      (H) 06/16/2025    CO2 24 06/16/2025    BUN 16 06/16/2025    CREATININE 1.17 06/16/2025    GLUC 108 06/16/2025    CALCIUM 8.5 06/16/2025     Resolved         24 Hour Events : Patient was with 10/10 pain of his testicle, given toradol which relieved his pain.   Subjective : Patient was seen at [facility] with care team. Complained of pain and no sleep overnight. Otherwise concerned for the effectiveness of the antibiotic regimen. Without pain during bedside examination however swelling mildly increased. Will follow up with Urology for further considerations. Patient updated regarding the treatment plan moving forward. All questions answered.     Objective :  Temp:  [98.3 °F (36.8 °C)-99.2 °F (37.3 °C)] 98.3 °F (36.8 °C)  HR:  [] 79  BP: (140-163)/() 140/95  Resp:  [16-18] 18  SpO2:  [97 %-99 %] 98 %  O2 Device: None (Room air)    Physical Exam  Vitals and nursing note reviewed.   Constitutional:       General: He is not in acute distress.     Appearance: Normal appearance. He is not ill-appearing.   HENT:      Head: Normocephalic and atraumatic.      Right Ear: External ear normal.      Left Ear: External ear normal.      Nose: Nose normal.     Eyes:      General: No scleral icterus.        Right eye: No discharge.         Left eye: No discharge.      Extraocular Movements: Extraocular movements intact.      Conjunctiva/sclera: Conjunctivae normal.       Cardiovascular:      Rate and Rhythm: Normal rate and regular rhythm.      Pulses: Normal pulses.      Heart sounds: Normal heart sounds. No murmur heard.  Pulmonary:      Effort: Pulmonary effort is normal. No respiratory distress.      Breath sounds: Normal breath sounds. No wheezing or rales.   Abdominal:      General: There is no distension.      Palpations: Abdomen is soft.      Tenderness: There is no abdominal tenderness. There is no guarding.   Genitourinary:     Testes:         Right: Mass, tenderness or swelling not present.         Left: Swelling  present. Mass or tenderness not present.     Musculoskeletal:      Right lower leg: No edema.      Left lower leg: No edema.     Skin:     Capillary Refill: Capillary refill takes less than 2 seconds.     Neurological:      Mental Status: He is alert and oriented to person, place, and time. Mental status is at baseline.     Psychiatric:         Mood and Affect: Mood normal.         Behavior: Behavior normal.         Thought Content: Thought content normal.         Judgment: Judgment normal.         Lab Results: I have reviewed the following results:          VTE Pharmacologic Prophylaxis: Low risk, encourage ambulation

## 2025-06-16 NOTE — CASE MANAGEMENT
Case Management Assessment & Discharge Planning Note    Patient name Roby Barber  Location 7T [facility] 713/7T [facility] 713-01 MRN [id number]  : 1970 Date 2025       Current Admission Date: 2025  Current Admission Diagnosis:Abscess of seminal vesicle   Patient Active Problem List    Diagnosis Date Noted    UTI symptoms 06/15/2025    Abscess of seminal vesicle 06/15/2025    Hypokalemia 06/15/2025    H/O prostatectomy 06/15/2025    Epididymitis 06/15/2025    HTN (hypertension) 2024    Person consulting for explanation of examination or test finding 2024    Urinary retention due to benign prostatic hyperplasia 2024    Elevated PSA 2024    Unintentional weight loss 2024    Tachycardia 2024    Chronic right shoulder pain 2021    Murmur 2021    Hypertension 2021    BPH with obstruction/lower urinary tract symptoms 2021    Chronic right-sided low back pain with right-sided sciatica 2021      LOS (days): 1  Geometric Mean LOS (GMLOS) (days):   Days to GMLOS:     OBJECTIVE:    Risk of Unplanned Readmission Score: 6.18         Current admission status: Inpatient       Preferred Pharmacy:   [id number] - [place], [place] - [address]  [place] [address]  Phone: [phone] Fax: [phone]    Primary Care Provider: Elias Schoen, MD    Primary Insurance: BLUE CROSS  Secondary Insurance:     ASSESSMENT:  Active Health Care Proxies    There are no active Health Care Proxies on file.       Advance Directives  Does patient have a Health Care POA?: No  Was patient offered paperwork?: Yes (Provided with information)  Does patient currently have a Health Care decision maker?: No  Does patient have Advance Directives?: No  Was patient offered paperwork?: Yes (Provided with information)  Primary Contact: raad barber (Daughter)  [phone] (Mobile)    Readmission Root Cause  30 Day Readmission: No    Patient  Information  Admitted from:: Home  Mental Status: Alert  During Assessment patient was accompanied by: Not accompanied during assessment  Assessment information provided by:: Patient  Primary Caregiver: Self  Support Systems: Family members, Self, Daughter  County of Residence: [place]  What city do you live in?: [place]  Home entry access options. Select all that apply.: No steps to enter home  Type of Current Residence: Apartment  Floor Level: 1  Upon entering residence, is there a bedroom on the main floor (no further steps)?: Yes  Upon entering residence, is there a bathroom on the main floor (no further steps)?: Yes  Living Arrangements: Lives w/ Family members  Is patient a ?: No    Activities of Daily Living Prior to Admission  Functional Status: Independent  Completes ADLs independently?: Yes  Ambulates independently?: Yes  Does patient use assisted devices?: No  Does patient currently own DME?: No  Does patient have a history of Outpatient Therapy (PT/OT)?: No  Does the patient have a history of Short-Term Rehab?: No  Does patient have a history of HHC?: No  Does patient currently have HHC?: No         Patient Information Continued  Income Source: Employed  Does patient have prescription coverage?: Yes  Can the patient afford their medications and any related supplies (such as glucometers or test strips)?: N/A  Does patient receive dialysis treatments?: No  Does patient have a history of substance abuse?: No  Does patient have a history of Mental Health Diagnosis?: No    Means of Transportation  Means of Transport to Appts:: Drives Self    DISCHARGE DETAILS:    Discharge planning discussed with:: Patient      CM contacted family/caregiver?: No- see comments (AAOx3)  Were Treatment Team discharge recommendations reviewed with patient/caregiver?: Yes  Did patient/caregiver verbalize understanding of patient care needs?: Yes  Were patient/caregiver advised of the risks associated with not following  Treatment Team discharge recommendations?: Yes    Other Referral/Resources/Interventions Provided:  Interventions: None Indicated    Would you like to participate [facility] program?  : No - Declined    Treatment Team Recommendation: Home  Expected Discharge Disposition: Home or Self Care  Additional Discharge Dispositions: Home or Self Care     Additional Comments: Met with patient at bedside No antipicated discharge needs.  CM department following thru discharge

## 2025-06-16 NOTE — QUICK NOTE
Chart review performed per urology team.  The patient continues urinating independently.  Slight spike in pain reported overnight requiring dose of Toradol.  No other issues reported per nursing staff.  VSS, he remains afebrile.  WBC continues to improve, 15.5 today.  Creatinine remains stable at 1.17.  Blood cultures x1 returning positive for gram-negative rods, official culture/sensitivities pending.  The patient continues to report 6/10 pain within the left groin.  Internal medicine team reports slight increase in swelling/induration within the left testicle on exam today, but the patient reports feeling better overall.  No need for surgical intervention at this time.  Continue empiric antibiotics, await final culture sensitivities.  If WBC begins to rise or the patient develops a fever, recommend reaching out to interventional radiology team to discuss proceeding with transperineal aspiration/drainage of the left seminal vesicle.  Urology will continue to follow peripherally.      Angie Rodriguez PA-C

## 2025-06-16 NOTE — PLAN OF CARE
Problem: PAIN - ADULT  Goal: Verbalizes/displays adequate comfort level or baseline comfort level  Description: Interventions:  - Encourage patient to monitor pain and request assistance  - Assess pain using appropriate pain scale  - Administer analgesics as ordered based on type and severity of pain and evaluate response  - Implement non-pharmacological measures as appropriate and evaluate response  - Consider cultural and social influences on pain and pain management  - Notify physician/advanced practitioner if interventions unsuccessful or patient reports new pain  - Educate patient/family on pain management process including their role and importance of  reporting pain   - Provide non-pharmacologic/complimentary pain relief interventions  Outcome: Progressing     Problem: INFECTION - ADULT  Goal: Absence or prevention of progression during hospitalization  Description: INTERVENTIONS:  - Assess and monitor for signs and symptoms of infection  - Monitor lab/diagnostic results  - Administer medications as ordered  - Instruct and encourage patient and family to use good hand hygiene technique  - Identify and instruct in appropriate isolation precautions for identified infection/condition  Outcome: Progressing

## 2025-06-16 NOTE — QUICK NOTE
Patient seen at bedside. Reports 10/10 pain, toradol administered. Urinating adequately. Otherwise denies any new concerns. VS stable. Will continue IV levofloxacin awaiting culture sensitivities.

## 2025-06-17 LAB
ANION GAP SERPL CALCULATED.3IONS-SCNC: 8 MMOL/L (ref 4–13)
BACTERIA UR CULT: ABNORMAL
BASOPHILS # BLD AUTO: 0.04 THOUSANDS/ÂΜL (ref 0–0.1)
BASOPHILS NFR BLD AUTO: 0 % (ref 0–1)
BUN SERPL-MCNC: 15 MG/DL (ref 5–25)
CALCIUM SERPL-MCNC: 9 MG/DL (ref 8.4–10.2)
CHLORIDE SERPL-SCNC: 105 MMOL/L (ref 96–108)
CO2 SERPL-SCNC: 23 MMOL/L (ref 21–32)
CREAT SERPL-MCNC: 1.16 MG/DL (ref 0.6–1.3)
EOSINOPHIL # BLD AUTO: 0.16 THOUSAND/ÂΜL (ref 0–0.61)
EOSINOPHIL NFR BLD AUTO: 1 % (ref 0–6)
ERYTHROCYTE [DISTWIDTH] IN BLOOD BY AUTOMATED COUNT: 14.9 % (ref 11.6–15.1)
GFR SERPL CREATININE-BSD FRML MDRD: 70 ML/MIN/1.73SQ M
GLUCOSE SERPL-MCNC: 76 MG/DL (ref 65–140)
HCT VFR BLD AUTO: 37.1 % (ref 36.5–49.3)
HGB BLD-MCNC: 11.8 G/DL (ref 12–17)
IMM GRANULOCYTES # BLD AUTO: 0.12 THOUSAND/UL (ref 0–0.2)
IMM GRANULOCYTES NFR BLD AUTO: 1 % (ref 0–2)
LYMPHOCYTES # BLD AUTO: 2.33 THOUSANDS/ÂΜL (ref 0.6–4.47)
LYMPHOCYTES NFR BLD AUTO: 17 % (ref 14–44)
MCH RBC QN AUTO: 28.3 PG (ref 26.8–34.3)
MCHC RBC AUTO-ENTMCNC: 31.8 G/DL (ref 31.4–37.4)
MCV RBC AUTO: 89 FL (ref 82–98)
MONOCYTES # BLD AUTO: 0.79 THOUSAND/ÂΜL (ref 0.17–1.22)
MONOCYTES NFR BLD AUTO: 6 % (ref 4–12)
NEUTROPHILS # BLD AUTO: 10.71 THOUSANDS/ÂΜL (ref 1.85–7.62)
NEUTS SEG NFR BLD AUTO: 75 % (ref 43–75)
NRBC BLD AUTO-RTO: 0 /100 WBCS
PLATELET # BLD AUTO: 236 THOUSANDS/UL (ref 149–390)
PMV BLD AUTO: 10.9 FL (ref 8.9–12.7)
POTASSIUM SERPL-SCNC: 4.4 MMOL/L (ref 3.5–5.3)
RBC # BLD AUTO: 4.17 MILLION/UL (ref 3.88–5.62)
SODIUM SERPL-SCNC: 136 MMOL/L (ref 135–147)
WBC # BLD AUTO: 14.15 THOUSAND/UL (ref 4.31–10.16)

## 2025-06-17 PROCEDURE — 80048 BASIC METABOLIC PNL TOTAL CA: CPT

## 2025-06-17 PROCEDURE — 99232 SBSQ HOSP IP/OBS MODERATE 35: CPT | Performed by: FAMILY MEDICINE

## 2025-06-17 PROCEDURE — 85025 COMPLETE CBC W/AUTO DIFF WBC: CPT

## 2025-06-17 RX ORDER — CEFTRIAXONE 1 G/50ML
1000 INJECTION, SOLUTION INTRAVENOUS EVERY 24 HOURS
Status: DISCONTINUED | OUTPATIENT
Start: 2025-06-17 | End: 2025-06-19

## 2025-06-17 RX ADMIN — LISINOPRIL 40 MG: 20 TABLET ORAL at 08:13

## 2025-06-17 RX ADMIN — KETOROLAC TROMETHAMINE 15 MG: 30 INJECTION, SOLUTION INTRAMUSCULAR; INTRAVENOUS at 08:12

## 2025-06-17 RX ADMIN — KETOROLAC TROMETHAMINE 15 MG: 30 INJECTION, SOLUTION INTRAMUSCULAR; INTRAVENOUS at 14:03

## 2025-06-17 RX ADMIN — KETOROLAC TROMETHAMINE 15 MG: 30 INJECTION, SOLUTION INTRAMUSCULAR; INTRAVENOUS at 19:38

## 2025-06-17 RX ADMIN — Medication 2.5 MG: at 00:45

## 2025-06-17 RX ADMIN — KETOROLAC TROMETHAMINE 15 MG: 30 INJECTION, SOLUTION INTRAMUSCULAR; INTRAVENOUS at 01:51

## 2025-06-17 RX ADMIN — Medication 3 MG: at 21:04

## 2025-06-17 RX ADMIN — CEFTRIAXONE 1000 MG: 1 INJECTION, SOLUTION INTRAVENOUS at 21:04

## 2025-06-17 NOTE — TREATMENT PLAN
"Chart review performed per urology team.  The patient continues urinating independently.  Vitals remained stable, he is afebrile.  WBC continues to improve, 14.15 today.  Urine culture returning with only 20,000-29,000 CFU E. coli.  Blood culture preliminary results returning for gram-negative rods.  Blood culture #2 reveals no growth at 24 hours.  The patient reported worsening pain overnight requiring both Toradol and oxycodone.  Today on exam, internal medicine reports increased induration to the area.  Internal medicine reached out to interventional radiology team to discuss the recommendations moving forward -- \"we generally drain pelvic related abscesses via transgluteal and in this case, the abscess is so small and higher in the pelvis, approaching this would be difficult.\"  Imaging and case discussed with on-call urology surgeon, Dr. Loco --he reports there is unfortunately no form of drainage from urologic standpoint available.  Moving forward we recommend transgluteal drainage via IR but as this poses difficulty proceeding with observation and antibiotics as the patient's labs and vitals remain overall stable is appropriate.  Urology will continue to follow peripherally.    Angie Rodriguez PA-C  "

## 2025-06-17 NOTE — TELEMEDICINE
e-Consult (IPC)  - Interventional Radiology  Roby Louis 54 y.o. male MRN: [id number]  Unit/Bed#: 7T [facility] 713-01 Encounter: [id number]          Interventional Radiology has been consulted to evaluate Roby Louis    We were consulted by Dr. Velazco concerning this patient with seminal vesicle abscess.    Inpatient Consult to IR  Consult performed by: Osmin Canela DO  Consult ordered by: Ronel Velazco MD        06/17/25    Assessment/Recommendation:   54 y M w/ h/o prostatectomy 12/9/24 a/w leukocytosis meeting SIRS criteria now on Abx. US findings c/f L epididymitis with scrotal thickening. CT showed 3.4 cm left seminal vesicle abscess. WBC down to 14 from 18. Afeb, vitals stable. Reported worsening scrotal pain / induration with 1 of 2 blood cultures positive for E. Coli. 2nd is negative. Urology involved, suggested IR consideration for transperineal drainage.    Generally speaking, IR does not perform transperineal / rectal drainage procedures. Additionally, the left seminal vesical abscess is small and he just initiated Abx. We do offer transgluteal approach drainage for pelvic abscesses, but this abscess is higher in the pelvis raising the risk of increased pain noting pyriformis transgression, higher risk of sciatic nerve injury and pelvic arterial injury. Additionally, the size of the abscess is marginally big enough to place a drain.     Recommend continued medical management with Abx. If there is concern for worsening cellulitis / skin thickening / gangrene (Citlalli's), consider early general surgical evaluation.     11-20 minutes, >50% of the total time devoted to medical consultative verbal/EMR discussion between providers. Written report will be generated in the EMR.     Thank you for allowing Interventional Radiology to participate in the care of Roby Louis. Please don't hesitate to call or TigerText us with any questions.     Osmin Canela,  DO

## 2025-06-17 NOTE — ASSESSMENT & PLAN NOTE
BP Readings from Last 3 Encounters:   06/17/25 158/99   12/30/24 136/80   12/23/24 140/90      Home med: lisinopril 40 mg tablet daily  Reports not taking home med post prostatectomy (12/24) due to normal Bps    Plan  start lisinopril 40 mg tablet daily

## 2025-06-17 NOTE — PROGRESS NOTES
Progress Note - Family Medicine   Name: Roby Louis 54 y.o. male I MRN: [id number]  Unit/Bed#: 7T [facility] 713-01 I Date of Admission: 6/14/2025   Date of Service: 6/17/2025 I Hospital Day: 2     Assessment & Plan  Abscess of seminal vesicle  Left testicle over double the size of right testicle  CT abdomen and pelvis witH contrast (6/15/25): seminal vesiculitis with associated 3.4 cm abscess within the left seminal vesicle.   Blood culture  #1 is negative 24 hr, #2 is positive for E. Coli  Urine culture is 20-29K Gr negative rods  WBC 18.3> 15.5> 14.15  IR consulted: no drainage was recommended.   Plan  Tylenol 975 mg q6h jessica  Toradol 15 mg IV q6 PRN   Continue  mg levofloxacin x 7 day course (4/7)   Urinary protocol   Scrotal support  Urology consult- if febrile or WBC trending up then reach out   AM CBC and BMP      UTI symptoms  In congruence with evidence for cystitis on imaging  Positive leukocytes and occult blood on UA  CT abd/ pelvis wt contrast: With circumferential bladder wall thickening    Plan  See AP abscess     Epididymitis  Tender left testicle  Ultrasound scrotum (6/15): left epididymitis. Associated left scrotal thickening and edema. Mild left varicocele     Plan  See AP abscess   H/O prostatectomy  S/p prostatectomy 12/9/24, due to BPH  Established with urology as an outpatient    Plan:   6-month follow-up appointment with urology scheduled for 6/24/2025      Hypertension  BP Readings from Last 3 Encounters:   06/17/25 158/99   12/30/24 136/80   12/23/24 140/90      Home med: lisinopril 40 mg tablet daily  Reports not taking home med post prostatectomy (12/24) due to normal Bps    Plan  start lisinopril 40 mg tablet daily   Hypokalemia  Resolved     24 Hour Events : Overnight patient complained of significant pain in his left scrotum that required toradol IV as well as one time dose of oxycodone. Patient continued afebrile but did have elevated BP which could have been associated with  pain however given that systolic > 160 he was restarted on lisinopril 40 mg.   Subjective : Patient seen and examined at bedside during pre rounds, he is laying down comfortably, continues to complain of pain,remains afebrile, WBC count is trending down. No difficulty with urination.      Objective :  Temp:  [98.4 °F (36.9 °C)-99.4 °F (37.4 °C)] 98.8 °F (37.1 °C)  HR:  [81-96] 81  BP: (147-173)/() 158/99  Resp:  [16-20] 20  SpO2:  [96 %-98 %] 97 %  O2 Device: None (Room air)    Physical Exam  Vitals and nursing note reviewed.   Constitutional:       General: He is not in acute distress.     Appearance: He is well-developed.   HENT:      Head: Normocephalic and atraumatic.     Eyes:      Conjunctiva/sclera: Conjunctivae normal.       Cardiovascular:      Rate and Rhythm: Normal rate and regular rhythm.      Heart sounds: No murmur heard.  Pulmonary:      Effort: Pulmonary effort is normal. No respiratory distress.      Breath sounds: Normal breath sounds.   Abdominal:      Palpations: Abdomen is soft.      Tenderness: There is no abdominal tenderness.     Musculoskeletal:         General: No swelling.      Cervical back: Neck supple.      Right lower leg: No edema.      Left lower leg: No edema.     Skin:     General: Skin is warm and dry.      Comments: Induration on left scrotum that is unchanged from yesterday      Neurological:      Mental Status: He is alert.     Psychiatric:         Behavior: Behavior normal.         Lab Results: I have reviewed the following results:CBC/BMP:   .     06/17/25  0447   WBC 14.15*   HGB 11.8*   HCT 37.1      SODIUM 136   K 4.4      CO2 23   BUN 15   CREATININE 1.16   GLUC 76        Imaging Results Review: No pertinent imaging studies reviewed.  Other Study Results Review: No additional pertinent studies reviewed.    VTE Pharmacologic Prophylaxis: Reason for no pharmacologic prophylaxis Low VTE score, encourage ambulation   VTE Mechanical Prophylaxis: sequential  compression device

## 2025-06-17 NOTE — ASSESSMENT & PLAN NOTE
Left testicle over double the size of right testicle  CT abdomen and pelvis witH contrast (6/15/25): seminal vesiculitis with associated 3.4 cm abscess within the left seminal vesicle.   Blood culture  #1 is negative 24 hr, #2 is positive for E. Coli  Urine culture is 20-29K Gr negative rods  WBC 18.3> 15.5> 14.15  IR consulted: no drainage was recommended.   Plan  Tylenol 975 mg q6h jessica  Toradol 15 mg IV q6 PRN   Continue  mg levofloxacin x 7 day course (4/7)   Urinary protocol   Scrotal support  Urology consult- if febrile or WBC trending up then reach out   AM CBC and BMP

## 2025-06-18 PROBLEM — E87.6 HYPOKALEMIA: Status: RESOLVED | Noted: 2025-06-15 | Resolved: 2025-06-18

## 2025-06-18 LAB
ALBUMIN SERPL BCG-MCNC: 3.5 G/DL (ref 3.5–5)
ALP SERPL-CCNC: 135 U/L (ref 34–104)
ALT SERPL W P-5'-P-CCNC: 49 U/L (ref 7–52)
ANION GAP SERPL CALCULATED.3IONS-SCNC: 9 MMOL/L (ref 4–13)
AST SERPL W P-5'-P-CCNC: 22 U/L (ref 13–39)
BACTERIA BLD CULT: ABNORMAL
BASOPHILS # BLD AUTO: 0.05 THOUSANDS/ÂΜL (ref 0–0.1)
BASOPHILS NFR BLD AUTO: 0 % (ref 0–1)
BILIRUB SERPL-MCNC: 0.45 MG/DL (ref 0.2–1)
BUN SERPL-MCNC: 21 MG/DL (ref 5–25)
CALCIUM SERPL-MCNC: 9.2 MG/DL (ref 8.4–10.2)
CHLORIDE SERPL-SCNC: 105 MMOL/L (ref 96–108)
CO2 SERPL-SCNC: 23 MMOL/L (ref 21–32)
CREAT SERPL-MCNC: 1.1 MG/DL (ref 0.6–1.3)
E COLI DNA BLD POS QL NAA+NON-PROBE: DETECTED
EOSINOPHIL # BLD AUTO: 0.32 THOUSAND/ÂΜL (ref 0–0.61)
EOSINOPHIL NFR BLD AUTO: 3 % (ref 0–6)
ERYTHROCYTE [DISTWIDTH] IN BLOOD BY AUTOMATED COUNT: 14.6 % (ref 11.6–15.1)
GFR SERPL CREATININE-BSD FRML MDRD: 75 ML/MIN/1.73SQ M
GLUCOSE SERPL-MCNC: 78 MG/DL (ref 65–140)
GRAM STN SPEC: ABNORMAL
HCT VFR BLD AUTO: 38.5 % (ref 36.5–49.3)
HGB BLD-MCNC: 12.7 G/DL (ref 12–17)
IMM GRANULOCYTES # BLD AUTO: 0.18 THOUSAND/UL (ref 0–0.2)
IMM GRANULOCYTES NFR BLD AUTO: 2 % (ref 0–2)
LYMPHOCYTES # BLD AUTO: 2.48 THOUSANDS/ÂΜL (ref 0.6–4.47)
LYMPHOCYTES NFR BLD AUTO: 21 % (ref 14–44)
MCH RBC QN AUTO: 28.5 PG (ref 26.8–34.3)
MCHC RBC AUTO-ENTMCNC: 33 G/DL (ref 31.4–37.4)
MCV RBC AUTO: 87 FL (ref 82–98)
MONOCYTES # BLD AUTO: 1 THOUSAND/ÂΜL (ref 0.17–1.22)
MONOCYTES NFR BLD AUTO: 8 % (ref 4–12)
NEUTROPHILS # BLD AUTO: 8.04 THOUSANDS/ÂΜL (ref 1.85–7.62)
NEUTS SEG NFR BLD AUTO: 66 % (ref 43–75)
NRBC BLD AUTO-RTO: 0 /100 WBCS
PLATELET # BLD AUTO: 264 THOUSANDS/UL (ref 149–390)
PMV BLD AUTO: 9.6 FL (ref 8.9–12.7)
POTASSIUM SERPL-SCNC: 4.1 MMOL/L (ref 3.5–5.3)
PROT SERPL-MCNC: 7.5 G/DL (ref 6.4–8.4)
RBC # BLD AUTO: 4.45 MILLION/UL (ref 3.88–5.62)
SODIUM SERPL-SCNC: 137 MMOL/L (ref 135–147)
WBC # BLD AUTO: 12.07 THOUSAND/UL (ref 4.31–10.16)

## 2025-06-18 PROCEDURE — 99232 SBSQ HOSP IP/OBS MODERATE 35: CPT | Performed by: FAMILY MEDICINE

## 2025-06-18 PROCEDURE — 80053 COMPREHEN METABOLIC PANEL: CPT

## 2025-06-18 PROCEDURE — 85025 COMPLETE CBC W/AUTO DIFF WBC: CPT

## 2025-06-18 PROCEDURE — 99238 HOSP IP/OBS DSCHRG MGMT 30/<: CPT | Performed by: FAMILY MEDICINE

## 2025-06-18 RX ORDER — KETOROLAC TROMETHAMINE 30 MG/ML
15 INJECTION, SOLUTION INTRAMUSCULAR; INTRAVENOUS EVERY 6 HOURS PRN
Status: DISCONTINUED | OUTPATIENT
Start: 2025-06-18 | End: 2025-06-19 | Stop reason: HOSPADM

## 2025-06-18 RX ADMIN — KETOROLAC TROMETHAMINE 15 MG: 30 INJECTION, SOLUTION INTRAMUSCULAR at 19:30

## 2025-06-18 RX ADMIN — LISINOPRIL 40 MG: 20 TABLET ORAL at 08:45

## 2025-06-18 RX ADMIN — KETOROLAC TROMETHAMINE 15 MG: 30 INJECTION, SOLUTION INTRAMUSCULAR; INTRAVENOUS at 01:05

## 2025-06-18 RX ADMIN — ACETAMINOPHEN 975 MG: 325 TABLET, FILM COATED ORAL at 23:20

## 2025-06-18 RX ADMIN — CEFTRIAXONE 1000 MG: 1 INJECTION, SOLUTION INTRAVENOUS at 21:47

## 2025-06-18 RX ADMIN — ACETAMINOPHEN 975 MG: 325 TABLET, FILM COATED ORAL at 11:30

## 2025-06-18 RX ADMIN — KETOROLAC TROMETHAMINE 15 MG: 30 INJECTION, SOLUTION INTRAMUSCULAR at 08:56

## 2025-06-18 RX ADMIN — Medication 3 MG: at 21:47

## 2025-06-18 RX ADMIN — ACETAMINOPHEN 975 MG: 325 TABLET, FILM COATED ORAL at 17:44

## 2025-06-18 NOTE — DISCHARGE SUMMARY
Discharge Summary - [facility]    Patient Information: Roby Louis 54 y.o. male MRN: [id number]  Unit/Bed#: 7T [facility] 713-01 Encounter: [id number]    Discharging Physician / Practitioner: Andria Roman MD  PCP: Elias Schoen, MD  Admission Date:   Admission Orders (From admission, onward)       Ordered        06/15/25 0353  INPATIENT ADMISSION  Once                          Discharge Date: 6/19/25    Reason for Admission: Requiring IV antibiotic for seminal vesiculitis     Discharge Diagnoses:     Principal Problem:    Abscess of seminal vesicle  Active Problems:    Hypertension    H/O prostatectomy    Epididymitis  Resolved Problems:    BPH with obstruction/lower urinary tract symptoms    Hyponatremia    Acute kidney injury (HCC)    UTI symptoms    Hypokalemia    SIRS (systemic inflammatory response syndrome) (HCC)        Epididymitis  Assessment & Plan  Tender left testicle  Ultrasound scrotum (6/15): left epididymitis. Associated left scrotal thickening and edema. Mild left varicocele     - urology outpatient     Hypertension  Assessment & Plan  BP Readings from Last 3 Encounters:   06/19/25 150/90   12/30/24 136/80   12/23/24 140/90      Home med: lisinopril 40 mg tablet daily  Reports not taking home med post prostatectomy (12/24) due to normal Bps    Plan  Continue lisinopril 40 mg tablet daily   Follow up with PCP to adjust BP medications     * Abscess of seminal vesicle  Assessment & Plan  Left testicle over double the size of right testicle  CT abdomen and pelvis witH contrast (6/15/25): seminal vesiculitis with associated 3.4 cm abscess within the left seminal vesicle.   Blood culture  #1 is negative 24 hr, #2 is positive for E. Coli  Urine culture is 20-29K Gr negative rods  WBC 18.3> 15.5> 14.15 > 12K  IR consulted: no drainage was recommended.     Plan    Start cefpodoxime BID , first does given in the hospital day of discharge ( 1/7)   Encourage hydration  Pain control with  tylenol 1000mg Q 6HR and Oxycodone for severe and breakthrough pain 2.5 mg Q 6Hr   Follow up with urology                Consultations During Hospital Stay:  CM  Urology  IR    Procedures Performed:   none    Significant Findings / Test Results:   Wbc: 18.5 >15.5> 14.15 > 12   ALP  135 >   Proca.45  Sodium: 133 > 139  Potassium: 3.3 > 3.7  Creatinine: 1.62 > 1.17, baseline 1.0  UA: leukocytes and occult blood  BC #2 : GNR E Coli   Ucx: 20-29K CFU GNR   Lactic acid, Mg, phos: wnl  CT abdomen with contrast: seminal vesiculitis with associated 3.4 cm abscess within the left seminal vesicle. Also suspicious for concomitant cystitis and ascending urinary tract infection.   Ultrasound scrotum: Left epididymitis. Associated left scrotal thickening and edema. Mild left varicocele.Mildly enlarged, hyperemic right epididymal tail.    Incidental Findings:   none     Test Results Pending at Discharge (will require follow up):   none     Outpatient Tests Requested:  none    Outpatient follow-up Requested:  PCP  urology    Complications:  none    Hospital Course:     Roby Louis is a 54 y.o. male with PMH HTN and s/p prostatectomy (2024) due to BPH who originally presented to the hospital on 2025 due to acute onset left sided swelling of testes. admit to [facility] hospital service for requiring IV antibiotics for further management of seminal vesiculitis with associated abscess shown on CT abd/pelvis.     Urology was consulted and antibiotic treatment with levaquin and hydration was recommended with close monitoring. IR consulted 2nd day of admission due to concerns of induration showing minimal improvement- no surgical intervention /drainage was recommended by them, urology agreed with IR plan. He was initially started on levofloxacin for 3 doses and then switched to ceftriaxone due to sensitivities.1/2 blood culture and urine culture grew gram negative rods resistant to levofloxacin. WBC trending down during  "hospitalization. And induration improving.  Pain control achieved with tylenol, toradol Q6 HR PRN and one time dose of oxycodone.    BP during hospitalization was elevated > 140/90, likely due to pain however given past hx of lisinopril use for BP control patient was restarting on previous lisinopril dose at 40 mg. Patient to follow up with PCP and discuss BP medication.     On the day of discharge patient reports improvement of pain and symptoms. He states being pain free during morning rounds, remains afebrile and is well appearing. Patient Is agreeable to go home with close follow up, education on antibiotics and pain management at home as well as ED precautions given. Urology updated.       Review of Systems   Constitutional:  Negative for chills, fatigue and fever.   HENT:  Negative for ear pain and sore throat.    Eyes:  Negative for pain and visual disturbance.   Respiratory:  Negative for cough, shortness of breath and wheezing.    Cardiovascular:  Negative for chest pain and palpitations.   Gastrointestinal:  Negative for abdominal pain, diarrhea, nausea and vomiting.   Genitourinary:  Positive for scrotal swelling and testicular pain (on palpation). Negative for difficulty urinating, dysuria, flank pain, frequency, hematuria, penile pain, penile swelling and urgency.   Musculoskeletal:  Negative for arthralgias and back pain.   Skin:  Negative for color change and rash.   Neurological:  Negative for dizziness, seizures, syncope, weakness and light-headedness.   All other systems reviewed and are negative.      Condition at Discharge: stable     Discharge Day Visit / Exam:     Vitals: Blood Pressure: 150/90 (06/19/25 0900)  Pulse: 84 (06/19/25 0739)  Temperature: 97.9 °F (36.6 °C) (06/19/25 0739)  Temp Source: Temporal (06/19/25 0739)  Respirations: 20 (06/19/25 0739)  Height: 5' 8\" (172.7 cm) (06/15/25 0516)  Weight - Scale: 66.2 kg (146 lb) (06/19/25 0536)  SpO2: 97 % (06/19/25 0739)  Exam:   Physical " Exam  Constitutional:       Appearance: Normal appearance.   HENT:      Head: Normocephalic and atraumatic.      Right Ear: External ear normal.      Left Ear: External ear normal.      Nose: Nose normal.      Mouth/Throat:      Mouth: Mucous membranes are moist.     Eyes:      General: No scleral icterus.      Cardiovascular:      Rate and Rhythm: Normal rate and regular rhythm.      Pulses: Normal pulses.      Heart sounds: Normal heart sounds. No murmur heard.     No friction rub. No gallop.   Pulmonary:      Effort: Pulmonary effort is normal. No respiratory distress.      Breath sounds: Normal breath sounds. No wheezing.   Abdominal:      General: Bowel sounds are normal. There is no distension.      Palpations: Abdomen is soft.      Tenderness: There is no abdominal tenderness.      Comments: Well healed surgical scar present above umbilicus   Genitourinary:     Comments: Left scrotum induration still present, improved from day before.     Musculoskeletal:         General: Normal range of motion.      Cervical back: Normal range of motion and neck supple.     Skin:     Capillary Refill: Capillary refill takes less than 2 seconds.      Findings: No rash.     Neurological:      General: No focal deficit present.      Mental Status: He is alert and oriented to person, place, and time. Mental status is at baseline.         Discussion with Family: No  Patient Information Sharing: Roby Louis does not wish inpatient team to notify their loved ones of their condition and current plan.     Discharge instructions/Information to patient and family:   See after visit summary for information provided to patient and family.      Discharge Medications:    Acetaminophen 1,000 mg Oral Every 6 hours PRN    Cefpodoxime Proxetil 200 mg Oral 2 times daily    Lisinopril 40 mg Oral Daily  Patient not taking: Reported on 6/15/2025    Sennosides 8.6 mg Oral Daily  Patient not taking: Reported on 12/30/202    Provisions for  Follow-Up Care:  See after visit summary for information related to follow-up care and any pertinent home health orders.      Disposition:     Home    For Discharges to [facility] SN[facility]:   Not Applicable to this Patient - Not Applicable to this Patient    Planned Readmission: no     Discharge Statement:  I spent 30 minutes discharging the patient. This time was spent on the day of discharge. I had direct contact with the patient on the day of discharge. Greater than 50% of the total time was spent examining patient, answering all patient questions, arranging and discussing plan of care with patient as well as directly providing post-discharge instructions.  Additional time then spent on discharge activities.    Ronel Velazco MD  06/19/25  11:15 AM

## 2025-06-18 NOTE — TREATMENT PLAN
Chart review performed per urology team. VSS, afebrile. Labs stable, WBC downtrending, now 12.07. Blood culture #2 positive for E.coli. Continue ceftriaxone. Per family medicine evaluation, patient's pain has been improving and plan for discharge tomorrow. Urology will continue to monitor peripherally.       TONY Godoy

## 2025-06-18 NOTE — ASSESSMENT & PLAN NOTE
BP Readings from Last 3 Encounters:   06/18/25 (!) 157/103   12/30/24 136/80   12/23/24 140/90      Home med: lisinopril 40 mg tablet daily  Reports not taking home med post prostatectomy (12/24) due to normal Bps    Plan  Continue lisinopril 40 mg tablet daily

## 2025-06-18 NOTE — ASSESSMENT & PLAN NOTE
Left testicle over double the size of right testicle  CT abdomen and pelvis witH contrast (6/15/25): seminal vesiculitis with associated 3.4 cm abscess within the left seminal vesicle.   Blood culture  #1 is negative 24 hr, #2 is positive for E. Coli  Urine culture is 20-29K Gr negative rods  WBC 18.3> 15.5> 14.15 > 12K  IR consulted: no drainage was recommended.     Plan  Continue Tylenol 975 mg q6h jessica  Continue Toradol 15 mg IV q6 PRN   Continue IV Ceftriaxone (4/7)  Urinary protocol   Scrotal support  Urology consult- if febrile or WBC trending up then reach out   AM CBC and BMP

## 2025-06-18 NOTE — PLAN OF CARE
Problem: PAIN - ADULT  Goal: Verbalizes/displays adequate comfort level or baseline comfort level  Description: Interventions:  - Encourage patient to monitor pain and request assistance  - Assess pain using appropriate pain scale  - Administer analgesics as ordered based on type and severity of pain and evaluate response  - Implement non-pharmacological measures as appropriate and evaluate response  - Consider cultural and social influences on pain and pain management  - Notify physician/advanced practitioner if interventions unsuccessful or patient reports new pain  - Educate patient/family on pain management process including their role and importance of  reporting pain   - Provide non-pharmacologic/complimentary pain relief interventions  6/18/2025 1250 by Shilpa Vang  Outcome: Progressing     Problem: INFECTION - ADULT  Goal: Absence or prevention of progression during hospitalization  Description: INTERVENTIONS:  - Assess and monitor for signs and symptoms of infection  - Monitor lab/diagnostic results  - Administer medications as ordered  - Instruct and encourage patient and family to use good hand hygiene technique  - Identify and instruct in appropriate isolation precautions for identified infection/condition  6/18/2025 1250 by Shilpa Vang  Outcome: Progressing     Problem: SAFETY ADULT  Goal: Patient will remain free of falls  Description: INTERVENTIONS:  - Educate patient/family on patient safety including physical limitations  - Instruct patient to call for assistance with activity   - Consider consulting OT/PT to assist with strengthening/mobility based on AM PAC & JH-HLM score  - Consult OT/PT to assist with strengthening/mobility   - Keep Call bell within reach  - Keep bed low and locked with side rails adjusted as appropriate  - Keep care items and personal belongings within reach  - Initiate and maintain comfort rounds  - Make Fall Risk Sign visible to staff  - Apply yellow  socks and bracelet for high fall risk patients  - Consider moving patient to room near nurses station  6/18/2025 1250 by Shilpa Vang  Outcome: Progressing

## 2025-06-18 NOTE — PLAN OF CARE
Problem: PAIN - ADULT  Goal: Verbalizes/displays adequate comfort level or baseline comfort level  Description: Interventions:  - Encourage patient to monitor pain and request assistance  - Assess pain using appropriate pain scale  - Administer analgesics as ordered based on type and severity of pain and evaluate response  - Implement non-pharmacological measures as appropriate and evaluate response  - Consider cultural and social influences on pain and pain management  - Notify physician/advanced practitioner if interventions unsuccessful or patient reports new pain  - Educate patient/family on pain management process including their role and importance of  reporting pain   - Provide non-pharmacologic/complimentary pain relief interventions  Outcome: Progressing     Problem: INFECTION - ADULT  Goal: Absence or prevention of progression during hospitalization  Description: INTERVENTIONS:  - Assess and monitor for signs and symptoms of infection  - Monitor lab/diagnostic results  - Administer medications as ordered  - Instruct and encourage patient and family to use good hand hygiene technique  - Identify and instruct in appropriate isolation precautions for identified infection/condition  Outcome: Progressing     Problem: GENITOURINARY - ADULT  Goal: Maintains or returns to baseline urinary function  Description: INTERVENTIONS:  - Assess urinary function  - Encourage oral fluids to ensure adequate hydration if ordered  - Administer IV fluids as ordered to ensure adequate hydration  - Administer ordered medications as needed  - Offer frequent toileting  - Follow urinary retention protocol if ordered  Outcome: Progressing

## 2025-06-18 NOTE — PROGRESS NOTES
Progress Note - Family Medicine   Name: Roby Louis 54 y.o. male I MRN: [id number]  Unit/Bed#: 7T [facility] 713-01 I Date of Admission: 6/14/2025   Date of Service: 6/18/2025 I Hospital Day: 3     Assessment & Plan  Abscess of seminal vesicle  Left testicle over double the size of right testicle  CT abdomen and pelvis witH contrast (6/15/25): seminal vesiculitis with associated 3.4 cm abscess within the left seminal vesicle.   Blood culture  #1 is negative 24 hr, #2 is positive for E. Coli  Urine culture is 20-29K Gr negative rods  WBC 18.3> 15.5> 14.15 > 12K  IR consulted: no drainage was recommended.     Plan  Continue Tylenol 975 mg q6h jessica  Continue Toradol 15 mg IV q6 PRN   Continue IV Ceftriaxone (4/7)  Urinary protocol   Scrotal support  Urology consult- if febrile or WBC trending up then reach out   AM CBC and BMP      UTI symptoms  In congruence with evidence for cystitis on imaging  Positive leukocytes and occult blood on UA  CT abd/ pelvis wt contrast: With circumferential bladder wall thickening    Plan  See AP abscess     Epididymitis  Tender left testicle  Ultrasound scrotum (6/15): left epididymitis. Associated left scrotal thickening and edema. Mild left varicocele     Plan  See AP abscess   H/O prostatectomy  S/p prostatectomy 12/9/24, due to BPH  Established with urology as an outpatient    Plan:   6-month follow-up appointment with urology scheduled for 6/24/2025      Hypertension  BP Readings from Last 3 Encounters:   06/18/25 (!) 157/103   12/30/24 136/80   12/23/24 140/90      Home med: lisinopril 40 mg tablet daily  Reports not taking home med post prostatectomy (12/24) due to normal Bps    Plan  Continue lisinopril 40 mg tablet daily   Hypokalemia (Resolved: 6/18/2025)  Resolved     24 Hour Events : Patient with pain overnight requiring toradol.   Subjective : Patient seen at bedside with care team. Endorses overall improvement of pain. Amenable to further day of IV antibiotics and  trending CBC with plans for early discharge tomorrow. All questions answered.     Objective :  Temp:  [97.8 °F (36.6 °C)-98.8 °F (37.1 °C)] 97.8 °F (36.6 °C)  HR:  [75-85] 85  BP: (157-164)/() 157/103  Resp:  [18-20] 18  SpO2:  [96 %-98 %] 97 %  O2 Device: None (Room air)    Physical Exam  Vitals and nursing note reviewed.   Constitutional:       General: He is not in acute distress.     Appearance: He is not ill-appearing.   HENT:      Right Ear: External ear normal.      Left Ear: External ear normal.      Nose: Nose normal.      Mouth/Throat:      Mouth: Mucous membranes are moist.     Eyes:      General: No scleral icterus.        Right eye: No discharge.         Left eye: No discharge.      Extraocular Movements: Extraocular movements intact.      Conjunctiva/sclera: Conjunctivae normal.       Cardiovascular:      Rate and Rhythm: Normal rate and regular rhythm.      Pulses: Normal pulses.      Heart sounds: Normal heart sounds. No murmur heard.  Pulmonary:      Effort: Pulmonary effort is normal. No respiratory distress.      Breath sounds: Normal breath sounds. No wheezing or rales.   Chest:      Chest wall: No tenderness.   Abdominal:      General: There is no distension.      Palpations: Abdomen is soft.      Tenderness: There is no abdominal tenderness. There is no guarding or rebound.   Genitourinary:     Testes:         Left: Tenderness and swelling present.     Musculoskeletal:      Right lower leg: No edema.      Left lower leg: No edema.     Skin:     Capillary Refill: Capillary refill takes less than 2 seconds.     Neurological:      Mental Status: He is alert and oriented to person, place, and time.     Psychiatric:         Mood and Affect: Mood normal.         Behavior: Behavior normal.         Thought Content: Thought content normal.         Judgment: Judgment normal.         Lab Results: I have reviewed the following results:          VTE Pharmacologic Prophylaxis: Enoxaparin (Lovenox)  VTE  Mechanical Prophylaxis: sequential compression device

## 2025-06-19 VITALS
BODY MASS INDEX: 22.13 KG/M2 | OXYGEN SATURATION: 97 % | HEART RATE: 84 BPM | TEMPERATURE: 97.9 F | HEIGHT: 68 IN | WEIGHT: 146 LBS | RESPIRATION RATE: 20 BRPM | SYSTOLIC BLOOD PRESSURE: 150 MMHG | DIASTOLIC BLOOD PRESSURE: 90 MMHG

## 2025-06-19 PROBLEM — R39.9 UTI SYMPTOMS: Status: RESOLVED | Noted: 2025-06-15 | Resolved: 2025-06-19

## 2025-06-19 PROBLEM — N13.8 BPH WITH OBSTRUCTION/LOWER URINARY TRACT SYMPTOMS: Status: RESOLVED | Noted: 2021-03-29 | Resolved: 2025-06-19

## 2025-06-19 PROBLEM — N40.1 BPH WITH OBSTRUCTION/LOWER URINARY TRACT SYMPTOMS: Status: RESOLVED | Noted: 2021-03-29 | Resolved: 2025-06-19

## 2025-06-19 LAB
ALBUMIN SERPL BCG-MCNC: 3.4 G/DL (ref 3.5–5)
ALP SERPL-CCNC: 121 U/L (ref 34–104)
ALT SERPL W P-5'-P-CCNC: 40 U/L (ref 7–52)
ANION GAP SERPL CALCULATED.3IONS-SCNC: 9 MMOL/L (ref 4–13)
AST SERPL W P-5'-P-CCNC: 18 U/L (ref 13–39)
BASOPHILS # BLD MANUAL: 0.11 THOUSAND/UL (ref 0–0.1)
BASOPHILS NFR MAR MANUAL: 1 % (ref 0–1)
BILIRUB SERPL-MCNC: 0.27 MG/DL (ref 0.2–1)
BUN SERPL-MCNC: 26 MG/DL (ref 5–25)
CALCIUM ALBUM COR SERPL-MCNC: 9.4 MG/DL (ref 8.3–10.1)
CALCIUM SERPL-MCNC: 8.9 MG/DL (ref 8.4–10.2)
CHLORIDE SERPL-SCNC: 105 MMOL/L (ref 96–108)
CO2 SERPL-SCNC: 24 MMOL/L (ref 21–32)
CREAT SERPL-MCNC: 1.27 MG/DL (ref 0.6–1.3)
EOSINOPHIL # BLD MANUAL: 0.21 THOUSAND/UL (ref 0–0.4)
EOSINOPHIL NFR BLD MANUAL: 2 % (ref 0–6)
ERYTHROCYTE [DISTWIDTH] IN BLOOD BY AUTOMATED COUNT: 14.6 % (ref 11.6–15.1)
GFR SERPL CREATININE-BSD FRML MDRD: 63 ML/MIN/1.73SQ M
GGT SERPL-CCNC: 95 U/L (ref 9–64)
GLUCOSE SERPL-MCNC: 92 MG/DL (ref 65–140)
HCT VFR BLD AUTO: 37.1 % (ref 36.5–49.3)
HGB BLD-MCNC: 11.9 G/DL (ref 12–17)
LYMPHOCYTES # BLD AUTO: 2.97 THOUSAND/UL (ref 0.6–4.47)
LYMPHOCYTES # BLD AUTO: 28 % (ref 14–44)
MCH RBC QN AUTO: 28.6 PG (ref 26.8–34.3)
MCHC RBC AUTO-ENTMCNC: 32.1 G/DL (ref 31.4–37.4)
MCV RBC AUTO: 89 FL (ref 82–98)
MONOCYTES # BLD AUTO: 0.85 THOUSAND/UL (ref 0–1.22)
MONOCYTES NFR BLD: 8 % (ref 4–12)
MYELOCYTE ABSOLUTE CT: 0.21 THOUSAND/UL (ref 0–0.1)
MYELOCYTES NFR BLD MANUAL: 2 % (ref 0–1)
NEUTROPHILS # BLD MANUAL: 6.26 THOUSAND/UL (ref 1.85–7.62)
NEUTS BAND NFR BLD MANUAL: 2 % (ref 0–8)
NEUTS SEG NFR BLD AUTO: 57 % (ref 43–75)
PLATELET # BLD AUTO: 290 THOUSANDS/UL (ref 149–390)
PLATELET BLD QL SMEAR: ADEQUATE
PMV BLD AUTO: 9.8 FL (ref 8.9–12.7)
POTASSIUM SERPL-SCNC: 3.9 MMOL/L (ref 3.5–5.3)
PROT SERPL-MCNC: 7.3 G/DL (ref 6.4–8.4)
RBC # BLD AUTO: 4.16 MILLION/UL (ref 3.88–5.62)
RBC MORPH BLD: NORMAL
SMUDGE CELLS BLD QL SMEAR: PRESENT
SODIUM SERPL-SCNC: 138 MMOL/L (ref 135–147)
WBC # BLD AUTO: 10.61 THOUSAND/UL (ref 4.31–10.16)

## 2025-06-19 PROCEDURE — 82977 ASSAY OF GGT: CPT

## 2025-06-19 PROCEDURE — 85027 COMPLETE CBC AUTOMATED: CPT

## 2025-06-19 PROCEDURE — 85007 BL SMEAR W/DIFF WBC COUNT: CPT

## 2025-06-19 PROCEDURE — 80053 COMPREHEN METABOLIC PANEL: CPT

## 2025-06-19 RX ORDER — OXYCODONE HYDROCHLORIDE 5 MG/1
2.5 TABLET ORAL EVERY 6 HOURS PRN
Qty: 15 TABLET | Refills: 0 | Status: CANCELLED | OUTPATIENT
Start: 2025-06-19

## 2025-06-19 RX ORDER — ACETAMINOPHEN 500 MG
1000 TABLET ORAL EVERY 6 HOURS PRN
Qty: 90 TABLET | Refills: 0 | Status: SHIPPED | OUTPATIENT
Start: 2025-06-19

## 2025-06-19 RX ORDER — CEFPODOXIME PROXETIL 200 MG/1
200 TABLET, FILM COATED ORAL 2 TIMES DAILY WITH MEALS
Status: DISCONTINUED | OUTPATIENT
Start: 2025-06-19 | End: 2025-06-19 | Stop reason: HOSPADM

## 2025-06-19 RX ORDER — OXYCODONE HYDROCHLORIDE 5 MG/1
5 TABLET ORAL EVERY 4 HOURS PRN
Refills: 0 | Status: DISCONTINUED | OUTPATIENT
Start: 2025-06-19 | End: 2025-06-19

## 2025-06-19 RX ORDER — CEFPODOXIME PROXETIL 200 MG/1
200 TABLET, FILM COATED ORAL 2 TIMES DAILY
Qty: 13 TABLET | Refills: 0 | Status: SHIPPED | OUTPATIENT
Start: 2025-06-19 | End: 2025-06-26

## 2025-06-19 RX ORDER — OXYCODONE HYDROCHLORIDE 5 MG/1
2.5 TABLET ORAL EVERY 6 HOURS PRN
Qty: 15 TABLET | Refills: 0 | Status: SHIPPED | OUTPATIENT
Start: 2025-06-19

## 2025-06-19 RX ORDER — OXYCODONE HYDROCHLORIDE 5 MG/1
5 TABLET ORAL EVERY 4 HOURS PRN
Qty: 15 TABLET | Refills: 0 | Status: CANCELLED | OUTPATIENT
Start: 2025-06-19 | End: 2025-06-22

## 2025-06-19 RX ADMIN — ACETAMINOPHEN 975 MG: 325 TABLET, FILM COATED ORAL at 05:21

## 2025-06-19 RX ADMIN — CEFPODOXIME PROXETIL 200 MG: 200 TABLET, FILM COATED ORAL at 09:54

## 2025-06-19 RX ADMIN — LISINOPRIL 40 MG: 20 TABLET ORAL at 08:00

## 2025-06-19 RX ADMIN — KETOROLAC TROMETHAMINE 15 MG: 30 INJECTION, SOLUTION INTRAMUSCULAR at 03:43

## 2025-06-19 NOTE — ASSESSMENT & PLAN NOTE
BP Readings from Last 3 Encounters:   06/19/25 150/90   12/30/24 136/80   12/23/24 140/90      Home med: lisinopril 40 mg tablet daily  Reports not taking home med post prostatectomy (12/24) due to normal Bps    Plan  Continue lisinopril 40 mg tablet daily   Follow up with PCP to adjust BP medications

## 2025-06-19 NOTE — UTILIZATION REVIEW
Continued Stay Review    Date: 6/19/25                          Current Patient Class: Inpatient  Current Level of Care: Med Surg    HPI:54 y.o. male initially admitted on 6/14     Current Diagnosis: Seminal vesicle abscess    Assessment/Plan: Urology and IR consulted and they both agreed on antibiotic treatment and no surgical interventions. He was initially started on levofloxacin and then switched to ceftriaxone due to sensitivities.1/2 blood culture and urine culture grew gram negative rods. WBC trending down. Pain control archieved with tylenol, toradol Q6 HR PRN.     Medications:   Scheduled Medications:  acetaminophen, 975 mg, Oral, Q6H SOCO  cefpodoxime, 200 mg, Oral, BID With Meals  lisinopril, 40 mg, Oral, Daily  melatonin, 3 mg, Oral, HS      Continuous IV Infusions:     PRN Meds:  ketorolac, 15 mg, Intravenous, Q6H PRN  oxyCODONE, 2.5 mg, Oral, Q6H PRN      Discharge Plan: TBD    Vital Signs (last 3 days)       Date/Time Temp Pulse Resp BP MAP (mmHg) SpO2 O2 Device Patient Position - Orthostatic VS Lilly Coma Scale Score Pain    06/19/25 0900 -- -- -- 150/90 -- -- -- Sitting -- --    06/19/25 0739 97.9 °F (36.6 °C) 84 20 162/103 127 97 % None (Room air) Sitting -- No Pain    06/19/25 0521 -- -- -- -- -- -- -- -- -- No Pain    06/19/25 0343 -- -- -- -- -- -- -- -- -- 10 - Worst Possible Pain    06/18/25 2320 -- -- -- -- -- -- -- -- -- No Pain    06/18/25 2146 98.1 °F (36.7 °C) 78 16 164/106 122 98 % None (Room air) Lying -- --    06/18/25 1930 -- -- -- -- -- -- -- -- 15 9    06/18/25 1744 -- -- -- -- -- -- -- -- -- 6    06/18/25 1515 -- -- -- 124/100 -- -- -- Sitting -- --    06/18/25 1447 98 °F (36.7 °C) 84 18 151/112 124 97 % None (Room air) Sitting -- --    06/18/25 1130 -- -- -- -- -- -- -- -- -- 3    06/18/25 0856 -- -- -- -- -- -- -- -- -- 10 - Worst Possible Pain    06/18/25 0755 97.8 °F (36.6 °C) 85 18 157/103 116 97 % None (Room air) Lying -- --    06/18/25 0105 -- -- -- -- -- -- -- -- -- 8     06/17/25 2251 98.5 °F (36.9 °C) 75 18 164/99 112 98 % None (Room air) Lying -- --    06/17/25 2100 -- -- -- -- -- -- -- -- 15 No Pain    06/17/25 1938 -- -- -- -- -- -- -- -- -- 10 - Worst Possible Pain    06/17/25 1456 98.8 °F (37.1 °C) 81 20 158/104 117 96 % None (Room air) Lying -- --    06/17/25 1403 -- -- -- -- -- -- -- -- -- 10 - Worst Possible Pain    06/17/25 0812 -- -- -- -- -- -- -- -- -- 10 - Worst Possible Pain    06/17/25 0706 98.8 °F (37.1 °C) 81 20 158/99 116 97 % None (Room air) Lying -- --    06/17/25 0151 -- -- -- -- -- -- -- -- -- 10 - Worst Possible Pain    06/17/25 0045 -- -- -- -- -- -- -- -- -- 10 - Worst Possible Pain    06/16/25 2337 99.3 °F (37.4 °C) 92 16 173/114 135 96 % None (Room air) Lying -- --    06/16/25 2328 -- -- -- -- -- -- -- -- -- 8    06/16/25 2244 99.2 °F (37.3 °C) 89 16 160/105 119 98 % None (Room air) Lying -- --    06/16/25 2125 99.4 °F (37.4 °C) 96 16 164/106 120 97 % None (Room air) Lying -- --    06/16/25 1945 -- -- -- -- -- -- -- -- 15 10 - Worst Possible Pain    06/16/25 1500 98.4 °F (36.9 °C) 93 18 147/96 107 98 % None (Room air) Lying -- --    06/16/25 1315 -- -- -- -- -- -- -- -- -- 10 - Worst Possible Pain    06/16/25 1100 -- -- -- -- -- -- -- -- -- 3    06/16/25 0723 98.3 °F (36.8 °C) 79 18 140/95 120 98 % None (Room air) Lying -- --    06/16/25 0600 -- -- -- -- -- -- -- -- -- 6    06/16/25 0207 -- -- -- -- -- -- -- -- -- 10 - Worst Possible Pain          Weight (last 2 days)       Date/Time Weight    06/19/25 0536 66.2 (146)    06/17/25 0546 67.1 (148)            Pertinent Labs/Diagnostic Results:   Radiology:  CT abdomen pelvis with contrast   Final Interpretation by Enrique Kwong MD (06/15 2427)      Findings above suggestive of seminal vesiculitis with associated 3.4 cm abscess within the left seminal vesicle. Clinical correlation and urologic follow-up recommended.   Additional findings as above suspicious for concomitant cystitis and ascending urinary tract  infection. Correlation with urinalysis recommended.      Aforementioned findings were discussed with Dr. Gannon at 3:22 a.m. on 6/15/2025         Workstation performed: [id number]         US scrotum and testicles   Final Interpretation by Adán Lima MD (06/15 0159)      Findings compatible with left epididymitis. Associated left scrotal thickening and edema. Mild left varicocele.      Mildly enlarged, hyperemic right epididymal tail.      No evidence of testicular torsion.      Workstation performed: [id number]           Cardiology:  No orders to display     GI:  No orders to display           Results from last 7 days   Lab Units 06/19/25 0443 06/18/25  0509 06/17/25 0447 06/16/25  0529 06/15/25  0549   WBC Thousand/uL 10.61* 12.07* 14.15* 15.50* 18.30*   HEMOGLOBIN g/dL 11.9* 12.7 11.8* 11.6* 12.7   HEMATOCRIT % 37.1 38.5 37.1 35.8* 38.4   PLATELETS Thousands/uL 290 264 236 182 190   TOTAL NEUT ABS Thousands/µL  --  8.04* 10.71* 12.29*  --    BANDS PCT % 2  --   --   --  10*         Results from last 7 days   Lab Units 06/19/25 0443 06/18/25  0509 06/17/25 0447 06/16/25  0529 06/15/25  0549 06/14/25  2359   SODIUM mmol/L 138 137 136 139 135 133*   POTASSIUM mmol/L 3.9 4.1 4.4 3.7 3.4* 3.3*   CHLORIDE mmol/L 105 105 105 110* 102 97   CO2 mmol/L 24 23 23 24 25 28   ANION GAP mmol/L 9 9 8 5 8 8   BUN mg/dL 26* 21 15 16 19 20   CREATININE mg/dL 1.27 1.10 1.16 1.17 1.27 1.62*   EGFR ml/min/1.73sq m 63 75 70 69 63 47   CALCIUM mg/dL 8.9 9.2 9.0 8.5 8.7 9.1   MAGNESIUM mg/dL  --   --   --   --   --  2.1   PHOSPHORUS mg/dL  --   --   --   --   --  3.6     Results from last 7 days   Lab Units 06/19/25 0443 06/18/25  0509   AST U/L 18 22   ALT U/L 40 49   ALK PHOS U/L 121* 135*   TOTAL PROTEIN g/dL 7.3 7.5   ALBUMIN g/dL 3.4* 3.5   TOTAL BILIRUBIN mg/dL 0.27 0.45         Results from last 7 days   Lab Units 06/19/25  0443 06/18/25  0509 06/17/25  0447 06/16/25  0529 06/15/25  0549 06/14/25  2359   GLUCOSE RANDOM mg/dL 92  78 76 108 97 101           Results from last 7 days   Lab Units 06/14/25 2359   PROCALCITONIN ng/ml 12.45*     Results from last 7 days   Lab Units 06/15/25  0115   LACTIC ACID mmol/L 0.8           Results from last 7 days   Lab Units 06/14/25 2359   CLARITY UA  Slightly Cloudy*   COLOR UA  Katia*   SPEC GRAV UA  1.015   PH UA  5.0   GLUCOSE UA mg/dl Negative   KETONES UA mg/dl Negative   BLOOD UA  50.0*   PROTEIN UA mg/dl 30 (1+)*   NITRITE UA  Negative   BILIRUBIN UA  Negative   UROBILINOGEN UA mg/dL Negative   LEUKOCYTES UA  500.0*   WBC UA /hpf Innumerable*   RBC UA /hpf 2-4   BACTERIA UA /hpf Innumerable*   EPITHELIAL CELLS WET PREP /hpf Occasional   MUCUS THREADS  Moderate*           Results from last 7 days   Lab Units 06/15/25  0120 06/15/25  0115 06/14/25  2359   BLOOD CULTURE  Escherichia coli* No Growth at 72 hrs.  --    GRAM STAIN RESULT  Gram negative rods*  --   --    URINE CULTURE   --   --  20,000-29,000 cfu/ml Escherichia coli*             Network Utilization Review Department  ATTENTION: Please call with any questions or concerns to [phone] and carefully listen to the prompts so that you are directed to the right person. All voicemails are confidential.   For Discharge needs, contact Care Management DC Support Team at [phone] opt. 2  Send all requests for admission clinical reviews, approved or denied determinations and any other requests to dedicated fax number below belonging to the campus where the patient is receiving treatment. List of dedicated fax numbers for the Facilities:  FACILITY NAME UR FAX NUMBER   ADMISSION DENIALS (Administrative/Medical Necessity) [phone]   DISCHARGE SUPPORT TEAM (NETWORK) [phone]   PARENT CHILD HEALTH (Maternity/NICU/Pediatrics) [phone]   [facility] [phone]   [facility] [phone]   [facility] [phone]   [facility]  [phone]   [facility] [phone]   [facility] [phone]   [facility] [phone]   [facility] [phone]   [facility] [phone]   [facility] [phone]   [facility] [phone]   [facility] [phone]

## 2025-06-19 NOTE — ASSESSMENT & PLAN NOTE
Left testicle over double the size of right testicle  CT abdomen and pelvis witH contrast (6/15/25): seminal vesiculitis with associated 3.4 cm abscess within the left seminal vesicle.   Blood culture  #1 is negative 24 hr, #2 is positive for E. Coli  Urine culture is 20-29K Gr negative rods  WBC 18.3> 15.5> 14.15 > 12K  IR consulted: no drainage was recommended.     Plan    Start cefpodoxime BID , first does given in the hospital day of discharge ( 1/7)   Encourage hydration  Pain control with tylenol 1000mg Q 6HR and Oxycodone for severe and breakthrough pain 2.5 mg Q 6Hr   Follow up with urology

## 2025-06-19 NOTE — PLAN OF CARE
Problem: PAIN - ADULT  Goal: Verbalizes/displays adequate comfort level or baseline comfort level  Description: Interventions:  - Encourage patient to monitor pain and request assistance  - Assess pain using appropriate pain scale  - Administer analgesics as ordered based on type and severity of pain and evaluate response  - Implement non-pharmacological measures as appropriate and evaluate response  - Consider cultural and social influences on pain and pain management  - Notify physician/advanced practitioner if interventions unsuccessful or patient reports new pain  - Educate patient/family on pain management process including their role and importance of  reporting pain   - Provide non-pharmacologic/complimentary pain relief interventions  6/18/2025 2015 by Iveth Steward RN  Outcome: Progressing  6/18/2025 2014 by Iveth Steward RN  Outcome: Progressing     Problem: INFECTION - ADULT  Goal: Absence or prevention of progression during hospitalization  Description: INTERVENTIONS:  - Assess and monitor for signs and symptoms of infection  - Monitor lab/diagnostic results  - Administer medications as ordered  - Instruct and encourage patient and family to use good hand hygiene technique  - Identify and instruct in appropriate isolation precautions for identified infection/condition  6/18/2025 2015 by Iveth Steward RN  Outcome: Progressing  6/18/2025 2014 by Iveth Steward RN  Outcome: Progressing     Problem: GENITOURINARY - ADULT  Goal: Maintains or returns to baseline urinary function  Description: INTERVENTIONS:  - Assess urinary function  - Encourage oral fluids to ensure adequate hydration if ordered  - Administer IV fluids as ordered to ensure adequate hydration  - Administer ordered medications as needed  - Offer frequent toileting  - Follow urinary retention protocol if ordered  6/18/2025 2015 by Iveth Steward RN  Outcome: Progressing  6/18/2025 2014 by Iveth Steward RN  Outcome: Progressing      Problem: GENITOURINARY - ADULT  Goal: Absence of urinary retention  Description: INTERVENTIONS:  - Assess patient's ability to void and empty bladder  - Monitor I/O  - Bladder scan as needed  - Discuss with physician/AP medications to alleviate retention as needed  - Discuss catheterization for long term situations as appropriate  6/18/2025 2015 by Iveth Steward RN  Outcome: Progressing  6/18/2025 2014 by Iveth Steward RN  Outcome: Progressing

## 2025-06-19 NOTE — DISCHARGE INSTR - AVS FIRST PAGE
Please complete antibiotics for 13 more doses.     If fever or extreme pain please return to the emergency room.   Please see your PCP within a week.   Please schedule a follow up appointment with urology , preferably within a week.

## 2025-06-19 NOTE — ASSESSMENT & PLAN NOTE
Tender left testicle  Ultrasound scrotum (6/15): left epididymitis. Associated left scrotal thickening and edema. Mild left varicocele     - urology outpatient

## 2025-06-20 ENCOUNTER — TRANSITIONAL CARE MANAGEMENT (OUTPATIENT)
Dept: FAMILY MEDICINE CLINIC | Facility: CLINIC | Age: 55
End: 2025-06-20

## 2025-06-20 LAB — BACTERIA BLD CULT: NORMAL

## 2025-06-20 NOTE — UTILIZATION REVIEW
NOTIFICATION OF ADMISSION DISCHARGE   This is a Notification of Discharge from [facility]. Please be advised that this patient has been discharge from our facility. Below you will find the admission and discharge date and time including the patient’s disposition.   UTILIZATION REVIEW CONTACT:  Utilization Review Assistants  Network Utilization Review Department  Phone: [phone] x carefully listen to the prompts. All voicemails are confidential.  Email: [email or web address]     ADMISSION INFORMATION  PRESENTATION DATE: 6/14/2025 11:25 PM  OBERVATION ADMISSION DATE: N/A  INPATIENT ADMISSION DATE: 6/15/25  3:53 AM   DISCHARGE DATE: 6/19/2025 12:15 PM   DISPOSITION:Home/Self Care    Network Utilization Review Department  ATTENTION: Please call with any questions or concerns to [phone] and carefully listen to the prompts so that you are directed to the right person. All voicemails are confidential.   For Discharge needs, contact Care Management DC Support Team at [phone] opt. 2  Send all requests for admission clinical reviews, approved or denied determinations and any other requests to dedicated fax number below belonging to the campus where the patient is receiving treatment. List of dedicated fax numbers for the Facilities:  FACILITY NAME UR FAX NUMBER   ADMISSION DENIALS (Administrative/Medical Necessity) [phone]   DISCHARGE SUPPORT TEAM ([facility]) [phone]   PARENT CHILD HEALTH (Maternity/NICU/Pediatrics) [phone]   [facility] [phone]   [facility] [phone]   [facility] [phone]   [facility] [phone]   Critical access hospital [phone]   [facility] [phone]   [facility] [phone]   [facility] [phone]   [facility]  [facility] [phone]   [facility] [phone]   [facility] [phone]   [facility] [phone]

## 2025-06-23 ENCOUNTER — TELEPHONE (OUTPATIENT)
Dept: FAMILY MEDICINE CLINIC | Facility: CLINIC | Age: 55
End: 2025-06-23

## 2025-06-23 ENCOUNTER — OFFICE VISIT (OUTPATIENT)
Dept: FAMILY MEDICINE CLINIC | Facility: CLINIC | Age: 55
End: 2025-06-23

## 2025-06-23 VITALS
WEIGHT: 144 LBS | DIASTOLIC BLOOD PRESSURE: 110 MMHG | SYSTOLIC BLOOD PRESSURE: 140 MMHG | BODY MASS INDEX: 22.6 KG/M2 | TEMPERATURE: 98 F | OXYGEN SATURATION: 98 % | HEART RATE: 100 BPM | RESPIRATION RATE: 16 BRPM | HEIGHT: 67 IN

## 2025-06-23 DIAGNOSIS — Z09 HOSPITAL DISCHARGE FOLLOW-UP: Primary | ICD-10-CM

## 2025-06-23 DIAGNOSIS — I10 PRIMARY HYPERTENSION: ICD-10-CM

## 2025-06-23 PROCEDURE — 99496 TRANSJ CARE MGMT HIGH F2F 7D: CPT

## 2025-06-23 RX ORDER — LISINOPRIL 40 MG/1
40 TABLET ORAL DAILY
Qty: 30 TABLET | Refills: 0 | Status: SHIPPED | OUTPATIENT
Start: 2025-06-23

## 2025-06-23 NOTE — ASSESSMENT & PLAN NOTE
BP noted to be severely elevated today in office.    Patient reports He is not taking medications regularly as instructed. He reports he discontinued his antihypertensive after his TURP as he felt like his urinary retention was the etiology for his HTN. He denies discontinuing due to medication side effects. He is not performing home BP monitoring.     BP Readings from Last 3 Encounters:   06/23/25 (!) 140/110   06/19/25 150/90   12/30/24 136/80       Current medication regimen: NONE    PLAN:  Will restart lisinopril (Prinivil) 40 mg daily.  Counseled on dietary sodium restriction.  Counseled on regular aerobic exercise.  Follow up in 2 weeks or sooner as needed.    Orders:    lisinopril (ZESTRIL) 40 mg tablet; Take 1 tablet (40 mg total) by mouth daily

## 2025-06-23 NOTE — PROGRESS NOTES
Transition of Care Visit:  Name: Roby Louis      : 1970      MRN: [id number]  Encounter Provider: TONY Zhang  Encounter Date: 2025   Encounter department: [facility]    Assessment & Plan  Hospital discharge follow-up  Patient reports ongoing pain and swelling of the scrotum. However, he denies any ongoing symptoms of infection. He has a follow up appointment with Urology on 25.   Advised to elevate the scrotum while seated with a rolled/folded towel to assist with swelling and wear supportive underwear.   Advised to return to the ED or call urology if signs of infection return.   He is compliant with the antibiotic. Encouraged to complete entire course.        Primary hypertension  BP noted to be severely elevated today in office.    Patient reports He is not taking medications regularly as instructed. He reports he discontinued his antihypertensive after his TURP as he felt like his urinary retention was the etiology for his HTN. He denies discontinuing due to medication side effects. He is not performing home BP monitoring.     BP Readings from Last 3 Encounters:   25 (!) 140/110   25 150/90   24 136/80       Current medication regimen: NONE    PLAN:  Will restart lisinopril (Prinivil) 40 mg daily.  Counseled on dietary sodium restriction.  Counseled on regular aerobic exercise.  Follow up in 2 weeks or sooner as needed.    Orders:    lisinopril (ZESTRIL) 40 mg tablet; Take 1 tablet (40 mg total) by mouth daily         History of Present Illness     Transitional Care Management Review:   Roby Louis is a 54 y.o. male here for TCM follow up.     During the TCM phone call patient stated:  TCM Call (since 2025)       Date and time call was made  2025 10:05 AM    Patient was hospitialized at  [facility]    Date of Admission  25    Date of discharge  25    Diagnosis  Abscess of  seminal vesicle    Disposition  Home    Were the patients medications reviewed and updated  No          TCM Call (since 6/9/2025)       Scheduled for follow up?  Yes    Did you obtain your prescribed medications  Yes    Do you need help managing your prescriptions or medications  No    Is transportation to your appointment needed  No    Living Arrangements  Family members              Roby Louis is a 54 year old male with a history of HTN and s/p prostatectomy (12/2024) due to BPH. He presents to the office today to follow up after a recent hospitalization.     He was admitted to the [facility] from 06/14/25 - 06/19/25 for acute left-sided scrotal swelling. He was treated for seminal vesiculitis with associated abscess shown on CT abd/pelvis. Urology was consulted and antibiotic treatment with levaquin and hydration was recommended with close monitoring. IR consulted 2nd day of admission due to concerns of induration showing minimal improvement- no surgical intervention /drainage was recommended by them, urology agreed with IR plan. He was initially started on levofloxacin for 3 doses and then switched to ceftriaxone due to sensitivities. He was discharged home on oral Vantin which he reports he is compliant with. He was advised to follow up with primary care for management of his hypertension. He was previously on lisinopril 40 mg but reports he self-discontinued after his prostatectomy as he felt that his HTN was secondary to urinary retention. He denies current symptoms of elevated blood pressure including chest pain, palpitations, headache, vision changes, lower extremity edema.         Review of Systems   Constitutional:  Negative for chills and fever.   HENT:  Negative for ear pain and sore throat.    Eyes:  Negative for pain and visual disturbance.   Respiratory:  Negative for cough and shortness of breath.    Cardiovascular:  Negative for chest pain and palpitations.   Gastrointestinal:   "Negative for abdominal pain and vomiting.   Genitourinary:  Positive for scrotal swelling and testicular pain. Negative for difficulty urinating, dysuria, flank pain and hematuria.   Musculoskeletal:  Negative for arthralgias and back pain.   Skin:  Negative for color change and rash.   Neurological:  Negative for seizures and syncope.   All other systems reviewed and are negative.    Objective   BP (!) 140/110 (BP Location: Right arm, Patient Position: Sitting, Cuff Size: Standard)   Pulse 100   Temp 98 °F (36.7 °C) (Temporal)   Resp 16   Ht 5' 7\" (1.702 m)   Wt 65.3 kg (144 lb)   SpO2 98%   BMI 22.55 kg/m²     Physical Exam  Vitals and nursing note reviewed.   Constitutional:       General: He is not in acute distress.     Appearance: He is well-developed.   HENT:      Head: Normocephalic and atraumatic.     Eyes:      Conjunctiva/sclera: Conjunctivae normal.       Cardiovascular:      Rate and Rhythm: Normal rate and regular rhythm.      Heart sounds: No murmur heard.  Pulmonary:      Effort: Pulmonary effort is normal. No respiratory distress.      Breath sounds: Normal breath sounds.   Abdominal:      Palpations: Abdomen is soft.      Tenderness: There is no abdominal tenderness.     Musculoskeletal:         General: No swelling.      Cervical back: Neck supple.     Skin:     General: Skin is warm and dry.      Capillary Refill: Capillary refill takes less than 2 seconds.     Neurological:      Mental Status: He is alert.     Psychiatric:         Mood and Affect: Mood normal.       Medications have been reviewed by provider in current encounter      "

## 2025-06-23 NOTE — TELEPHONE ENCOUNTER
"I saw Mr Ca today outside of our office when he was coming for his hospital discharge follow appointment. He informed me that he lost his job due to eing in the hospital. I asked if he saw them the letter from the hospital he said yes he texted it to his supervisor. He tried to call him while he was in the hospital however he didn't picked up. I asked if there was something I can do it, he asked if I can contact his supervisor.   He gave me supervisor Babak [phone]  I called Babak, I explained to him that I was the attending physician at the hospital when Roby was admitted in the hospital. I told him that we kept Roby in the hospital for a few days because he was not medically stable for discharge. I asked if there was anything that they want us to send to them to prove it. He said no, because \"Roby didn't keep communication with his supervisor.\" I told him that he did saw me text messages that he sent to the supervisor, he however states they required a call.   I thanked him for his time  I called Roby back to inform him about my phone call to Babak. We are available, if there is anything we can help him with     Andria Roman MD  06/23/25   "

## 2025-07-01 ENCOUNTER — OFFICE VISIT (OUTPATIENT)
Dept: UROLOGY | Facility: CLINIC | Age: 55
End: 2025-07-01

## 2025-07-01 ENCOUNTER — TELEPHONE (OUTPATIENT)
Dept: UROLOGY | Facility: CLINIC | Age: 55
End: 2025-07-01

## 2025-07-01 VITALS
HEART RATE: 92 BPM | WEIGHT: 142 LBS | SYSTOLIC BLOOD PRESSURE: 132 MMHG | OXYGEN SATURATION: 99 % | HEIGHT: 68 IN | BODY MASS INDEX: 21.52 KG/M2 | DIASTOLIC BLOOD PRESSURE: 86 MMHG

## 2025-07-01 DIAGNOSIS — N49.0: ICD-10-CM

## 2025-07-01 DIAGNOSIS — N45.1 EPIDIDYMITIS: ICD-10-CM

## 2025-07-01 PROCEDURE — 99213 OFFICE O/P EST LOW 20 MIN: CPT | Performed by: PHYSICIAN ASSISTANT

## 2025-07-01 NOTE — ASSESSMENT & PLAN NOTE
Orders:    Ambulatory referral to Urology  I would like to see him back in 3 months for follow-up  Would consider a pelvic MRI for worsening symptoms

## 2025-07-01 NOTE — TELEPHONE ENCOUNTER
Randal Coleman,    Gurinder valenzuela,    Please kindly assist and switch appointment scheduled with Dilan on 10/1/2025 @ 1:30 PM from NP to Fu appointment. Confirmed with patient at check out.      See Dilan Notes Below:  Return in about 3 months (around 10/1/2025) for follow up.

## 2025-07-01 NOTE — PROGRESS NOTES
"Name: Roby Louis      : 1970      MRN: [id number]  Encounter Provider: Dilan Crowell PA-C  Encounter Date: 2025   Encounter department: [facility]  :  Assessment & Plan  Epididymitis    Orders:    Ambulatory referral to Urology  I would like to see him back in 3 months for follow-up  Would consider a pelvic MRI for worsening symptoms  Abscess of seminal vesicle    Orders:    Ambulatory referral to Urology        History of Present Illness   Roby Louis is a 54 y.o. male who presents history of BPH and retention.  He had a robotic simple prostatectomy in the past by Dr. Herzog.  He went to the emergency room with lower abdominal pain and pelvic pain.  He was diagnosed with left-sided epididymal orchitis and was found to have a seminal vesicle abscess.  This area was unable to be aspirated.  His symptoms have nearly completely resolved.  He has some residual induration of the left testicle.    Review of Systems       Objective   /86 (BP Location: Left arm, Patient Position: Sitting, Cuff Size: Standard)   Pulse 92   Ht 5' 8\" (1.727 m)   Wt 64.4 kg (142 lb)   SpO2 99%   BMI 21.59 kg/m²     Physical Exam GEN: no acute distress    RESP: breathing comfortably with no accessory muscle use    ABD: soft, non-tender, non-distended   INCISION:    EXT: no significant peripheral edema   (Male): Penis uncircumcised, phallus normal, meatus patent.  Testicles descended into scrotum bilaterally left testicle has some residual induration but no significant tenderness    RADIOLOGY:   IMPRESSION:     Findings above suggestive of seminal vesiculitis with associated 3.4 cm abscess within the left seminal vesicle. Clinical correlation and urologic follow-up recommended.  Additional findings as above suspicious for concomitant cystitis and ascending urinary tract infection. Correlation with urinalysis recommended.        Results   Lab Results   Component " Value Date    PSA 9.061 (H) 11/19/2024    PSA 4.0 04/05/2021     Lab Results   Component Value Date    CALCIUM 8.9 06/19/2025    K 3.9 06/19/2025    CO2 24 06/19/2025     06/19/2025    BUN 26 (H) 06/19/2025    CREATININE 1.27 06/19/2025     Lab Results   Component Value Date    WBC 10.61 (H) 06/19/2025    HGB 11.9 (L) 06/19/2025    HCT 37.1 06/19/2025    MCV 89 06/19/2025     06/19/2025       Office Urine Dip  No results found for this or any previous visit (from the past hour).

## 2025-07-03 PROBLEM — R97.20 ELEVATED PSA: Status: RESOLVED | Noted: 2024-04-12 | Resolved: 2025-07-03

## 2025-07-03 PROBLEM — R63.4 UNINTENTIONAL WEIGHT LOSS: Status: RESOLVED | Noted: 2024-02-02 | Resolved: 2025-07-03

## 2025-07-03 PROBLEM — G89.29 CHRONIC RIGHT SHOULDER PAIN: Status: RESOLVED | Noted: 2021-06-07 | Resolved: 2025-07-03

## 2025-07-03 PROBLEM — R01.1 MURMUR: Status: RESOLVED | Noted: 2021-06-07 | Resolved: 2025-07-03

## 2025-07-03 PROBLEM — R33.8 URINARY RETENTION DUE TO BENIGN PROSTATIC HYPERPLASIA: Status: RESOLVED | Noted: 2024-08-19 | Resolved: 2025-07-03

## 2025-07-03 PROBLEM — M25.511 CHRONIC RIGHT SHOULDER PAIN: Status: RESOLVED | Noted: 2021-06-07 | Resolved: 2025-07-03

## 2025-07-03 PROBLEM — R00.0 TACHYCARDIA: Status: RESOLVED | Noted: 2024-02-02 | Resolved: 2025-07-03

## 2025-07-03 PROBLEM — N40.1 URINARY RETENTION DUE TO BENIGN PROSTATIC HYPERPLASIA: Status: RESOLVED | Noted: 2024-08-19 | Resolved: 2025-07-03

## 2025-07-03 PROBLEM — Z71.2 PERSON CONSULTING FOR EXPLANATION OF EXAMINATION OR TEST FINDING: Status: RESOLVED | Noted: 2024-08-19 | Resolved: 2025-07-03

## 2025-07-18 DIAGNOSIS — I10 PRIMARY HYPERTENSION: ICD-10-CM

## 2025-07-18 RX ORDER — LISINOPRIL 40 MG/1
40 TABLET ORAL DAILY
Qty: 90 TABLET | Refills: 1 | Status: SHIPPED | OUTPATIENT
Start: 2025-07-18

## (undated) DEVICE — CYSTO TUBING TUR Y IRRIGATION

## (undated) DEVICE — SUT VICRYL 0 UR-6 27 IN J603H

## (undated) DEVICE — SURGIFLO ENDOSCOPIC APPICATOR: Brand: ETHICON

## (undated) DEVICE — CANNULA SEAL

## (undated) DEVICE — FENESTRATED BIPOLAR FORCEPS: Brand: ENDOWRIST

## (undated) DEVICE — LARGE NEEDLE DRIVER: Brand: ENDOWRIST

## (undated) DEVICE — PREMIUM DRY TRAY LF: Brand: MEDLINE INDUSTRIES, INC.

## (undated) DEVICE — GLOVE INDICATOR PI UNDERGLOVE SZ 8 BLUE

## (undated) DEVICE — TISSUE RETRIEVAL SYSTEM: Brand: INZII RETRIEVAL SYSTEM

## (undated) DEVICE — SURGICEL 4 X 8IN

## (undated) DEVICE — KERLIX BANDAGE ROLL: Brand: KERLIX

## (undated) DEVICE — CATHETER PLUG WITH CAP: Brand: DOVER

## (undated) DEVICE — TROCAR PORT ACCESS 12 X120MM W/BLDLS OPTICAL TIP AIRSEAL

## (undated) DEVICE — SUT VLOC 90 3-0 V-20 9IN VLOCM0644

## (undated) DEVICE — BAG URINE DRAINAGE URIMETER 350ML LF

## (undated) DEVICE — INTENDED FOR TISSUE SEPARATION, AND OTHER PROCEDURES THAT REQUIRE A SHARP SURGICAL BLADE TO PUNCTURE OR CUT.: Brand: BARD-PARKER SAFETY BLADES SIZE 11, STERILE

## (undated) DEVICE — SUT SILK 0 FSL 18 IN 678G

## (undated) DEVICE — CATH FOLEY COUDE 20FR 5ML 2 WAY TIEMANN LUBRICATH

## (undated) DEVICE — GLOVE SRG BIOGEL ECLIPSE 7.5

## (undated) DEVICE — SUT VICRYL 0 CT-1 36 IN J946H

## (undated) DEVICE — EXOFIN PRECISION PEN HIGH VISCOSITY TOPICAL SKIN ADHESIVE: Brand: EXOFIN PRECISION PEN, 1G

## (undated) DEVICE — CHLORHEXIDINE 4PCT 4 OZ

## (undated) DEVICE — SUT PDS II 0 CT-1 27 IN Z340H

## (undated) DEVICE — STERILE LUBRICATING JELLY, TUBE: Brand: HR LUBRICATING JELLY

## (undated) DEVICE — TROCAR: Brand: KII FIOS FIRST ENTRY

## (undated) DEVICE — KIT, BETHLEHEM ROBOTIC PROST: Brand: CARDINAL HEALTH

## (undated) DEVICE — JACKSON-PRATT 100CC BULB RESERVOIR: Brand: CARDINAL HEALTH

## (undated) DEVICE — ARM DRAPE

## (undated) DEVICE — HEMOSTATIC MATRIX SURGIFLO 8ML W/THROMBIN

## (undated) DEVICE — VISUALIZATION SYSTEM: Brand: CLEARIFY

## (undated) DEVICE — TIP COVER ACCESSORY

## (undated) DEVICE — SUT VLOC 90 3-0 90 CV-23 L9 IN VLOCM1944

## (undated) DEVICE — ULNAR NERVE PROTECTOR FOAM POSITIONER: Brand: CARDINAL HEALTH

## (undated) DEVICE — SYRINGE 10ML LL

## (undated) DEVICE — BLADELESS OBTURATOR: Brand: WECK VISTA

## (undated) DEVICE — JP CHANNEL DRAIN 19FR, FULL FLUTES: Brand: JACKSON-PRATT

## (undated) DEVICE — COLUMN DRAPE

## (undated) DEVICE — ADHESIVE SKIN CLOSURE SYS EXOFIN FUSION 22CM

## (undated) DEVICE — DRAPE SHEET THREE QUARTER

## (undated) DEVICE — ASTOUND STANDARD SURGICAL GOWN, XL: Brand: CONVERTORS

## (undated) DEVICE — AIRSEAL TUBE SMOKE EVAC LUMENX3 FILTERED

## (undated) DEVICE — MONOPOLAR CURVED SCISSORS: Brand: ENDOWRIST

## (undated) DEVICE — SUT VLOC 90 3-0 CV-23 9 IN VLOCM0844

## (undated) DEVICE — DECANTER: Brand: UNBRANDED

## (undated) DEVICE — INSUFFLATION NEEDLE TO ESTABLISH PNEUMOPERITONEUM.: Brand: INSUFFLATION NEEDLE

## (undated) DEVICE — PROGRASP FORCEPS: Brand: ENDOWRIST

## (undated) DEVICE — SUT MONOCRYL 4-0 PS-2 27 IN Y426H

## (undated) DEVICE — Device

## (undated) DEVICE — 40595 XL TRENDELENBURG POSITIONING KIT: Brand: 40595 XL TRENDELENBURG POSITIONING KIT

## (undated) DEVICE — SUT PROLENE 2-0 KS 30 IN 8623H